# Patient Record
Sex: FEMALE | Race: WHITE | NOT HISPANIC OR LATINO | URBAN - METROPOLITAN AREA
[De-identification: names, ages, dates, MRNs, and addresses within clinical notes are randomized per-mention and may not be internally consistent; named-entity substitution may affect disease eponyms.]

---

## 2017-02-07 ENCOUNTER — OFFICE (AMBULATORY)
Dept: URBAN - METROPOLITAN AREA CLINIC 75 | Facility: CLINIC | Age: 77
End: 2017-02-07

## 2017-02-07 VITALS
HEART RATE: 84 BPM | WEIGHT: 204 LBS | DIASTOLIC BLOOD PRESSURE: 76 MMHG | HEIGHT: 66 IN | SYSTOLIC BLOOD PRESSURE: 148 MMHG | RESPIRATION RATE: 20 BRPM

## 2017-02-07 DIAGNOSIS — R10.13 EPIGASTRIC PAIN: ICD-10-CM

## 2017-02-07 DIAGNOSIS — R07.89 OTHER CHEST PAIN: ICD-10-CM

## 2017-02-07 DIAGNOSIS — K21.9 GASTRO-ESOPHAGEAL REFLUX DISEASE WITHOUT ESOPHAGITIS: ICD-10-CM

## 2017-02-07 DIAGNOSIS — K58.9 IRRITABLE BOWEL SYNDROME WITHOUT DIARRHEA: ICD-10-CM

## 2017-02-07 DIAGNOSIS — R19.4 CHANGE IN BOWEL HABIT: ICD-10-CM

## 2017-02-07 DIAGNOSIS — K31.84 GASTROPARESIS: ICD-10-CM

## 2017-02-07 DIAGNOSIS — K29.70 GASTRITIS, UNSPECIFIED, WITHOUT BLEEDING: ICD-10-CM

## 2017-02-07 DIAGNOSIS — K20.9 ESOPHAGITIS, UNSPECIFIED: ICD-10-CM

## 2017-02-07 PROCEDURE — 99213 OFFICE O/P EST LOW 20 MIN: CPT | Performed by: INTERNAL MEDICINE

## 2017-11-21 ENCOUNTER — OFFICE (AMBULATORY)
Dept: URBAN - METROPOLITAN AREA CLINIC 75 | Facility: CLINIC | Age: 77
End: 2017-11-21

## 2017-11-21 VITALS
WEIGHT: 197 LBS | SYSTOLIC BLOOD PRESSURE: 150 MMHG | HEIGHT: 66 IN | HEART RATE: 76 BPM | DIASTOLIC BLOOD PRESSURE: 82 MMHG

## 2017-11-21 DIAGNOSIS — K75.81 NONALCOHOLIC STEATOHEPATITIS (NASH): ICD-10-CM

## 2017-11-21 DIAGNOSIS — R94.5 ABNORMAL RESULTS OF LIVER FUNCTION STUDIES: ICD-10-CM

## 2017-11-21 DIAGNOSIS — K21.9 GASTRO-ESOPHAGEAL REFLUX DISEASE WITHOUT ESOPHAGITIS: ICD-10-CM

## 2017-11-21 DIAGNOSIS — K31.84 GASTROPARESIS: ICD-10-CM

## 2017-11-21 DIAGNOSIS — K58.9 IRRITABLE BOWEL SYNDROME WITHOUT DIARRHEA: ICD-10-CM

## 2017-11-21 DIAGNOSIS — K29.70 GASTRITIS, UNSPECIFIED, WITHOUT BLEEDING: ICD-10-CM

## 2017-11-21 PROCEDURE — 99214 OFFICE O/P EST MOD 30 MIN: CPT

## 2018-05-17 ENCOUNTER — OFFICE (AMBULATORY)
Dept: URBAN - METROPOLITAN AREA CLINIC 75 | Facility: CLINIC | Age: 78
End: 2018-05-17
Payer: COMMERCIAL

## 2018-05-17 VITALS
WEIGHT: 195 LBS | HEART RATE: 68 BPM | DIASTOLIC BLOOD PRESSURE: 78 MMHG | SYSTOLIC BLOOD PRESSURE: 160 MMHG | TEMPERATURE: 98.2 F | HEIGHT: 66 IN

## 2018-05-17 DIAGNOSIS — K58.9 IRRITABLE BOWEL SYNDROME WITHOUT DIARRHEA: ICD-10-CM

## 2018-05-17 DIAGNOSIS — K74.69 OTHER CIRRHOSIS OF LIVER: ICD-10-CM

## 2018-05-17 DIAGNOSIS — K75.81 NONALCOHOLIC STEATOHEPATITIS (NASH): ICD-10-CM

## 2018-05-17 DIAGNOSIS — K21.9 GASTRO-ESOPHAGEAL REFLUX DISEASE WITHOUT ESOPHAGITIS: ICD-10-CM

## 2018-05-17 DIAGNOSIS — K31.84 GASTROPARESIS: ICD-10-CM

## 2018-05-17 PROCEDURE — 99213 OFFICE O/P EST LOW 20 MIN: CPT

## 2018-05-17 RX ORDER — URSODIOL 500 MG/1
1000 TABLET ORAL
Qty: 180 | Refills: 3 | Status: ACTIVE
Start: 2018-05-17

## 2019-02-26 ENCOUNTER — OFFICE (AMBULATORY)
Dept: URBAN - METROPOLITAN AREA CLINIC 75 | Facility: CLINIC | Age: 79
End: 2019-02-26

## 2019-02-26 VITALS
DIASTOLIC BLOOD PRESSURE: 70 MMHG | HEART RATE: 79 BPM | SYSTOLIC BLOOD PRESSURE: 120 MMHG | WEIGHT: 191 LBS | HEIGHT: 66 IN

## 2019-02-26 DIAGNOSIS — K75.81 NONALCOHOLIC STEATOHEPATITIS (NASH): ICD-10-CM

## 2019-02-26 DIAGNOSIS — K21.9 GASTRO-ESOPHAGEAL REFLUX DISEASE WITHOUT ESOPHAGITIS: ICD-10-CM

## 2019-02-26 DIAGNOSIS — K58.9 IRRITABLE BOWEL SYNDROME WITHOUT DIARRHEA: ICD-10-CM

## 2019-02-26 DIAGNOSIS — K74.60 UNSPECIFIED CIRRHOSIS OF LIVER: ICD-10-CM

## 2019-02-26 PROCEDURE — 99213 OFFICE O/P EST LOW 20 MIN: CPT | Performed by: INTERNAL MEDICINE

## 2019-09-27 ENCOUNTER — OFFICE (AMBULATORY)
Dept: URBAN - METROPOLITAN AREA CLINIC 75 | Facility: CLINIC | Age: 79
End: 2019-09-27

## 2019-09-27 VITALS
OXYGEN SATURATION: 97 % | SYSTOLIC BLOOD PRESSURE: 170 MMHG | HEART RATE: 73 BPM | WEIGHT: 190 LBS | DIASTOLIC BLOOD PRESSURE: 90 MMHG | HEIGHT: 66 IN

## 2019-09-27 DIAGNOSIS — R19.7 DIARRHEA, UNSPECIFIED: ICD-10-CM

## 2019-09-27 DIAGNOSIS — R10.30 LOWER ABDOMINAL PAIN, UNSPECIFIED: ICD-10-CM

## 2019-09-27 DIAGNOSIS — K58.9 IRRITABLE BOWEL SYNDROME WITHOUT DIARRHEA: ICD-10-CM

## 2019-09-27 DIAGNOSIS — K21.9 GASTRO-ESOPHAGEAL REFLUX DISEASE WITHOUT ESOPHAGITIS: ICD-10-CM

## 2019-09-27 PROCEDURE — 99214 OFFICE O/P EST MOD 30 MIN: CPT

## 2019-09-27 RX ORDER — DICYCLOMINE HYDROCHLORIDE 10 MG/1
40 CAPSULE ORAL
Qty: 60 | Refills: 1 | Status: COMPLETED
Start: 2019-09-27 | End: 2019-11-07

## 2019-11-07 ENCOUNTER — OFFICE (AMBULATORY)
Dept: URBAN - METROPOLITAN AREA CLINIC 75 | Facility: CLINIC | Age: 79
End: 2019-11-07

## 2019-11-07 VITALS
HEIGHT: 66 IN | SYSTOLIC BLOOD PRESSURE: 128 MMHG | WEIGHT: 185 LBS | HEART RATE: 74 BPM | OXYGEN SATURATION: 97 % | DIASTOLIC BLOOD PRESSURE: 86 MMHG

## 2019-11-07 DIAGNOSIS — K58.9 IRRITABLE BOWEL SYNDROME WITHOUT DIARRHEA: ICD-10-CM

## 2019-11-07 DIAGNOSIS — A04.72 ENTEROCOLITIS DUE TO CLOSTRIDIUM DIFFICILE, NOT SPECIFIED AS: ICD-10-CM

## 2019-11-07 DIAGNOSIS — R19.7 DIARRHEA, UNSPECIFIED: ICD-10-CM

## 2019-11-07 PROCEDURE — 99214 OFFICE O/P EST MOD 30 MIN: CPT

## 2019-11-07 RX ORDER — VANCOMYCIN HYDROCHLORIDE 50 MG/ML
600 KIT ORAL
Qty: 200 | Refills: 0 | Status: COMPLETED
Start: 2019-11-07 | End: 2020-01-03

## 2019-11-22 ENCOUNTER — OFFICE (AMBULATORY)
Dept: URBAN - METROPOLITAN AREA CLINIC 75 | Facility: CLINIC | Age: 79
End: 2019-11-22

## 2019-11-22 ENCOUNTER — OFFICE (AMBULATORY)
Dept: URBAN - METROPOLITAN AREA LAB 2 | Facility: LAB | Age: 79
End: 2019-11-22

## 2019-11-22 VITALS
WEIGHT: 186 LBS | HEIGHT: 66 IN | DIASTOLIC BLOOD PRESSURE: 92 MMHG | HEART RATE: 77 BPM | SYSTOLIC BLOOD PRESSURE: 138 MMHG | TEMPERATURE: 97.7 F | OXYGEN SATURATION: 95 %

## 2019-11-22 DIAGNOSIS — R53.83 OTHER FATIGUE: ICD-10-CM

## 2019-11-22 DIAGNOSIS — E11.9 TYPE 2 DIABETES MELLITUS WITHOUT COMPLICATIONS: ICD-10-CM

## 2019-11-22 DIAGNOSIS — I10 ESSENTIAL (PRIMARY) HYPERTENSION: ICD-10-CM

## 2019-11-22 DIAGNOSIS — R15.2 FECAL URGENCY: ICD-10-CM

## 2019-11-22 DIAGNOSIS — R19.7 DIARRHEA, UNSPECIFIED: ICD-10-CM

## 2019-11-22 DIAGNOSIS — A04.71 ENTEROCOLITIS DUE TO CLOSTRIDIUM DIFFICILE, RECURRENT: ICD-10-CM

## 2019-11-22 PROCEDURE — 99214 OFFICE O/P EST MOD 30 MIN: CPT

## 2019-11-22 PROCEDURE — 87324 CLOSTRIDIUM AG IA: CPT | Mod: 59 | Performed by: INTERNAL MEDICINE

## 2019-11-22 PROCEDURE — 87449 NOS EACH ORGANISM AG IA: CPT | Performed by: INTERNAL MEDICINE

## 2019-11-22 RX ORDER — FIDAXOMICIN 200 MG/1
400 TABLET, FILM COATED ORAL
Qty: 20 | Refills: 0 | Status: COMPLETED
Start: 2019-11-22 | End: 2020-01-03

## 2020-01-03 ENCOUNTER — OFFICE (AMBULATORY)
Dept: URBAN - METROPOLITAN AREA CLINIC 75 | Facility: CLINIC | Age: 80
End: 2020-01-03

## 2020-01-03 ENCOUNTER — OFFICE (AMBULATORY)
Dept: URBAN - METROPOLITAN AREA LAB 2 | Facility: LAB | Age: 80
End: 2020-01-03

## 2020-01-03 VITALS
SYSTOLIC BLOOD PRESSURE: 170 MMHG | HEART RATE: 85 BPM | DIASTOLIC BLOOD PRESSURE: 118 MMHG | HEIGHT: 66 IN | WEIGHT: 187 LBS | OXYGEN SATURATION: 94 %

## 2020-01-03 DIAGNOSIS — R19.7 DIARRHEA, UNSPECIFIED: ICD-10-CM

## 2020-01-03 DIAGNOSIS — A04.72 ENTEROCOLITIS DUE TO CLOSTRIDIUM DIFFICILE, NOT SPECIFIED AS: ICD-10-CM

## 2020-01-03 PROCEDURE — 87324 CLOSTRIDIUM AG IA: CPT | Mod: 59

## 2020-01-03 PROCEDURE — 87449 NOS EACH ORGANISM AG IA: CPT

## 2020-01-03 PROCEDURE — 99214 OFFICE O/P EST MOD 30 MIN: CPT

## 2020-01-16 VITALS
DIASTOLIC BLOOD PRESSURE: 90 MMHG | SYSTOLIC BLOOD PRESSURE: 154 MMHG | DIASTOLIC BLOOD PRESSURE: 72 MMHG | SYSTOLIC BLOOD PRESSURE: 199 MMHG | SYSTOLIC BLOOD PRESSURE: 155 MMHG | DIASTOLIC BLOOD PRESSURE: 73 MMHG | HEART RATE: 95 BPM | RESPIRATION RATE: 16 BRPM | HEART RATE: 74 BPM | SYSTOLIC BLOOD PRESSURE: 178 MMHG | DIASTOLIC BLOOD PRESSURE: 69 MMHG | RESPIRATION RATE: 18 BRPM | RESPIRATION RATE: 13 BRPM | HEART RATE: 83 BPM | DIASTOLIC BLOOD PRESSURE: 107 MMHG | WEIGHT: 187 LBS | SYSTOLIC BLOOD PRESSURE: 174 MMHG | HEIGHT: 66 IN | HEART RATE: 89 BPM | SYSTOLIC BLOOD PRESSURE: 146 MMHG | HEART RATE: 84 BPM | DIASTOLIC BLOOD PRESSURE: 58 MMHG | RESPIRATION RATE: 21 BRPM | DIASTOLIC BLOOD PRESSURE: 84 MMHG | OXYGEN SATURATION: 97 % | HEART RATE: 88 BPM | SYSTOLIC BLOOD PRESSURE: 138 MMHG | SYSTOLIC BLOOD PRESSURE: 183 MMHG | HEART RATE: 99 BPM | OXYGEN SATURATION: 95 % | RESPIRATION RATE: 22 BRPM | SYSTOLIC BLOOD PRESSURE: 143 MMHG | OXYGEN SATURATION: 98 % | TEMPERATURE: 98.8 F | RESPIRATION RATE: 23 BRPM | DIASTOLIC BLOOD PRESSURE: 76 MMHG | DIASTOLIC BLOOD PRESSURE: 74 MMHG | TEMPERATURE: 97.5 F

## 2020-01-17 ENCOUNTER — AMBULATORY SURGICAL CENTER (AMBULATORY)
Dept: URBAN - METROPOLITAN AREA SURGERY 17 | Facility: SURGERY | Age: 80
End: 2020-01-17
Payer: COMMERCIAL

## 2020-01-17 ENCOUNTER — OFFICE (AMBULATORY)
Dept: URBAN - METROPOLITAN AREA PATHOLOGY 4 | Facility: PATHOLOGY | Age: 80
End: 2020-01-17

## 2020-01-17 DIAGNOSIS — R19.4 CHANGE IN BOWEL HABIT: ICD-10-CM

## 2020-01-17 DIAGNOSIS — R19.7 DIARRHEA, UNSPECIFIED: ICD-10-CM

## 2020-01-17 DIAGNOSIS — Z86.19 PERSONAL HISTORY OF OTHER INFECTIOUS AND PARASITIC DISEASES: ICD-10-CM

## 2020-01-17 LAB
GI HISTOLOGY: A. UNSPECIFIED: (no result)
GI HISTOLOGY: B. SELECT: (no result)
GI HISTOLOGY: PDF REPORT: (no result)

## 2020-01-17 PROCEDURE — 88305 TISSUE EXAM BY PATHOLOGIST: CPT | Performed by: INTERNAL MEDICINE

## 2020-01-17 PROCEDURE — 45380 COLONOSCOPY AND BIOPSY: CPT | Performed by: INTERNAL MEDICINE

## 2020-02-28 ENCOUNTER — OFFICE (AMBULATORY)
Dept: URBAN - METROPOLITAN AREA CLINIC 75 | Facility: CLINIC | Age: 80
End: 2020-02-28

## 2020-02-28 VITALS
WEIGHT: 189 LBS | HEIGHT: 66 IN | HEART RATE: 69 BPM | OXYGEN SATURATION: 96 % | DIASTOLIC BLOOD PRESSURE: 86 MMHG | SYSTOLIC BLOOD PRESSURE: 128 MMHG

## 2020-02-28 DIAGNOSIS — K58.9 IRRITABLE BOWEL SYNDROME WITHOUT DIARRHEA: ICD-10-CM

## 2020-02-28 DIAGNOSIS — K21.9 GASTRO-ESOPHAGEAL REFLUX DISEASE WITHOUT ESOPHAGITIS: ICD-10-CM

## 2020-02-28 DIAGNOSIS — K31.84 GASTROPARESIS: ICD-10-CM

## 2020-02-28 DIAGNOSIS — K75.81 NONALCOHOLIC STEATOHEPATITIS (NASH): ICD-10-CM

## 2020-02-28 PROCEDURE — 99214 OFFICE O/P EST MOD 30 MIN: CPT

## 2020-02-28 RX ORDER — DIPHENOXYLATE HYDROCHLORIDE AND ATROPINE SULFATE 2.5; .025 MG/1; MG/1
10 TABLET ORAL
Qty: 120 | Refills: 5 | Status: ACTIVE

## 2021-02-19 ENCOUNTER — OFFICE (AMBULATORY)
Dept: URBAN - METROPOLITAN AREA CLINIC 75 | Facility: CLINIC | Age: 81
End: 2021-02-19

## 2021-02-19 VITALS — OXYGEN SATURATION: 97 % | HEIGHT: 66 IN | WEIGHT: 188 LBS | TEMPERATURE: 97.3 F | HEART RATE: 76 BPM

## 2021-02-19 DIAGNOSIS — K31.84 GASTROPARESIS: ICD-10-CM

## 2021-02-19 DIAGNOSIS — Z91.89 OTHER SPECIFIED PERSONAL RISK FACTORS, NOT ELSEWHERE CLASSIF: ICD-10-CM

## 2021-02-19 DIAGNOSIS — K75.81 NONALCOHOLIC STEATOHEPATITIS (NASH): ICD-10-CM

## 2021-02-19 DIAGNOSIS — K21.9 GASTRO-ESOPHAGEAL REFLUX DISEASE WITHOUT ESOPHAGITIS: ICD-10-CM

## 2021-02-19 DIAGNOSIS — K58.9 IRRITABLE BOWEL SYNDROME WITHOUT DIARRHEA: ICD-10-CM

## 2021-02-19 PROCEDURE — 99214 OFFICE O/P EST MOD 30 MIN: CPT

## 2021-02-19 RX ORDER — AZITHROMYCIN 250 MG/1
TABLET, FILM COATED ORAL
Qty: 1 | Refills: 1 | Status: COMPLETED
Start: 2021-02-19 | End: 2021-04-09

## 2021-04-09 ENCOUNTER — OFFICE (AMBULATORY)
Dept: URBAN - METROPOLITAN AREA CLINIC 75 | Facility: CLINIC | Age: 81
End: 2021-04-09

## 2021-04-09 VITALS
TEMPERATURE: 96.8 F | WEIGHT: 184 LBS | DIASTOLIC BLOOD PRESSURE: 54 MMHG | SYSTOLIC BLOOD PRESSURE: 116 MMHG | HEIGHT: 66 IN

## 2021-04-09 DIAGNOSIS — Z91.89 OTHER SPECIFIED PERSONAL RISK FACTORS, NOT ELSEWHERE CLASSIF: ICD-10-CM

## 2021-04-09 DIAGNOSIS — R10.13 EPIGASTRIC PAIN: ICD-10-CM

## 2021-04-09 DIAGNOSIS — K21.9 GASTRO-ESOPHAGEAL REFLUX DISEASE WITHOUT ESOPHAGITIS: ICD-10-CM

## 2021-04-09 DIAGNOSIS — K31.84 GASTROPARESIS: ICD-10-CM

## 2021-04-09 DIAGNOSIS — R11.0 NAUSEA: ICD-10-CM

## 2021-04-09 PROCEDURE — 99214 OFFICE O/P EST MOD 30 MIN: CPT

## 2021-06-16 VITALS
SYSTOLIC BLOOD PRESSURE: 140 MMHG | TEMPERATURE: 97.9 F | HEART RATE: 75 BPM | RESPIRATION RATE: 18 BRPM | WEIGHT: 184 LBS | RESPIRATION RATE: 22 BRPM | SYSTOLIC BLOOD PRESSURE: 175 MMHG | SYSTOLIC BLOOD PRESSURE: 156 MMHG | HEART RATE: 76 BPM | DIASTOLIC BLOOD PRESSURE: 78 MMHG | HEIGHT: 66 IN | OXYGEN SATURATION: 95 % | DIASTOLIC BLOOD PRESSURE: 71 MMHG | OXYGEN SATURATION: 99 % | OXYGEN SATURATION: 92 % | HEART RATE: 84 BPM | HEART RATE: 80 BPM | DIASTOLIC BLOOD PRESSURE: 81 MMHG | RESPIRATION RATE: 17 BRPM | OXYGEN SATURATION: 84 % | SYSTOLIC BLOOD PRESSURE: 146 MMHG | TEMPERATURE: 97.8 F | DIASTOLIC BLOOD PRESSURE: 74 MMHG | OXYGEN SATURATION: 96 % | RESPIRATION RATE: 20 BRPM | SYSTOLIC BLOOD PRESSURE: 134 MMHG

## 2021-06-21 ENCOUNTER — AMBULATORY SURGICAL CENTER (AMBULATORY)
Dept: URBAN - METROPOLITAN AREA SURGERY 17 | Facility: SURGERY | Age: 81
End: 2021-06-21
Payer: COMMERCIAL

## 2021-06-21 ENCOUNTER — OFFICE (AMBULATORY)
Dept: URBAN - METROPOLITAN AREA PATHOLOGY 4 | Facility: PATHOLOGY | Age: 81
End: 2021-06-21
Payer: COMMERCIAL

## 2021-06-21 DIAGNOSIS — K29.50 UNSPECIFIED CHRONIC GASTRITIS WITHOUT BLEEDING: ICD-10-CM

## 2021-06-21 DIAGNOSIS — R10.13 EPIGASTRIC PAIN: ICD-10-CM

## 2021-06-21 DIAGNOSIS — B96.81 HELICOBACTER PYLORI [H. PYLORI] AS THE CAUSE OF DISEASES CLA: ICD-10-CM

## 2021-06-21 DIAGNOSIS — K31.89 OTHER DISEASES OF STOMACH AND DUODENUM: ICD-10-CM

## 2021-06-21 DIAGNOSIS — R11.0 NAUSEA: ICD-10-CM

## 2021-06-21 DIAGNOSIS — K29.60 OTHER GASTRITIS WITHOUT BLEEDING: ICD-10-CM

## 2021-06-21 DIAGNOSIS — K21.9 GASTRO-ESOPHAGEAL REFLUX DISEASE WITHOUT ESOPHAGITIS: ICD-10-CM

## 2021-06-21 LAB
GI HISTOLOGY: A. SELECT: (no result)
GI HISTOLOGY: B. UNSPECIFIED: (no result)
GI HISTOLOGY: C. SELECT: (no result)
GI HISTOLOGY: PDF REPORT: (no result)

## 2021-06-21 PROCEDURE — 88305 TISSUE EXAM BY PATHOLOGIST: CPT | Mod: Q6 | Performed by: INTERNAL MEDICINE

## 2021-06-21 PROCEDURE — 43239 EGD BIOPSY SINGLE/MULTIPLE: CPT | Performed by: INTERNAL MEDICINE

## 2021-06-21 PROCEDURE — 88342 IMHCHEM/IMCYTCHM 1ST ANTB: CPT | Mod: Q6 | Performed by: INTERNAL MEDICINE

## 2021-06-21 NOTE — SERVICEHPINOTES
04/09/21...Mrs. Holm follows up today to reassess c/p sour stomach and burning in stomach. She was given a trial of azithromycin which did not help so she restarted the domperidone. She was instructed to increased her Omeprazole 40mg to BID dosing. She reports that this seem to help. She still reports poor appetite in the evening and weight loss (4lbs in 6 weeks). She reports sour stomach if she lies down after eating . Not as bad as it was but still present. BRHad EKG done with PCP at [facility]--normal per pateint--will obtain   NANCY HOLM  is a  80  female   who presents today for a  EGD   for   the indications listed below. The updated Patient Profile was reviewed prior to the procedure, in conjunction with the Physical Exam, including medical conditions, surgical procedures, medications, allergies, family history and social history. See Physical Exam time stamp below for date and time of HPI completion.Pre-operatively, I reviewed the indication(s) for the procedure, the risks of the procedure [including but not limited to: unexpected bleeding possibly requiring hospitalization and/or unplanned repeat procedures, perforation possibly requiring surgical treatment, missed lesions and complications of sedation/MAC (also explained by anesthesia staff)]. I have evaluated the patient for risks associated with the planned anesthesia and the procedure to be performed and find the patient an acceptable candidate for IV sedation.Multiple opportunities were provided for any questions or concerns, and all questions were answered satisfactorily before any anesthesia was administered. We will proceed with the planned procedure.QUINCY

## 2021-06-25 ENCOUNTER — APPOINTMENT (OUTPATIENT)
Dept: CT IMAGING | Facility: HOSPITAL | Age: 81
End: 2021-06-25

## 2021-06-25 ENCOUNTER — APPOINTMENT (OUTPATIENT)
Dept: GENERAL RADIOLOGY | Facility: HOSPITAL | Age: 81
End: 2021-06-25

## 2021-06-25 ENCOUNTER — HOSPITAL ENCOUNTER (EMERGENCY)
Facility: HOSPITAL | Age: 81
Discharge: HOME | End: 2021-06-25
Admitting: EMERGENCY MEDICINE

## 2021-06-25 VITALS
WEIGHT: 186.95 LBS | SYSTOLIC BLOOD PRESSURE: 152 MMHG | HEIGHT: 66 IN | OXYGEN SATURATION: 96 % | BODY MASS INDEX: 30.05 KG/M2 | HEART RATE: 61 BPM | RESPIRATION RATE: 16 BRPM | DIASTOLIC BLOOD PRESSURE: 86 MMHG | TEMPERATURE: 98.6 F

## 2021-06-25 DIAGNOSIS — S20.219A CONTUSION OF CHEST WALL WITH INTACT SKIN: ICD-10-CM

## 2021-06-25 DIAGNOSIS — M54.2 CERVICALGIA: ICD-10-CM
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
## 2021-06-25 DIAGNOSIS — M54.9 ACUTE MIDLINE BACK PAIN, UNSPECIFIED BACK LOCATION: ICD-10-CM

## 2021-06-25 DIAGNOSIS — M89.8X8 BONY PELVIC PAIN: ICD-10-CM

## 2021-06-25 DIAGNOSIS — M89.8X5 PAIN IN FEMUR: ICD-10-CM

## 2021-06-25 LAB — CREAT BLDA-MCNC: 0.9 MG/DL (ref 0.6–1.3)

## 2021-06-25 PROCEDURE — 74177 CT ABD & PELVIS W/CONTRAST: CPT

## 2021-06-25 PROCEDURE — 72125 CT NECK SPINE W/O DYE: CPT

## 2021-06-25 PROCEDURE — 73552 X-RAY EXAM OF FEMUR 2/>: CPT

## 2021-06-25 PROCEDURE — 0 IOPAMIDOL PER 1 ML: Performed by: NURSE PRACTITIONER

## 2021-06-25 PROCEDURE — 71260 CT THORAX DX C+: CPT

## 2021-06-25 PROCEDURE — 82565 ASSAY OF CREATININE: CPT

## 2021-06-25 PROCEDURE — 99283 EMERGENCY DEPT VISIT LOW MDM: CPT

## 2021-06-25 RX ADMIN — IOPAMIDOL 100 ML: 755 INJECTION, SOLUTION INTRAVENOUS at 18:20

## 2021-12-18 ENCOUNTER — APPOINTMENT (OUTPATIENT)
Dept: GENERAL RADIOLOGY | Facility: HOSPITAL | Age: 81
End: 2021-12-18

## 2021-12-18 ENCOUNTER — HOSPITAL ENCOUNTER (EMERGENCY)
Facility: HOSPITAL | Age: 81
Discharge: HOME | End: 2021-12-18
Admitting: EMERGENCY MEDICINE

## 2021-12-18 ENCOUNTER — APPOINTMENT (OUTPATIENT)
Dept: CT IMAGING | Facility: HOSPITAL | Age: 81
End: 2021-12-18

## 2021-12-18 VITALS
WEIGHT: 186 LBS | RESPIRATION RATE: 16 BRPM | SYSTOLIC BLOOD PRESSURE: 160 MMHG | BODY MASS INDEX: 25.19 KG/M2 | TEMPERATURE: 98.3 F | HEART RATE: 73 BPM | DIASTOLIC BLOOD PRESSURE: 71 MMHG | HEIGHT: 72 IN | OXYGEN SATURATION: 95 %
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
## 2021-12-18 DIAGNOSIS — S20.212A CONTUSION OF LEFT CHEST WALL, INITIAL ENCOUNTER: ICD-10-CM

## 2021-12-18 DIAGNOSIS — R07.81 RIB PAIN ON LEFT SIDE: Primary | ICD-10-CM

## 2021-12-18 DIAGNOSIS — S09.90XA INJURY OF HEAD, INITIAL ENCOUNTER: ICD-10-CM

## 2021-12-18 PROCEDURE — 71046 X-RAY EXAM CHEST 2 VIEWS: CPT

## 2021-12-18 PROCEDURE — 99283 EMERGENCY DEPT VISIT LOW MDM: CPT

## 2021-12-18 PROCEDURE — 72125 CT NECK SPINE W/O DYE: CPT

## 2021-12-18 PROCEDURE — 73502 X-RAY EXAM HIP UNI 2-3 VIEWS: CPT

## 2021-12-18 PROCEDURE — 70450 CT HEAD/BRAIN W/O DYE: CPT

## 2021-12-18 RX ORDER — ACETAMINOPHEN 325 MG/1
650 TABLET ORAL EVERY 6 HOURS PRN
Status: DISCONTINUED | OUTPATIENT
Start: 2021-12-18 | End: 2021-12-18 | Stop reason: HOSPADM

## 2021-12-18 RX ADMIN — ACETAMINOPHEN 650 MG: 325 TABLET, FILM COATED ORAL at 13:03

## 2022-05-04 ENCOUNTER — APPOINTMENT (OUTPATIENT)
Dept: CT IMAGING | Facility: HOSPITAL | Age: 82
End: 2022-05-04

## 2022-05-04 ENCOUNTER — HOSPITAL ENCOUNTER (INPATIENT)
Facility: HOSPITAL | Age: 82
LOS: 7 days | Discharge: NURSING, REHAB OR LONG-TERM CARE | End: 2022-05-11
Attending: EMERGENCY MEDICINE | Admitting: INTERNAL MEDICINE

## 2022-05-04 ENCOUNTER — APPOINTMENT (OUTPATIENT)
Dept: GENERAL RADIOLOGY | Facility: HOSPITAL | Age: 82
End: 2022-05-04

## 2022-05-04 DIAGNOSIS — E87.6 HYPOKALEMIA: Primary | ICD-10-CM

## 2022-05-04 DIAGNOSIS — N39.0 ACUTE UTI: ICD-10-CM

## 2022-05-04 DIAGNOSIS — R53.1 WEAKNESS: ICD-10-CM

## 2022-05-04 PROBLEM — G93.41 ACUTE METABOLIC ENCEPHALOPATHY: Status: ACTIVE | Noted: 2022-05-04

## 2022-05-04 PROBLEM — E86.0 DEHYDRATION: Status: ACTIVE | Noted: 2022-05-04

## 2022-05-04 LAB
ALBUMIN SERPL-MCNC: 3.3 G/DL (ref 3.5–5.2)
ALBUMIN/GLOB SERPL: 1.3 G/DL
ALP SERPL-CCNC: 141 U/L (ref 39–117)
ALT SERPL W P-5'-P-CCNC: 10 U/L (ref 1–33)
ANION GAP SERPL CALCULATED.3IONS-SCNC: 11 MMOL/L (ref 5–15)
ANION GAP SERPL CALCULATED.3IONS-SCNC: 12 MMOL/L (ref 5–15)
AST SERPL-CCNC: 35 U/L (ref 1–32)
B PARAPERT DNA SPEC QL NAA+PROBE: NOT DETECTED
B PERT DNA SPEC QL NAA+PROBE: NOT DETECTED
BACTERIA UR QL AUTO: ABNORMAL /HPF
BASOPHILS # BLD AUTO: 0 10*3/MM3 (ref 0–0.2)
BASOPHILS # BLD AUTO: 0 10*3/MM3 (ref 0–0.2)
BASOPHILS NFR BLD AUTO: 0.3 % (ref 0–1.5)
BASOPHILS NFR BLD AUTO: 0.3 % (ref 0–1.5)
BILIRUB SERPL-MCNC: 0.4 MG/DL (ref 0–1.2)
BILIRUB UR QL STRIP: NEGATIVE
BUN SERPL-MCNC: 11 MG/DL (ref 8–23)
BUN SERPL-MCNC: 13 MG/DL (ref 8–23)
BUN/CREAT SERPL: 16.7 (ref 7–25)
BUN/CREAT SERPL: 17.6 (ref 7–25)
C PNEUM DNA NPH QL NAA+NON-PROBE: NOT DETECTED
CALCIUM SPEC-SCNC: 7.6 MG/DL (ref 8.6–10.5)
CALCIUM SPEC-SCNC: 8 MG/DL (ref 8.6–10.5)
CHLORIDE SERPL-SCNC: 91 MMOL/L (ref 98–107)
CHLORIDE SERPL-SCNC: 97 MMOL/L (ref 98–107)
CLARITY UR: ABNORMAL
CO2 SERPL-SCNC: 28 MMOL/L (ref 22–29)
CO2 SERPL-SCNC: 29 MMOL/L (ref 22–29)
COLOR UR: YELLOW
CREAT SERPL-MCNC: 0.66 MG/DL (ref 0.57–1)
CREAT SERPL-MCNC: 0.74 MG/DL (ref 0.57–1)
DEPRECATED RDW RBC AUTO: 42.9 FL (ref 37–54)
DEPRECATED RDW RBC AUTO: 44.2 FL (ref 37–54)
EGFRCR SERPLBLD CKD-EPI 2021: 80.9 ML/MIN/1.73
EGFRCR SERPLBLD CKD-EPI 2021: 87.7 ML/MIN/1.73
EOSINOPHIL # BLD AUTO: 0.1 10*3/MM3 (ref 0–0.4)
EOSINOPHIL # BLD AUTO: 0.2 10*3/MM3 (ref 0–0.4)
EOSINOPHIL NFR BLD AUTO: 1.2 % (ref 0.3–6.2)
EOSINOPHIL NFR BLD AUTO: 2.1 % (ref 0.3–6.2)
ERYTHROCYTE [DISTWIDTH] IN BLOOD BY AUTOMATED COUNT: 14.5 % (ref 12.3–15.4)
ERYTHROCYTE [DISTWIDTH] IN BLOOD BY AUTOMATED COUNT: 14.9 % (ref 12.3–15.4)
FLUAV SUBTYP SPEC NAA+PROBE: NOT DETECTED
FLUBV RNA ISLT QL NAA+PROBE: NOT DETECTED
GLOBULIN UR ELPH-MCNC: 2.6 GM/DL
GLUCOSE SERPL-MCNC: 136 MG/DL (ref 65–99)
GLUCOSE SERPL-MCNC: 181 MG/DL (ref 65–99)
GLUCOSE UR STRIP-MCNC: NEGATIVE MG/DL
HADV DNA SPEC NAA+PROBE: NOT DETECTED
HCOV 229E RNA SPEC QL NAA+PROBE: NOT DETECTED
HCOV HKU1 RNA SPEC QL NAA+PROBE: NOT DETECTED
HCOV NL63 RNA SPEC QL NAA+PROBE: NOT DETECTED
HCOV OC43 RNA SPEC QL NAA+PROBE: NOT DETECTED
HCT VFR BLD AUTO: 33.3 % (ref 34–46.6)
HCT VFR BLD AUTO: 35.2 % (ref 34–46.6)
HGB BLD-MCNC: 11 G/DL (ref 12–15.9)
HGB BLD-MCNC: 11.6 G/DL (ref 12–15.9)
HGB UR QL STRIP.AUTO: NEGATIVE
HMPV RNA NPH QL NAA+NON-PROBE: NOT DETECTED
HPIV1 RNA ISLT QL NAA+PROBE: NOT DETECTED
HPIV2 RNA SPEC QL NAA+PROBE: NOT DETECTED
HPIV3 RNA NPH QL NAA+PROBE: NOT DETECTED
HPIV4 P GENE NPH QL NAA+PROBE: NOT DETECTED
HYALINE CASTS UR QL AUTO: ABNORMAL /LPF
KETONES UR QL STRIP: NEGATIVE
LEUKOCYTE ESTERASE UR QL STRIP.AUTO: ABNORMAL
LYMPHOCYTES # BLD AUTO: 1.6 10*3/MM3 (ref 0.7–3.1)
LYMPHOCYTES # BLD AUTO: 2.3 10*3/MM3 (ref 0.7–3.1)
LYMPHOCYTES NFR BLD AUTO: 15.6 % (ref 19.6–45.3)
LYMPHOCYTES NFR BLD AUTO: 22.2 % (ref 19.6–45.3)
M PNEUMO IGG SER IA-ACNC: NOT DETECTED
MAGNESIUM SERPL-MCNC: 1.4 MG/DL (ref 1.6–2.4)
MCH RBC QN AUTO: 27.9 PG (ref 26.6–33)
MCH RBC QN AUTO: 28 PG (ref 26.6–33)
MCHC RBC AUTO-ENTMCNC: 32.9 G/DL (ref 31.5–35.7)
MCHC RBC AUTO-ENTMCNC: 33.2 G/DL (ref 31.5–35.7)
MCV RBC AUTO: 84.5 FL (ref 79–97)
MCV RBC AUTO: 84.6 FL (ref 79–97)
MONOCYTES # BLD AUTO: 0.8 10*3/MM3 (ref 0.1–0.9)
MONOCYTES # BLD AUTO: 0.9 10*3/MM3 (ref 0.1–0.9)
MONOCYTES NFR BLD AUTO: 7.9 % (ref 5–12)
MONOCYTES NFR BLD AUTO: 8.5 % (ref 5–12)
NEUTROPHILS NFR BLD AUTO: 6.9 10*3/MM3 (ref 1.7–7)
NEUTROPHILS NFR BLD AUTO: 67.8 % (ref 42.7–76)
NEUTROPHILS NFR BLD AUTO: 7.4 10*3/MM3 (ref 1.7–7)
NEUTROPHILS NFR BLD AUTO: 74.1 % (ref 42.7–76)
NITRITE UR QL STRIP: POSITIVE
NRBC BLD AUTO-RTO: 0 /100 WBC (ref 0–0.2)
NRBC BLD AUTO-RTO: 0.1 /100 WBC (ref 0–0.2)
PH UR STRIP.AUTO: 6.5 [PH] (ref 5–8)
PLATELET # BLD AUTO: 213 10*3/MM3 (ref 140–450)
PLATELET # BLD AUTO: 217 10*3/MM3 (ref 140–450)
PMV BLD AUTO: 7.5 FL (ref 6–12)
PMV BLD AUTO: 8 FL (ref 6–12)
POTASSIUM SERPL-SCNC: 2.6 MMOL/L (ref 3.5–5.2)
POTASSIUM SERPL-SCNC: 2.8 MMOL/L (ref 3.5–5.2)
PROT SERPL-MCNC: 5.9 G/DL (ref 6–8.5)
PROT UR QL STRIP: NEGATIVE
RBC # BLD AUTO: 3.93 10*6/MM3 (ref 3.77–5.28)
RBC # BLD AUTO: 4.16 10*6/MM3 (ref 3.77–5.28)
RBC # UR STRIP: ABNORMAL /HPF
REF LAB TEST METHOD: ABNORMAL
RHINOVIRUS RNA SPEC NAA+PROBE: NOT DETECTED
RSV RNA NPH QL NAA+NON-PROBE: NOT DETECTED
SARS-COV-2 RNA NPH QL NAA+NON-PROBE: NOT DETECTED
SODIUM SERPL-SCNC: 132 MMOL/L (ref 136–145)
SODIUM SERPL-SCNC: 136 MMOL/L (ref 136–145)
SP GR UR STRIP: 1.01 (ref 1–1.03)
SQUAMOUS #/AREA URNS HPF: ABNORMAL /HPF
UROBILINOGEN UR QL STRIP: ABNORMAL
WBC # UR STRIP: ABNORMAL /HPF
WBC NRBC COR # BLD: 10 10*3/MM3 (ref 3.4–10.8)
WBC NRBC COR # BLD: 10.2 10*3/MM3 (ref 3.4–10.8)

## 2022-05-04 PROCEDURE — 87102 FUNGUS ISOLATION CULTURE: CPT | Performed by: HOSPITALIST

## 2022-05-04 PROCEDURE — 0 IOPAMIDOL PER 1 ML: Performed by: EMERGENCY MEDICINE

## 2022-05-04 PROCEDURE — 99223 1ST HOSP IP/OBS HIGH 75: CPT | Performed by: HOSPITALIST

## 2022-05-04 PROCEDURE — 83735 ASSAY OF MAGNESIUM: CPT

## 2022-05-04 PROCEDURE — 85025 COMPLETE CBC W/AUTO DIFF WBC: CPT

## 2022-05-04 PROCEDURE — 99285 EMERGENCY DEPT VISIT HI MDM: CPT

## 2022-05-04 PROCEDURE — 25010000002 CEFTRIAXONE PER 250 MG

## 2022-05-04 PROCEDURE — 93005 ELECTROCARDIOGRAM TRACING: CPT

## 2022-05-04 PROCEDURE — 85025 COMPLETE CBC W/AUTO DIFF WBC: CPT | Performed by: HOSPITALIST

## 2022-05-04 PROCEDURE — 81001 URINALYSIS AUTO W/SCOPE: CPT

## 2022-05-04 PROCEDURE — 71045 X-RAY EXAM CHEST 1 VIEW: CPT

## 2022-05-04 PROCEDURE — 25010000002 ONDANSETRON PER 1 MG

## 2022-05-04 PROCEDURE — 80053 COMPREHEN METABOLIC PANEL: CPT

## 2022-05-04 PROCEDURE — 25010000002 SODIUM CHLORIDE 0.9 % WITH KCL 20 MEQ 20-0.9 MEQ/L-% SOLUTION: Performed by: HOSPITALIST

## 2022-05-04 PROCEDURE — 0202U NFCT DS 22 TRGT SARS-COV-2: CPT

## 2022-05-04 PROCEDURE — 74177 CT ABD & PELVIS W/CONTRAST: CPT

## 2022-05-04 PROCEDURE — P9612 CATHETERIZE FOR URINE SPEC: HCPCS

## 2022-05-04 PROCEDURE — 25010000002 ENOXAPARIN PER 10 MG: Performed by: HOSPITALIST

## 2022-05-04 RX ORDER — METAXALONE 800 MG/1
400 TABLET ORAL 2 TIMES DAILY PRN
COMMUNITY
End: 2022-11-13

## 2022-05-04 RX ORDER — BUPROPION HYDROCHLORIDE 150 MG/1
150 TABLET ORAL DAILY
COMMUNITY

## 2022-05-04 RX ORDER — OXYCODONE AND ACETAMINOPHEN 7.5; 325 MG/1; MG/1
1 TABLET ORAL EVERY 6 HOURS PRN
COMMUNITY
End: 2022-11-13

## 2022-05-04 RX ORDER — SODIUM CHLORIDE AND POTASSIUM CHLORIDE 150; 900 MG/100ML; MG/100ML
100 INJECTION, SOLUTION INTRAVENOUS CONTINUOUS
Status: DISCONTINUED | OUTPATIENT
Start: 2022-05-04 | End: 2022-05-05

## 2022-05-04 RX ORDER — VENLAFAXINE HYDROCHLORIDE 150 MG/1
150 CAPSULE, EXTENDED RELEASE ORAL DAILY
COMMUNITY

## 2022-05-04 RX ORDER — HYDROCHLOROTHIAZIDE 25 MG/1
25 TABLET ORAL DAILY
COMMUNITY

## 2022-05-04 RX ORDER — LEVOTHYROXINE SODIUM 0.03 MG/1
25 TABLET ORAL
COMMUNITY

## 2022-05-04 RX ORDER — ALBUTEROL SULFATE 90 UG/1
2 AEROSOL, METERED RESPIRATORY (INHALATION) EVERY 4 HOURS PRN
COMMUNITY

## 2022-05-04 RX ORDER — ENOXAPARIN SODIUM 100 MG/ML
40 INJECTION SUBCUTANEOUS
Status: DISCONTINUED | OUTPATIENT
Start: 2022-05-04 | End: 2022-05-11 | Stop reason: HOSPADM

## 2022-05-04 RX ORDER — POTASSIUM CHLORIDE 1.5 G/1.77G
40 POWDER, FOR SOLUTION ORAL AS NEEDED
Status: DISCONTINUED | OUTPATIENT
Start: 2022-05-04 | End: 2022-05-11 | Stop reason: HOSPADM

## 2022-05-04 RX ORDER — SODIUM CHLORIDE 9 MG/ML
100 INJECTION, SOLUTION INTRAVENOUS CONTINUOUS
Status: DISCONTINUED | OUTPATIENT
Start: 2022-05-04 | End: 2022-05-05

## 2022-05-04 RX ORDER — OMEPRAZOLE 40 MG/1
40 CAPSULE, DELAYED RELEASE ORAL 2 TIMES DAILY
COMMUNITY

## 2022-05-04 RX ORDER — BUDESONIDE AND FORMOTEROL FUMARATE DIHYDRATE 80; 4.5 UG/1; UG/1
2 AEROSOL RESPIRATORY (INHALATION)
COMMUNITY
End: 2022-11-13

## 2022-05-04 RX ORDER — BENAZEPRIL HYDROCHLORIDE 40 MG/1
40 TABLET, FILM COATED ORAL DAILY
COMMUNITY

## 2022-05-04 RX ORDER — URSODIOL 500 MG/1
500 TABLET, FILM COATED ORAL 2 TIMES DAILY
COMMUNITY

## 2022-05-04 RX ORDER — CLONAZEPAM 0.5 MG/1
0.5 TABLET ORAL NIGHTLY
COMMUNITY

## 2022-05-04 RX ORDER — POTASSIUM CHLORIDE 20 MEQ/1
40 TABLET, EXTENDED RELEASE ORAL ONCE
Status: COMPLETED | OUTPATIENT
Start: 2022-05-04 | End: 2022-05-04

## 2022-05-04 RX ORDER — ESTRADIOL 0.5 MG/1
0.5 TABLET ORAL DAILY
COMMUNITY

## 2022-05-04 RX ORDER — ICOSAPENT ETHYL 1000 MG/1
1 CAPSULE ORAL 2 TIMES DAILY WITH MEALS
COMMUNITY

## 2022-05-04 RX ORDER — POTASSIUM CHLORIDE 20 MEQ/1
40 TABLET, EXTENDED RELEASE ORAL AS NEEDED
Status: DISCONTINUED | OUTPATIENT
Start: 2022-05-04 | End: 2022-05-11 | Stop reason: HOSPADM

## 2022-05-04 RX ORDER — ENOXAPARIN SODIUM 100 MG/ML
40 INJECTION SUBCUTANEOUS EVERY 24 HOURS
Status: DISCONTINUED | OUTPATIENT
Start: 2022-05-04 | End: 2022-05-04 | Stop reason: SDUPTHER

## 2022-05-04 RX ORDER — SUCRALFATE 1 G/1
1 TABLET ORAL
COMMUNITY

## 2022-05-04 RX ORDER — MULTIPLE VITAMINS W/ MINERALS TAB 9MG-400MCG
1 TAB ORAL DAILY
COMMUNITY

## 2022-05-04 RX ORDER — MELOXICAM 7.5 MG/1
7.5 TABLET ORAL DAILY
COMMUNITY

## 2022-05-04 RX ORDER — ONDANSETRON 2 MG/ML
4 INJECTION INTRAMUSCULAR; INTRAVENOUS ONCE
Status: COMPLETED | OUTPATIENT
Start: 2022-05-04 | End: 2022-05-04

## 2022-05-04 RX ORDER — NYSTATIN 100000 [USP'U]/G
1 POWDER TOPICAL 2 TIMES DAILY
COMMUNITY

## 2022-05-04 RX ORDER — SODIUM CHLORIDE 0.9 % (FLUSH) 0.9 %
10 SYRINGE (ML) INJECTION EVERY 12 HOURS SCHEDULED
Status: DISCONTINUED | OUTPATIENT
Start: 2022-05-04 | End: 2022-05-11 | Stop reason: HOSPADM

## 2022-05-04 RX ORDER — GABAPENTIN 100 MG/1
100 CAPSULE ORAL 3 TIMES DAILY
COMMUNITY

## 2022-05-04 RX ORDER — ATORVASTATIN CALCIUM 20 MG/1
20 TABLET, FILM COATED ORAL NIGHTLY
COMMUNITY

## 2022-05-04 RX ORDER — SODIUM CHLORIDE 0.9 % (FLUSH) 0.9 %
10 SYRINGE (ML) INJECTION AS NEEDED
Status: DISCONTINUED | OUTPATIENT
Start: 2022-05-04 | End: 2022-05-11 | Stop reason: HOSPADM

## 2022-05-04 RX ORDER — CARVEDILOL 12.5 MG/1
12.5 TABLET ORAL 2 TIMES DAILY WITH MEALS
COMMUNITY

## 2022-05-04 RX ORDER — DIPHENOXYLATE HYDROCHLORIDE AND ATROPINE SULFATE 2.5; .025 MG/1; MG/1
1 TABLET ORAL EVERY 6 HOURS PRN
COMMUNITY

## 2022-05-04 RX ORDER — AMOXICILLIN 250 MG
2 CAPSULE ORAL 2 TIMES DAILY
COMMUNITY
End: 2022-11-13

## 2022-05-04 RX ORDER — POTASSIUM CHLORIDE 7.45 MG/ML
10 INJECTION INTRAVENOUS
Status: DISCONTINUED | OUTPATIENT
Start: 2022-05-04 | End: 2022-05-11 | Stop reason: HOSPADM

## 2022-05-04 RX ORDER — NITROGLYCERIN 0.4 MG/1
0.4 TABLET SUBLINGUAL
COMMUNITY

## 2022-05-04 RX ORDER — OXYCODONE HYDROCHLORIDE 5 MG/1
7.5 TABLET ORAL EVERY 4 HOURS PRN
Status: DISCONTINUED | OUTPATIENT
Start: 2022-05-04 | End: 2022-05-11 | Stop reason: HOSPADM

## 2022-05-04 RX ORDER — DICYCLOMINE HYDROCHLORIDE 10 MG/1
10 CAPSULE ORAL EVERY 6 HOURS
COMMUNITY

## 2022-05-04 RX ORDER — BENZONATATE 100 MG/1
100 CAPSULE ORAL 3 TIMES DAILY PRN
COMMUNITY
End: 2022-11-13

## 2022-05-04 RX ORDER — OCTISALATE, AVOBENZONE, HOMOSALATE, AND OCTOCRYLENE 29.4; 29.4; 49; 25.48 MG/ML; MG/ML; MG/ML; MG/ML
1 LOTION TOPICAL DAILY
COMMUNITY
End: 2022-11-13

## 2022-05-04 RX ORDER — AMLODIPINE BESYLATE 10 MG/1
10 TABLET ORAL DAILY
COMMUNITY

## 2022-05-04 RX ADMIN — SODIUM CHLORIDE AND POTASSIUM CHLORIDE 100 ML/HR: .9; .15 SOLUTION INTRAVENOUS at 23:13

## 2022-05-04 RX ADMIN — Medication 10 ML: at 23:13

## 2022-05-04 RX ADMIN — OXYCODONE 7.5 MG: 5 TABLET ORAL at 23:14

## 2022-05-04 RX ADMIN — Medication 10 ML: at 23:21

## 2022-05-04 RX ADMIN — SODIUM CHLORIDE 500 ML: 9 INJECTION, SOLUTION INTRAVENOUS at 13:46

## 2022-05-04 RX ADMIN — POTASSIUM CHLORIDE 40 MEQ: 1500 TABLET, EXTENDED RELEASE ORAL at 15:22

## 2022-05-04 RX ADMIN — CEFTRIAXONE 1 G: 10 INJECTION, POWDER, FOR SOLUTION INTRAVENOUS at 15:18

## 2022-05-04 RX ADMIN — IOPAMIDOL 100 ML: 755 INJECTION, SOLUTION INTRAVENOUS at 14:37

## 2022-05-04 RX ADMIN — ENOXAPARIN SODIUM 40 MG: 100 INJECTION SUBCUTANEOUS at 23:13

## 2022-05-04 RX ADMIN — ONDANSETRON 4 MG: 2 INJECTION INTRAMUSCULAR; INTRAVENOUS at 13:46

## 2022-05-05 LAB
MAGNESIUM SERPL-MCNC: 1.3 MG/DL (ref 1.6–2.4)
POTASSIUM SERPL-SCNC: 3.9 MMOL/L (ref 3.5–5.2)

## 2022-05-05 PROCEDURE — 94640 AIRWAY INHALATION TREATMENT: CPT

## 2022-05-05 PROCEDURE — 25010000002 ENOXAPARIN PER 10 MG: Performed by: HOSPITALIST

## 2022-05-05 PROCEDURE — 25010000002 MAGNESIUM SULFATE 2 GM/50ML SOLUTION: Performed by: FAMILY MEDICINE

## 2022-05-05 PROCEDURE — 99232 SBSQ HOSP IP/OBS MODERATE 35: CPT | Performed by: HOSPITALIST

## 2022-05-05 PROCEDURE — 25010000002 SODIUM CHLORIDE 0.9 % WITH KCL 20 MEQ 20-0.9 MEQ/L-% SOLUTION: Performed by: HOSPITALIST

## 2022-05-05 PROCEDURE — 83735 ASSAY OF MAGNESIUM: CPT | Performed by: HOSPITALIST

## 2022-05-05 PROCEDURE — 25010000002 CEFTRIAXONE PER 250 MG: Performed by: HOSPITALIST

## 2022-05-05 PROCEDURE — 97162 PT EVAL MOD COMPLEX 30 MIN: CPT

## 2022-05-05 PROCEDURE — 84132 ASSAY OF SERUM POTASSIUM: CPT | Performed by: HOSPITALIST

## 2022-05-05 PROCEDURE — 97165 OT EVAL LOW COMPLEX 30 MIN: CPT

## 2022-05-05 RX ORDER — CARVEDILOL 6.25 MG/1
12.5 TABLET ORAL 2 TIMES DAILY WITH MEALS
Status: DISCONTINUED | OUTPATIENT
Start: 2022-05-05 | End: 2022-05-11 | Stop reason: HOSPADM

## 2022-05-05 RX ORDER — URSODIOL 500 MG/1
500 TABLET, FILM COATED ORAL 2 TIMES DAILY
Status: DISCONTINUED | OUTPATIENT
Start: 2022-05-05 | End: 2022-05-11 | Stop reason: HOSPADM

## 2022-05-05 RX ORDER — L.ACID,PARA/B.BIFIDUM/S.THERM 8B CELL
1 CAPSULE ORAL DAILY
Status: DISCONTINUED | OUTPATIENT
Start: 2022-05-05 | End: 2022-05-11 | Stop reason: HOSPADM

## 2022-05-05 RX ORDER — ESTRADIOL 1 MG/1
0.5 TABLET ORAL DAILY
Status: DISCONTINUED | OUTPATIENT
Start: 2022-05-05 | End: 2022-05-11 | Stop reason: HOSPADM

## 2022-05-05 RX ORDER — BUPROPION HYDROCHLORIDE 150 MG/1
300 TABLET ORAL DAILY
Status: DISCONTINUED | OUTPATIENT
Start: 2022-05-05 | End: 2022-05-11 | Stop reason: HOSPADM

## 2022-05-05 RX ORDER — OCTISALATE, AVOBENZONE, HOMOSALATE, AND OCTOCRYLENE 29.4; 29.4; 49; 25.48 MG/ML; MG/ML; MG/ML; MG/ML
1 LOTION TOPICAL DAILY
Status: DISCONTINUED | OUTPATIENT
Start: 2022-05-05 | End: 2022-05-05

## 2022-05-05 RX ORDER — LISINOPRIL 20 MG/1
40 TABLET ORAL DAILY
Status: DISCONTINUED | OUTPATIENT
Start: 2022-05-05 | End: 2022-05-11 | Stop reason: HOSPADM

## 2022-05-05 RX ORDER — BUDESONIDE AND FORMOTEROL FUMARATE DIHYDRATE 80; 4.5 UG/1; UG/1
2 AEROSOL RESPIRATORY (INHALATION)
Status: DISCONTINUED | OUTPATIENT
Start: 2022-05-05 | End: 2022-05-11 | Stop reason: HOSPADM

## 2022-05-05 RX ORDER — VENLAFAXINE HYDROCHLORIDE 75 MG/1
75 CAPSULE, EXTENDED RELEASE ORAL DAILY
Status: DISCONTINUED | OUTPATIENT
Start: 2022-05-05 | End: 2022-05-11 | Stop reason: HOSPADM

## 2022-05-05 RX ORDER — LEVOTHYROXINE SODIUM 0.03 MG/1
25 TABLET ORAL
Status: DISCONTINUED | OUTPATIENT
Start: 2022-05-05 | End: 2022-05-11 | Stop reason: HOSPADM

## 2022-05-05 RX ORDER — CALCIUM CARBONATE 200(500)MG
2 TABLET,CHEWABLE ORAL 3 TIMES DAILY PRN
Status: DISCONTINUED | OUTPATIENT
Start: 2022-05-05 | End: 2022-05-11 | Stop reason: HOSPADM

## 2022-05-05 RX ORDER — PANTOPRAZOLE SODIUM 40 MG/1
40 TABLET, DELAYED RELEASE ORAL EVERY MORNING
Status: DISCONTINUED | OUTPATIENT
Start: 2022-05-05 | End: 2022-05-11 | Stop reason: HOSPADM

## 2022-05-05 RX ORDER — AMLODIPINE BESYLATE 5 MG/1
10 TABLET ORAL DAILY
Status: DISCONTINUED | OUTPATIENT
Start: 2022-05-05 | End: 2022-05-11 | Stop reason: HOSPADM

## 2022-05-05 RX ORDER — ATORVASTATIN CALCIUM 20 MG/1
20 TABLET, FILM COATED ORAL NIGHTLY
Status: DISCONTINUED | OUTPATIENT
Start: 2022-05-05 | End: 2022-05-11 | Stop reason: HOSPADM

## 2022-05-05 RX ORDER — HYDROCHLOROTHIAZIDE 25 MG/1
25 TABLET ORAL DAILY
Status: DISCONTINUED | OUTPATIENT
Start: 2022-05-05 | End: 2022-05-11 | Stop reason: HOSPADM

## 2022-05-05 RX ORDER — MAGNESIUM SULFATE HEPTAHYDRATE 40 MG/ML
4 INJECTION, SOLUTION INTRAVENOUS AS NEEDED
Status: DISCONTINUED | OUTPATIENT
Start: 2022-05-05 | End: 2022-05-11 | Stop reason: HOSPADM

## 2022-05-05 RX ORDER — MAGNESIUM SULFATE HEPTAHYDRATE 40 MG/ML
2 INJECTION, SOLUTION INTRAVENOUS AS NEEDED
Status: DISCONTINUED | OUTPATIENT
Start: 2022-05-05 | End: 2022-05-11 | Stop reason: HOSPADM

## 2022-05-05 RX ORDER — SUCRALFATE 1 G/1
1 TABLET ORAL 3 TIMES DAILY
Status: DISCONTINUED | OUTPATIENT
Start: 2022-05-05 | End: 2022-05-11 | Stop reason: HOSPADM

## 2022-05-05 RX ORDER — FERROUS SULFATE TAB EC 324 MG (65 MG FE EQUIVALENT) 324 (65 FE) MG
324 TABLET DELAYED RESPONSE ORAL
Status: DISCONTINUED | OUTPATIENT
Start: 2022-05-05 | End: 2022-05-11 | Stop reason: HOSPADM

## 2022-05-05 RX ORDER — MULTIPLE VITAMINS W/ MINERALS TAB 9MG-400MCG
1 TAB ORAL DAILY
Status: DISCONTINUED | OUTPATIENT
Start: 2022-05-05 | End: 2022-05-11 | Stop reason: HOSPADM

## 2022-05-05 RX ORDER — AMOXICILLIN 250 MG
2 CAPSULE ORAL 2 TIMES DAILY
Status: DISCONTINUED | OUTPATIENT
Start: 2022-05-05 | End: 2022-05-11 | Stop reason: HOSPADM

## 2022-05-05 RX ADMIN — URSODIOL 500 MG: 500 TABLET, FILM COATED ORAL at 08:32

## 2022-05-05 RX ADMIN — CEFTRIAXONE 1 G: 1 INJECTION, POWDER, FOR SOLUTION INTRAMUSCULAR; INTRAVENOUS at 15:17

## 2022-05-05 RX ADMIN — OXYCODONE 7.5 MG: 5 TABLET ORAL at 11:27

## 2022-05-05 RX ADMIN — Medication 10 ML: at 20:01

## 2022-05-05 RX ADMIN — URSODIOL 500 MG: 500 TABLET, FILM COATED ORAL at 20:00

## 2022-05-05 RX ADMIN — AMLODIPINE BESYLATE 10 MG: 5 TABLET ORAL at 08:33

## 2022-05-05 RX ADMIN — POTASSIUM CHLORIDE 40 MEQ: 1500 TABLET, EXTENDED RELEASE ORAL at 08:33

## 2022-05-05 RX ADMIN — SENNOSIDES AND DOCUSATE SODIUM 2 TABLET: 50; 8.6 TABLET ORAL at 08:33

## 2022-05-05 RX ADMIN — LISINOPRIL 40 MG: 20 TABLET ORAL at 08:33

## 2022-05-05 RX ADMIN — ENOXAPARIN SODIUM 40 MG: 100 INJECTION SUBCUTANEOUS at 15:17

## 2022-05-05 RX ADMIN — CARVEDILOL 12.5 MG: 6.25 TABLET, FILM COATED ORAL at 08:33

## 2022-05-05 RX ADMIN — LEVOTHYROXINE SODIUM 25 MCG: 0.03 TABLET ORAL at 08:33

## 2022-05-05 RX ADMIN — Medication 10 ML: at 08:34

## 2022-05-05 RX ADMIN — MULTIPLE VITAMINS W/ MINERALS TAB 1 TABLET: TAB at 08:33

## 2022-05-05 RX ADMIN — SUCRALFATE 1 G: 1 TABLET ORAL at 20:01

## 2022-05-05 RX ADMIN — MAGNESIUM SULFATE HEPTAHYDRATE 2 G: 2 INJECTION, SOLUTION INTRAVENOUS at 10:32

## 2022-05-05 RX ADMIN — MAGNESIUM SULFATE HEPTAHYDRATE 2 G: 2 INJECTION, SOLUTION INTRAVENOUS at 08:39

## 2022-05-05 RX ADMIN — SUCRALFATE 1 G: 1 TABLET ORAL at 08:33

## 2022-05-05 RX ADMIN — VENLAFAXINE HYDROCHLORIDE 75 MG: 75 CAPSULE, EXTENDED RELEASE ORAL at 08:33

## 2022-05-05 RX ADMIN — SODIUM CHLORIDE AND POTASSIUM CHLORIDE 100 ML/HR: .9; .15 SOLUTION INTRAVENOUS at 08:48

## 2022-05-05 RX ADMIN — ESTRADIOL 0.5 MG: 1 TABLET ORAL at 08:33

## 2022-05-05 RX ADMIN — ATORVASTATIN CALCIUM 20 MG: 20 TABLET, FILM COATED ORAL at 20:01

## 2022-05-05 RX ADMIN — BENZOCAINE AND MENTHOL 1 LOZENGE: 15; 3.6 LOZENGE ORAL at 15:35

## 2022-05-05 RX ADMIN — POTASSIUM CHLORIDE 40 MEQ: 1500 TABLET, EXTENDED RELEASE ORAL at 04:53

## 2022-05-05 RX ADMIN — HYDROCHLOROTHIAZIDE 25 MG: 25 TABLET ORAL at 08:33

## 2022-05-05 RX ADMIN — FERROUS SULFATE TAB EC 324 MG (65 MG FE EQUIVALENT) 324 MG: 324 (65 FE) TABLET DELAYED RESPONSE at 08:33

## 2022-05-05 RX ADMIN — BUDESONIDE AND FORMOTEROL FUMARATE DIHYDRATE 2 PUFF: 80; 4.5 AEROSOL RESPIRATORY (INHALATION) at 20:07

## 2022-05-05 RX ADMIN — PANTOPRAZOLE SODIUM 40 MG: 40 TABLET, DELAYED RELEASE ORAL at 08:33

## 2022-05-05 RX ADMIN — SENNOSIDES AND DOCUSATE SODIUM 2 TABLET: 50; 8.6 TABLET ORAL at 20:01

## 2022-05-05 RX ADMIN — CARVEDILOL 12.5 MG: 6.25 TABLET, FILM COATED ORAL at 17:30

## 2022-05-05 RX ADMIN — CALCIUM CARBONATE (ANTACID) CHEW TAB 500 MG 2 TABLET: 500 CHEW TAB at 08:48

## 2022-05-05 RX ADMIN — BUPROPION HYDROCHLORIDE 300 MG: 150 TABLET, EXTENDED RELEASE ORAL at 08:33

## 2022-05-05 RX ADMIN — OXYCODONE 7.5 MG: 5 TABLET ORAL at 17:33

## 2022-05-05 RX ADMIN — POTASSIUM CHLORIDE 40 MEQ: 1500 TABLET, EXTENDED RELEASE ORAL at 00:39

## 2022-05-05 RX ADMIN — Medication 1 CAPSULE: at 08:33

## 2022-05-05 RX ADMIN — BENZOCAINE AND MENTHOL 1 LOZENGE: 15; 3.6 LOZENGE ORAL at 08:49

## 2022-05-06 LAB
ANION GAP SERPL CALCULATED.3IONS-SCNC: 11 MMOL/L (ref 5–15)
BASOPHILS # BLD AUTO: 0 10*3/MM3 (ref 0–0.2)
BASOPHILS NFR BLD AUTO: 0.3 % (ref 0–1.5)
BUN SERPL-MCNC: 8 MG/DL (ref 8–23)
BUN/CREAT SERPL: 13.8 (ref 7–25)
CALCIUM SPEC-SCNC: 7.9 MG/DL (ref 8.6–10.5)
CHLORIDE SERPL-SCNC: 100 MMOL/L (ref 98–107)
CO2 SERPL-SCNC: 23 MMOL/L (ref 22–29)
CREAT SERPL-MCNC: 0.58 MG/DL (ref 0.57–1)
DEPRECATED RDW RBC AUTO: 45.9 FL (ref 37–54)
EGFRCR SERPLBLD CKD-EPI 2021: 90.5 ML/MIN/1.73
EOSINOPHIL # BLD AUTO: 0.2 10*3/MM3 (ref 0–0.4)
EOSINOPHIL NFR BLD AUTO: 3.4 % (ref 0.3–6.2)
ERYTHROCYTE [DISTWIDTH] IN BLOOD BY AUTOMATED COUNT: 15 % (ref 12.3–15.4)
GLUCOSE SERPL-MCNC: 118 MG/DL (ref 65–99)
HCT VFR BLD AUTO: 31.1 % (ref 34–46.6)
HGB BLD-MCNC: 10.1 G/DL (ref 12–15.9)
LYMPHOCYTES # BLD AUTO: 1.6 10*3/MM3 (ref 0.7–3.1)
LYMPHOCYTES NFR BLD AUTO: 22.8 % (ref 19.6–45.3)
MAGNESIUM SERPL-MCNC: 2 MG/DL (ref 1.6–2.4)
MCH RBC QN AUTO: 28.2 PG (ref 26.6–33)
MCHC RBC AUTO-ENTMCNC: 32.5 G/DL (ref 31.5–35.7)
MCV RBC AUTO: 86.8 FL (ref 79–97)
MONOCYTES # BLD AUTO: 0.6 10*3/MM3 (ref 0.1–0.9)
MONOCYTES NFR BLD AUTO: 8.9 % (ref 5–12)
NEUTROPHILS NFR BLD AUTO: 4.4 10*3/MM3 (ref 1.7–7)
NEUTROPHILS NFR BLD AUTO: 64.6 % (ref 42.7–76)
NRBC BLD AUTO-RTO: 0.1 /100 WBC (ref 0–0.2)
PLATELET # BLD AUTO: 193 10*3/MM3 (ref 140–450)
PMV BLD AUTO: 7.9 FL (ref 6–12)
POTASSIUM SERPL-SCNC: 3.7 MMOL/L (ref 3.5–5.2)
RBC # BLD AUTO: 3.58 10*6/MM3 (ref 3.77–5.28)
SODIUM SERPL-SCNC: 134 MMOL/L (ref 136–145)
WBC NRBC COR # BLD: 6.9 10*3/MM3 (ref 3.4–10.8)

## 2022-05-06 PROCEDURE — 99232 SBSQ HOSP IP/OBS MODERATE 35: CPT | Performed by: HOSPITALIST

## 2022-05-06 PROCEDURE — 94760 N-INVAS EAR/PLS OXIMETRY 1: CPT

## 2022-05-06 PROCEDURE — 85025 COMPLETE CBC W/AUTO DIFF WBC: CPT | Performed by: HOSPITALIST

## 2022-05-06 PROCEDURE — 94799 UNLISTED PULMONARY SVC/PX: CPT

## 2022-05-06 PROCEDURE — 25010000002 CEFTRIAXONE PER 250 MG: Performed by: HOSPITALIST

## 2022-05-06 PROCEDURE — 25010000002 ENOXAPARIN PER 10 MG: Performed by: HOSPITALIST

## 2022-05-06 PROCEDURE — 83735 ASSAY OF MAGNESIUM: CPT | Performed by: HOSPITALIST

## 2022-05-06 PROCEDURE — 97530 THERAPEUTIC ACTIVITIES: CPT

## 2022-05-06 PROCEDURE — 80048 BASIC METABOLIC PNL TOTAL CA: CPT | Performed by: HOSPITALIST

## 2022-05-06 PROCEDURE — 94664 DEMO&/EVAL PT USE INHALER: CPT

## 2022-05-06 RX ORDER — ALPRAZOLAM 0.25 MG/1
0.25 TABLET ORAL 2 TIMES DAILY PRN
Status: DISCONTINUED | OUTPATIENT
Start: 2022-05-06 | End: 2022-05-11 | Stop reason: HOSPADM

## 2022-05-06 RX ADMIN — CALCIUM CARBONATE (ANTACID) CHEW TAB 500 MG 2 TABLET: 500 CHEW TAB at 11:31

## 2022-05-06 RX ADMIN — MULTIPLE VITAMINS W/ MINERALS TAB 1 TABLET: TAB at 08:38

## 2022-05-06 RX ADMIN — FERROUS SULFATE TAB EC 324 MG (65 MG FE EQUIVALENT) 324 MG: 324 (65 FE) TABLET DELAYED RESPONSE at 08:38

## 2022-05-06 RX ADMIN — VENLAFAXINE HYDROCHLORIDE 75 MG: 75 CAPSULE, EXTENDED RELEASE ORAL at 08:38

## 2022-05-06 RX ADMIN — LEVOTHYROXINE SODIUM 25 MCG: 0.03 TABLET ORAL at 06:04

## 2022-05-06 RX ADMIN — BUPROPION HYDROCHLORIDE 300 MG: 150 TABLET, EXTENDED RELEASE ORAL at 08:38

## 2022-05-06 RX ADMIN — URSODIOL 500 MG: 500 TABLET, FILM COATED ORAL at 08:38

## 2022-05-06 RX ADMIN — Medication 10 ML: at 20:02

## 2022-05-06 RX ADMIN — LISINOPRIL 40 MG: 20 TABLET ORAL at 08:38

## 2022-05-06 RX ADMIN — SENNOSIDES AND DOCUSATE SODIUM 2 TABLET: 50; 8.6 TABLET ORAL at 08:38

## 2022-05-06 RX ADMIN — Medication 10 ML: at 08:39

## 2022-05-06 RX ADMIN — ATORVASTATIN CALCIUM 20 MG: 20 TABLET, FILM COATED ORAL at 20:02

## 2022-05-06 RX ADMIN — CEFTRIAXONE 1 G: 1 INJECTION, POWDER, FOR SOLUTION INTRAMUSCULAR; INTRAVENOUS at 15:45

## 2022-05-06 RX ADMIN — CARVEDILOL 12.5 MG: 6.25 TABLET, FILM COATED ORAL at 08:38

## 2022-05-06 RX ADMIN — SUCRALFATE 1 G: 1 TABLET ORAL at 20:02

## 2022-05-06 RX ADMIN — BUDESONIDE AND FORMOTEROL FUMARATE DIHYDRATE 2 PUFF: 80; 4.5 AEROSOL RESPIRATORY (INHALATION) at 19:41

## 2022-05-06 RX ADMIN — CARVEDILOL 12.5 MG: 6.25 TABLET, FILM COATED ORAL at 17:12

## 2022-05-06 RX ADMIN — PANTOPRAZOLE SODIUM 40 MG: 40 TABLET, DELAYED RELEASE ORAL at 06:04

## 2022-05-06 RX ADMIN — ENOXAPARIN SODIUM 40 MG: 100 INJECTION SUBCUTANEOUS at 15:45

## 2022-05-06 RX ADMIN — HYDROCHLOROTHIAZIDE 25 MG: 25 TABLET ORAL at 08:38

## 2022-05-06 RX ADMIN — ESTRADIOL 0.5 MG: 1 TABLET ORAL at 08:38

## 2022-05-06 RX ADMIN — SUCRALFATE 1 G: 1 TABLET ORAL at 08:38

## 2022-05-06 RX ADMIN — Medication 1 CAPSULE: at 08:38

## 2022-05-06 RX ADMIN — AMLODIPINE BESYLATE 10 MG: 5 TABLET ORAL at 08:38

## 2022-05-06 RX ADMIN — OXYCODONE 7.5 MG: 5 TABLET ORAL at 20:02

## 2022-05-06 RX ADMIN — URSODIOL 500 MG: 500 TABLET, FILM COATED ORAL at 20:02

## 2022-05-06 RX ADMIN — BUDESONIDE AND FORMOTEROL FUMARATE DIHYDRATE 2 PUFF: 80; 4.5 AEROSOL RESPIRATORY (INHALATION) at 07:07

## 2022-05-06 RX ADMIN — BENZOCAINE AND MENTHOL 1 LOZENGE: 15; 3.6 LOZENGE ORAL at 04:00

## 2022-05-07 PROCEDURE — 94799 UNLISTED PULMONARY SVC/PX: CPT

## 2022-05-07 PROCEDURE — 25010000002 ENOXAPARIN PER 10 MG: Performed by: HOSPITALIST

## 2022-05-07 PROCEDURE — 99232 SBSQ HOSP IP/OBS MODERATE 35: CPT | Performed by: HOSPITALIST

## 2022-05-07 RX ORDER — CEPHALEXIN 500 MG/1
500 CAPSULE ORAL EVERY 6 HOURS
Status: DISCONTINUED | OUTPATIENT
Start: 2022-05-07 | End: 2022-05-11 | Stop reason: HOSPADM

## 2022-05-07 RX ADMIN — ATORVASTATIN CALCIUM 20 MG: 20 TABLET, FILM COATED ORAL at 21:10

## 2022-05-07 RX ADMIN — ENOXAPARIN SODIUM 40 MG: 100 INJECTION SUBCUTANEOUS at 17:48

## 2022-05-07 RX ADMIN — URSODIOL 500 MG: 500 TABLET, FILM COATED ORAL at 21:10

## 2022-05-07 RX ADMIN — SUCRALFATE 1 G: 1 TABLET ORAL at 17:48

## 2022-05-07 RX ADMIN — OXYCODONE 7.5 MG: 5 TABLET ORAL at 08:43

## 2022-05-07 RX ADMIN — LISINOPRIL 40 MG: 20 TABLET ORAL at 08:43

## 2022-05-07 RX ADMIN — LEVOTHYROXINE SODIUM 25 MCG: 0.03 TABLET ORAL at 05:23

## 2022-05-07 RX ADMIN — CEPHALEXIN 500 MG: 500 CAPSULE ORAL at 17:48

## 2022-05-07 RX ADMIN — Medication 1 CAPSULE: at 08:43

## 2022-05-07 RX ADMIN — CARVEDILOL 12.5 MG: 6.25 TABLET, FILM COATED ORAL at 08:44

## 2022-05-07 RX ADMIN — VENLAFAXINE HYDROCHLORIDE 75 MG: 75 CAPSULE, EXTENDED RELEASE ORAL at 08:43

## 2022-05-07 RX ADMIN — MULTIPLE VITAMINS W/ MINERALS TAB 1 TABLET: TAB at 08:43

## 2022-05-07 RX ADMIN — BENZOCAINE AND MENTHOL 1 LOZENGE: 15; 3.6 LOZENGE ORAL at 08:45

## 2022-05-07 RX ADMIN — AMLODIPINE BESYLATE 10 MG: 5 TABLET ORAL at 08:43

## 2022-05-07 RX ADMIN — PANTOPRAZOLE SODIUM 40 MG: 40 TABLET, DELAYED RELEASE ORAL at 05:23

## 2022-05-07 RX ADMIN — BUDESONIDE AND FORMOTEROL FUMARATE DIHYDRATE 2 PUFF: 80; 4.5 AEROSOL RESPIRATORY (INHALATION) at 20:07

## 2022-05-07 RX ADMIN — Medication 10 ML: at 21:10

## 2022-05-07 RX ADMIN — URSODIOL 500 MG: 500 TABLET, FILM COATED ORAL at 08:43

## 2022-05-07 RX ADMIN — SUCRALFATE 1 G: 1 TABLET ORAL at 08:43

## 2022-05-07 RX ADMIN — OXYCODONE 7.5 MG: 5 TABLET ORAL at 21:11

## 2022-05-07 RX ADMIN — CARVEDILOL 12.5 MG: 6.25 TABLET, FILM COATED ORAL at 17:48

## 2022-05-07 RX ADMIN — CEPHALEXIN 500 MG: 500 CAPSULE ORAL at 21:10

## 2022-05-07 RX ADMIN — HYDROCHLOROTHIAZIDE 25 MG: 25 TABLET ORAL at 08:43

## 2022-05-07 RX ADMIN — ESTRADIOL 0.5 MG: 1 TABLET ORAL at 08:43

## 2022-05-07 RX ADMIN — BUPROPION HYDROCHLORIDE 300 MG: 150 TABLET, EXTENDED RELEASE ORAL at 08:43

## 2022-05-07 RX ADMIN — Medication 10 ML: at 08:42

## 2022-05-07 RX ADMIN — FERROUS SULFATE TAB EC 324 MG (65 MG FE EQUIVALENT) 324 MG: 324 (65 FE) TABLET DELAYED RESPONSE at 08:43

## 2022-05-08 LAB — QT INTERVAL: 449 MS

## 2022-05-08 PROCEDURE — 99232 SBSQ HOSP IP/OBS MODERATE 35: CPT | Performed by: HOSPITALIST

## 2022-05-08 PROCEDURE — 94799 UNLISTED PULMONARY SVC/PX: CPT

## 2022-05-08 PROCEDURE — 25010000002 ENOXAPARIN PER 10 MG: Performed by: HOSPITALIST

## 2022-05-08 RX ADMIN — URSODIOL 500 MG: 500 TABLET, FILM COATED ORAL at 09:00

## 2022-05-08 RX ADMIN — Medication 1 CAPSULE: at 09:00

## 2022-05-08 RX ADMIN — CEPHALEXIN 500 MG: 500 CAPSULE ORAL at 17:10

## 2022-05-08 RX ADMIN — LISINOPRIL 40 MG: 20 TABLET ORAL at 09:00

## 2022-05-08 RX ADMIN — AMLODIPINE BESYLATE 10 MG: 5 TABLET ORAL at 09:00

## 2022-05-08 RX ADMIN — MULTIPLE VITAMINS W/ MINERALS TAB 1 TABLET: TAB at 09:00

## 2022-05-08 RX ADMIN — HYDROCHLOROTHIAZIDE 25 MG: 25 TABLET ORAL at 09:00

## 2022-05-08 RX ADMIN — SENNOSIDES AND DOCUSATE SODIUM 2 TABLET: 50; 8.6 TABLET ORAL at 09:00

## 2022-05-08 RX ADMIN — BUPROPION HYDROCHLORIDE 300 MG: 150 TABLET, EXTENDED RELEASE ORAL at 09:00

## 2022-05-08 RX ADMIN — SUCRALFATE 1 G: 1 TABLET ORAL at 20:25

## 2022-05-08 RX ADMIN — SUCRALFATE 1 G: 1 TABLET ORAL at 17:10

## 2022-05-08 RX ADMIN — LEVOTHYROXINE SODIUM 25 MCG: 0.03 TABLET ORAL at 05:06

## 2022-05-08 RX ADMIN — CARVEDILOL 12.5 MG: 6.25 TABLET, FILM COATED ORAL at 09:00

## 2022-05-08 RX ADMIN — CEPHALEXIN 500 MG: 500 CAPSULE ORAL at 05:06

## 2022-05-08 RX ADMIN — ESTRADIOL 0.5 MG: 1 TABLET ORAL at 09:00

## 2022-05-08 RX ADMIN — CEPHALEXIN 500 MG: 500 CAPSULE ORAL at 09:00

## 2022-05-08 RX ADMIN — CARVEDILOL 12.5 MG: 6.25 TABLET, FILM COATED ORAL at 17:10

## 2022-05-08 RX ADMIN — VENLAFAXINE HYDROCHLORIDE 75 MG: 75 CAPSULE, EXTENDED RELEASE ORAL at 09:00

## 2022-05-08 RX ADMIN — ATORVASTATIN CALCIUM 20 MG: 20 TABLET, FILM COATED ORAL at 20:25

## 2022-05-08 RX ADMIN — ENOXAPARIN SODIUM 40 MG: 100 INJECTION SUBCUTANEOUS at 17:10

## 2022-05-08 RX ADMIN — Medication 10 ML: at 09:00

## 2022-05-08 RX ADMIN — OXYCODONE 7.5 MG: 5 TABLET ORAL at 17:10

## 2022-05-08 RX ADMIN — FERROUS SULFATE TAB EC 324 MG (65 MG FE EQUIVALENT) 324 MG: 324 (65 FE) TABLET DELAYED RESPONSE at 09:00

## 2022-05-08 RX ADMIN — CEPHALEXIN 500 MG: 500 CAPSULE ORAL at 21:38

## 2022-05-08 RX ADMIN — BUDESONIDE AND FORMOTEROL FUMARATE DIHYDRATE 2 PUFF: 80; 4.5 AEROSOL RESPIRATORY (INHALATION) at 07:44

## 2022-05-08 RX ADMIN — SUCRALFATE 1 G: 1 TABLET ORAL at 09:00

## 2022-05-08 RX ADMIN — Medication 10 ML: at 20:26

## 2022-05-08 RX ADMIN — PANTOPRAZOLE SODIUM 40 MG: 40 TABLET, DELAYED RELEASE ORAL at 05:06

## 2022-05-08 RX ADMIN — URSODIOL 500 MG: 500 TABLET, FILM COATED ORAL at 20:25

## 2022-05-08 RX ADMIN — OXYCODONE 7.5 MG: 5 TABLET ORAL at 11:48

## 2022-05-08 RX ADMIN — BUDESONIDE AND FORMOTEROL FUMARATE DIHYDRATE 2 PUFF: 80; 4.5 AEROSOL RESPIRATORY (INHALATION) at 20:09

## 2022-05-09 PROCEDURE — 97530 THERAPEUTIC ACTIVITIES: CPT

## 2022-05-09 PROCEDURE — 94799 UNLISTED PULMONARY SVC/PX: CPT

## 2022-05-09 PROCEDURE — 99232 SBSQ HOSP IP/OBS MODERATE 35: CPT | Performed by: INTERNAL MEDICINE

## 2022-05-09 PROCEDURE — 97535 SELF CARE MNGMENT TRAINING: CPT

## 2022-05-09 PROCEDURE — 97116 GAIT TRAINING THERAPY: CPT

## 2022-05-09 PROCEDURE — 25010000002 ONDANSETRON PER 1 MG: Performed by: INTERNAL MEDICINE

## 2022-05-09 PROCEDURE — 25010000002 ENOXAPARIN PER 10 MG: Performed by: HOSPITALIST

## 2022-05-09 RX ORDER — ONDANSETRON 2 MG/ML
4 INJECTION INTRAMUSCULAR; INTRAVENOUS EVERY 6 HOURS PRN
Status: DISCONTINUED | OUTPATIENT
Start: 2022-05-09 | End: 2022-05-11 | Stop reason: HOSPADM

## 2022-05-09 RX ADMIN — HYDROCHLOROTHIAZIDE 25 MG: 25 TABLET ORAL at 10:00

## 2022-05-09 RX ADMIN — CARVEDILOL 12.5 MG: 6.25 TABLET, FILM COATED ORAL at 10:00

## 2022-05-09 RX ADMIN — OXYCODONE 7.5 MG: 5 TABLET ORAL at 07:13

## 2022-05-09 RX ADMIN — Medication 10 ML: at 21:13

## 2022-05-09 RX ADMIN — FERROUS SULFATE TAB EC 324 MG (65 MG FE EQUIVALENT) 324 MG: 324 (65 FE) TABLET DELAYED RESPONSE at 10:00

## 2022-05-09 RX ADMIN — ONDANSETRON 4 MG: 2 INJECTION INTRAMUSCULAR; INTRAVENOUS at 17:05

## 2022-05-09 RX ADMIN — OXYCODONE 7.5 MG: 5 TABLET ORAL at 21:10

## 2022-05-09 RX ADMIN — Medication 10 ML: at 10:00

## 2022-05-09 RX ADMIN — ENOXAPARIN SODIUM 40 MG: 100 INJECTION SUBCUTANEOUS at 17:00

## 2022-05-09 RX ADMIN — PHENOL 1 SPRAY: 1.4 SPRAY ORAL at 11:40

## 2022-05-09 RX ADMIN — LEVOTHYROXINE SODIUM 25 MCG: 0.03 TABLET ORAL at 04:56

## 2022-05-09 RX ADMIN — AMLODIPINE BESYLATE 10 MG: 5 TABLET ORAL at 10:00

## 2022-05-09 RX ADMIN — CEPHALEXIN 500 MG: 500 CAPSULE ORAL at 17:00

## 2022-05-09 RX ADMIN — CEPHALEXIN 500 MG: 500 CAPSULE ORAL at 21:10

## 2022-05-09 RX ADMIN — SUCRALFATE 1 G: 1 TABLET ORAL at 21:10

## 2022-05-09 RX ADMIN — SUCRALFATE 1 G: 1 TABLET ORAL at 17:00

## 2022-05-09 RX ADMIN — BENZOCAINE AND MENTHOL 1 LOZENGE: 15; 3.6 LOZENGE ORAL at 11:40

## 2022-05-09 RX ADMIN — CARVEDILOL 12.5 MG: 6.25 TABLET, FILM COATED ORAL at 17:04

## 2022-05-09 RX ADMIN — Medication 10 ML: at 21:14

## 2022-05-09 RX ADMIN — BUDESONIDE AND FORMOTEROL FUMARATE DIHYDRATE 2 PUFF: 80; 4.5 AEROSOL RESPIRATORY (INHALATION) at 19:10

## 2022-05-09 RX ADMIN — SUCRALFATE 1 G: 1 TABLET ORAL at 10:00

## 2022-05-09 RX ADMIN — Medication 1 CAPSULE: at 10:00

## 2022-05-09 RX ADMIN — SENNOSIDES AND DOCUSATE SODIUM 2 TABLET: 50; 8.6 TABLET ORAL at 10:00

## 2022-05-09 RX ADMIN — PANTOPRAZOLE SODIUM 40 MG: 40 TABLET, DELAYED RELEASE ORAL at 04:56

## 2022-05-09 RX ADMIN — MULTIPLE VITAMINS W/ MINERALS TAB 1 TABLET: TAB at 10:00

## 2022-05-09 RX ADMIN — VENLAFAXINE HYDROCHLORIDE 75 MG: 75 CAPSULE, EXTENDED RELEASE ORAL at 10:00

## 2022-05-09 RX ADMIN — CEPHALEXIN 500 MG: 500 CAPSULE ORAL at 10:17

## 2022-05-09 RX ADMIN — ATORVASTATIN CALCIUM 20 MG: 20 TABLET, FILM COATED ORAL at 21:10

## 2022-05-09 RX ADMIN — LISINOPRIL 40 MG: 20 TABLET ORAL at 10:00

## 2022-05-09 RX ADMIN — BUDESONIDE AND FORMOTEROL FUMARATE DIHYDRATE 2 PUFF: 80; 4.5 AEROSOL RESPIRATORY (INHALATION) at 07:18

## 2022-05-09 RX ADMIN — BUPROPION HYDROCHLORIDE 300 MG: 150 TABLET, EXTENDED RELEASE ORAL at 10:00

## 2022-05-09 RX ADMIN — URSODIOL 500 MG: 500 TABLET, FILM COATED ORAL at 10:00

## 2022-05-09 RX ADMIN — ESTRADIOL 0.5 MG: 1 TABLET ORAL at 10:00

## 2022-05-09 RX ADMIN — URSODIOL 500 MG: 500 TABLET, FILM COATED ORAL at 21:10

## 2022-05-09 RX ADMIN — CEPHALEXIN 500 MG: 500 CAPSULE ORAL at 04:53

## 2022-05-10 LAB — BACTERIA ISLT: NORMAL

## 2022-05-10 PROCEDURE — 25010000002 ENOXAPARIN PER 10 MG: Performed by: HOSPITALIST

## 2022-05-10 PROCEDURE — 94799 UNLISTED PULMONARY SVC/PX: CPT

## 2022-05-10 PROCEDURE — 99232 SBSQ HOSP IP/OBS MODERATE 35: CPT | Performed by: INTERNAL MEDICINE

## 2022-05-10 PROCEDURE — 94664 DEMO&/EVAL PT USE INHALER: CPT

## 2022-05-10 RX ADMIN — MULTIPLE VITAMINS W/ MINERALS TAB 1 TABLET: TAB at 07:57

## 2022-05-10 RX ADMIN — URSODIOL 500 MG: 500 TABLET, FILM COATED ORAL at 21:07

## 2022-05-10 RX ADMIN — HYDROCHLOROTHIAZIDE 25 MG: 25 TABLET ORAL at 07:57

## 2022-05-10 RX ADMIN — OXYCODONE 7.5 MG: 5 TABLET ORAL at 07:59

## 2022-05-10 RX ADMIN — CEPHALEXIN 500 MG: 500 CAPSULE ORAL at 05:15

## 2022-05-10 RX ADMIN — OXYCODONE 7.5 MG: 5 TABLET ORAL at 14:02

## 2022-05-10 RX ADMIN — AMLODIPINE BESYLATE 10 MG: 5 TABLET ORAL at 07:57

## 2022-05-10 RX ADMIN — VENLAFAXINE HYDROCHLORIDE 75 MG: 75 CAPSULE, EXTENDED RELEASE ORAL at 07:56

## 2022-05-10 RX ADMIN — PANTOPRAZOLE SODIUM 40 MG: 40 TABLET, DELAYED RELEASE ORAL at 05:16

## 2022-05-10 RX ADMIN — Medication 1 CAPSULE: at 07:57

## 2022-05-10 RX ADMIN — SUCRALFATE 1 G: 1 TABLET ORAL at 07:59

## 2022-05-10 RX ADMIN — CEPHALEXIN 500 MG: 500 CAPSULE ORAL at 09:24

## 2022-05-10 RX ADMIN — LEVOTHYROXINE SODIUM 25 MCG: 0.03 TABLET ORAL at 05:15

## 2022-05-10 RX ADMIN — Medication 10 ML: at 07:59

## 2022-05-10 RX ADMIN — CARVEDILOL 12.5 MG: 6.25 TABLET, FILM COATED ORAL at 07:58

## 2022-05-10 RX ADMIN — FERROUS SULFATE TAB EC 324 MG (65 MG FE EQUIVALENT) 324 MG: 324 (65 FE) TABLET DELAYED RESPONSE at 07:57

## 2022-05-10 RX ADMIN — LISINOPRIL 40 MG: 20 TABLET ORAL at 07:58

## 2022-05-10 RX ADMIN — PHENOL 1 SPRAY: 1.4 SPRAY ORAL at 17:38

## 2022-05-10 RX ADMIN — ATORVASTATIN CALCIUM 20 MG: 20 TABLET, FILM COATED ORAL at 21:07

## 2022-05-10 RX ADMIN — SUCRALFATE 1 G: 1 TABLET ORAL at 16:06

## 2022-05-10 RX ADMIN — Medication 10 ML: at 21:07

## 2022-05-10 RX ADMIN — SENNOSIDES AND DOCUSATE SODIUM 2 TABLET: 50; 8.6 TABLET ORAL at 21:07

## 2022-05-10 RX ADMIN — BUPROPION HYDROCHLORIDE 300 MG: 150 TABLET, EXTENDED RELEASE ORAL at 07:57

## 2022-05-10 RX ADMIN — URSODIOL 500 MG: 500 TABLET, FILM COATED ORAL at 07:59

## 2022-05-10 RX ADMIN — BUDESONIDE AND FORMOTEROL FUMARATE DIHYDRATE 2 PUFF: 80; 4.5 AEROSOL RESPIRATORY (INHALATION) at 06:54

## 2022-05-10 RX ADMIN — BENZOCAINE AND MENTHOL 1 LOZENGE: 15; 3.6 LOZENGE ORAL at 17:38

## 2022-05-10 RX ADMIN — CEPHALEXIN 500 MG: 500 CAPSULE ORAL at 16:06

## 2022-05-10 RX ADMIN — ENOXAPARIN SODIUM 40 MG: 100 INJECTION SUBCUTANEOUS at 16:06

## 2022-05-10 RX ADMIN — ESTRADIOL 0.5 MG: 1 TABLET ORAL at 07:57

## 2022-05-10 RX ADMIN — OXYCODONE 7.5 MG: 5 TABLET ORAL at 21:11

## 2022-05-10 RX ADMIN — CARVEDILOL 12.5 MG: 6.25 TABLET, FILM COATED ORAL at 17:38

## 2022-05-10 RX ADMIN — CEPHALEXIN 500 MG: 500 CAPSULE ORAL at 21:07

## 2022-05-10 RX ADMIN — SUCRALFATE 1 G: 1 TABLET ORAL at 21:07

## 2022-05-11 VITALS
SYSTOLIC BLOOD PRESSURE: 165 MMHG | RESPIRATION RATE: 18 BRPM | WEIGHT: 163.8 LBS | BODY MASS INDEX: 26.33 KG/M2 | HEIGHT: 66 IN | DIASTOLIC BLOOD PRESSURE: 72 MMHG | TEMPERATURE: 98.1 F | HEART RATE: 75 BPM | OXYGEN SATURATION: 95 %

## 2022-05-11 PROCEDURE — 94799 UNLISTED PULMONARY SVC/PX: CPT

## 2022-05-11 PROCEDURE — 94664 DEMO&/EVAL PT USE INHALER: CPT

## 2022-05-11 PROCEDURE — 94760 N-INVAS EAR/PLS OXIMETRY 1: CPT

## 2022-05-11 PROCEDURE — 99239 HOSP IP/OBS DSCHRG MGMT >30: CPT | Performed by: INTERNAL MEDICINE

## 2022-05-11 RX ORDER — CEPHALEXIN 500 MG/1
500 CAPSULE ORAL EVERY 6 HOURS
Qty: 4 CAPSULE | Refills: 0 | Status: SHIPPED | OUTPATIENT
Start: 2022-05-11 | End: 2022-05-12

## 2022-05-11 RX ADMIN — SUCRALFATE 1 G: 1 TABLET ORAL at 08:22

## 2022-05-11 RX ADMIN — VENLAFAXINE HYDROCHLORIDE 75 MG: 75 CAPSULE, EXTENDED RELEASE ORAL at 08:22

## 2022-05-11 RX ADMIN — LEVOTHYROXINE SODIUM 25 MCG: 0.03 TABLET ORAL at 05:06

## 2022-05-11 RX ADMIN — Medication 10 ML: at 08:23

## 2022-05-11 RX ADMIN — SENNOSIDES AND DOCUSATE SODIUM 2 TABLET: 50; 8.6 TABLET ORAL at 08:22

## 2022-05-11 RX ADMIN — AMLODIPINE BESYLATE 10 MG: 5 TABLET ORAL at 08:22

## 2022-05-11 RX ADMIN — CARVEDILOL 12.5 MG: 6.25 TABLET, FILM COATED ORAL at 08:22

## 2022-05-11 RX ADMIN — BUPROPION HYDROCHLORIDE 300 MG: 150 TABLET, EXTENDED RELEASE ORAL at 08:22

## 2022-05-11 RX ADMIN — URSODIOL 500 MG: 500 TABLET, FILM COATED ORAL at 08:22

## 2022-05-11 RX ADMIN — HYDROCHLOROTHIAZIDE 25 MG: 25 TABLET ORAL at 08:22

## 2022-05-11 RX ADMIN — BUDESONIDE AND FORMOTEROL FUMARATE DIHYDRATE 2 PUFF: 80; 4.5 AEROSOL RESPIRATORY (INHALATION) at 07:05

## 2022-05-11 RX ADMIN — ESTRADIOL 0.5 MG: 1 TABLET ORAL at 08:22

## 2022-05-11 RX ADMIN — CEPHALEXIN 500 MG: 500 CAPSULE ORAL at 05:06

## 2022-05-11 RX ADMIN — PANTOPRAZOLE SODIUM 40 MG: 40 TABLET, DELAYED RELEASE ORAL at 05:06

## 2022-05-11 RX ADMIN — FERROUS SULFATE TAB EC 324 MG (65 MG FE EQUIVALENT) 324 MG: 324 (65 FE) TABLET DELAYED RESPONSE at 08:22

## 2022-05-11 RX ADMIN — LISINOPRIL 40 MG: 20 TABLET ORAL at 08:22

## 2022-05-11 RX ADMIN — MULTIPLE VITAMINS W/ MINERALS TAB 1 TABLET: TAB at 08:22

## 2022-05-11 RX ADMIN — CEPHALEXIN 500 MG: 500 CAPSULE ORAL at 08:24

## 2022-05-11 RX ADMIN — Medication 1 CAPSULE: at 08:22

## 2022-11-13 ENCOUNTER — APPOINTMENT (OUTPATIENT)
Dept: CT IMAGING | Facility: HOSPITAL | Age: 82
End: 2022-11-13

## 2022-11-13 ENCOUNTER — APPOINTMENT (OUTPATIENT)
Dept: GENERAL RADIOLOGY | Facility: HOSPITAL | Age: 82
End: 2022-11-13

## 2022-11-13 ENCOUNTER — HOSPITAL ENCOUNTER (INPATIENT)
Facility: HOSPITAL | Age: 82
LOS: 3 days | Discharge: NURSING, REHAB OR LONG-TERM CARE | End: 2022-11-16
Attending: EMERGENCY MEDICINE | Admitting: INTERNAL MEDICINE

## 2022-11-13 DIAGNOSIS — T40.601A OPIATE OVERDOSE, ACCIDENTAL OR UNINTENTIONAL, INITIAL ENCOUNTER: ICD-10-CM

## 2022-11-13 DIAGNOSIS — G93.41 METABOLIC ENCEPHALOPATHY: ICD-10-CM

## 2022-11-13 DIAGNOSIS — N39.0 ACUTE UTI: Primary | ICD-10-CM

## 2022-11-13 PROBLEM — R53.1 ACUTE RIGHT-SIDED WEAKNESS: Status: ACTIVE | Noted: 2022-11-13

## 2022-11-13 LAB
ABO GROUP BLD: NORMAL
ALBUMIN SERPL-MCNC: 3.7 G/DL (ref 3.5–5.2)
ALBUMIN/GLOB SERPL: 1.4 G/DL
ALP SERPL-CCNC: 106 U/L (ref 39–117)
ALT SERPL W P-5'-P-CCNC: 30 U/L (ref 1–33)
AMPHET+METHAMPHET UR QL: NEGATIVE
ANION GAP SERPL CALCULATED.3IONS-SCNC: 14 MMOL/L (ref 5–15)
ANION GAP SERPL CALCULATED.3IONS-SCNC: 9 MMOL/L (ref 5–15)
APTT PPP: 29.9 SECONDS (ref 24–31)
AST SERPL-CCNC: 47 U/L (ref 1–32)
BACTERIA UR QL AUTO: ABNORMAL /HPF
BARBITURATES UR QL SCN: NEGATIVE
BASOPHILS # BLD AUTO: 0 10*3/MM3 (ref 0–0.2)
BASOPHILS # BLD AUTO: 0.1 10*3/MM3 (ref 0–0.2)
BASOPHILS NFR BLD AUTO: 0.1 % (ref 0–1.5)
BASOPHILS NFR BLD AUTO: 0.4 % (ref 0–1.5)
BENZODIAZ UR QL SCN: NEGATIVE
BILIRUB SERPL-MCNC: 0.6 MG/DL (ref 0–1.2)
BILIRUB UR QL STRIP: NEGATIVE
BLD GP AB SCN SERPL QL: NEGATIVE
BUN SERPL-MCNC: 23 MG/DL (ref 8–23)
BUN SERPL-MCNC: 31 MG/DL (ref 8–23)
BUN/CREAT SERPL: 19.7 (ref 7–25)
BUN/CREAT SERPL: 25.6 (ref 7–25)
CALCIUM SPEC-SCNC: 9.2 MG/DL (ref 8.6–10.5)
CALCIUM SPEC-SCNC: 9.3 MG/DL (ref 8.6–10.5)
CANNABINOIDS SERPL QL: NEGATIVE
CHLORIDE SERPL-SCNC: 95 MMOL/L (ref 98–107)
CHLORIDE SERPL-SCNC: 97 MMOL/L (ref 98–107)
CHOLEST SERPL-MCNC: 112 MG/DL (ref 0–200)
CLARITY UR: CLEAR
CO2 SERPL-SCNC: 26 MMOL/L (ref 22–29)
CO2 SERPL-SCNC: 30 MMOL/L (ref 22–29)
COCAINE UR QL: NEGATIVE
COLOR UR: YELLOW
CREAT SERPL-MCNC: 1.17 MG/DL (ref 0.57–1)
CREAT SERPL-MCNC: 1.21 MG/DL (ref 0.57–1)
D-LACTATE SERPL-SCNC: 2.1 MMOL/L (ref 0.5–2)
DEPRECATED RDW RBC AUTO: 50.3 FL (ref 37–54)
DEPRECATED RDW RBC AUTO: 51.6 FL (ref 37–54)
EGFRCR SERPLBLD CKD-EPI 2021: 44.8 ML/MIN/1.73
EGFRCR SERPLBLD CKD-EPI 2021: 46.7 ML/MIN/1.73
EOSINOPHIL # BLD AUTO: 0 10*3/MM3 (ref 0–0.4)
EOSINOPHIL # BLD AUTO: 0.2 10*3/MM3 (ref 0–0.4)
EOSINOPHIL NFR BLD AUTO: 0.1 % (ref 0.3–6.2)
EOSINOPHIL NFR BLD AUTO: 1.3 % (ref 0.3–6.2)
ERYTHROCYTE [DISTWIDTH] IN BLOOD BY AUTOMATED COUNT: 16.3 % (ref 12.3–15.4)
ERYTHROCYTE [DISTWIDTH] IN BLOOD BY AUTOMATED COUNT: 16.3 % (ref 12.3–15.4)
GLOBULIN UR ELPH-MCNC: 2.6 GM/DL
GLUCOSE BLDC GLUCOMTR-MCNC: 109 MG/DL (ref 70–105)
GLUCOSE SERPL-MCNC: 132 MG/DL (ref 65–99)
GLUCOSE SERPL-MCNC: 144 MG/DL (ref 65–99)
GLUCOSE UR STRIP-MCNC: NEGATIVE MG/DL
HCT VFR BLD AUTO: 36 % (ref 34–46.6)
HCT VFR BLD AUTO: 36.8 % (ref 34–46.6)
HDLC SERPL-MCNC: 49 MG/DL (ref 40–60)
HGB BLD-MCNC: 11.6 G/DL (ref 12–15.9)
HGB BLD-MCNC: 12.1 G/DL (ref 12–15.9)
HGB UR QL STRIP.AUTO: ABNORMAL
HOLD SPECIMEN: NORMAL
HYALINE CASTS UR QL AUTO: ABNORMAL /LPF
INR PPP: 1.1 (ref 0.93–1.1)
KETONES UR QL STRIP: NEGATIVE
LDLC SERPL CALC-MCNC: 41 MG/DL (ref 0–100)
LDLC/HDLC SERPL: 0.78 {RATIO}
LEUKOCYTE ESTERASE UR QL STRIP.AUTO: ABNORMAL
LYMPHOCYTES # BLD AUTO: 1.4 10*3/MM3 (ref 0.7–3.1)
LYMPHOCYTES # BLD AUTO: 1.5 10*3/MM3 (ref 0.7–3.1)
LYMPHOCYTES NFR BLD AUTO: 5.3 % (ref 19.6–45.3)
LYMPHOCYTES NFR BLD AUTO: 9.1 % (ref 19.6–45.3)
MAGNESIUM SERPL-MCNC: 1.9 MG/DL (ref 1.6–2.4)
MCH RBC QN AUTO: 28.1 PG (ref 26.6–33)
MCH RBC QN AUTO: 28.3 PG (ref 26.6–33)
MCHC RBC AUTO-ENTMCNC: 32.2 G/DL (ref 31.5–35.7)
MCHC RBC AUTO-ENTMCNC: 32.8 G/DL (ref 31.5–35.7)
MCV RBC AUTO: 85.9 FL (ref 79–97)
MCV RBC AUTO: 88.1 FL (ref 79–97)
METHADONE UR QL SCN: NEGATIVE
MONOCYTES # BLD AUTO: 0.3 10*3/MM3 (ref 0.1–0.9)
MONOCYTES # BLD AUTO: 1.6 10*3/MM3 (ref 0.1–0.9)
MONOCYTES NFR BLD AUTO: 1.8 % (ref 5–12)
MONOCYTES NFR BLD AUTO: 6.3 % (ref 5–12)
NEUTROPHILS NFR BLD AUTO: 14.5 10*3/MM3 (ref 1.7–7)
NEUTROPHILS NFR BLD AUTO: 22.5 10*3/MM3 (ref 1.7–7)
NEUTROPHILS NFR BLD AUTO: 87.4 % (ref 42.7–76)
NEUTROPHILS NFR BLD AUTO: 88.2 % (ref 42.7–76)
NITRITE UR QL STRIP: NEGATIVE
NRBC BLD AUTO-RTO: 0 /100 WBC (ref 0–0.2)
NRBC BLD AUTO-RTO: 0 /100 WBC (ref 0–0.2)
OPIATES UR QL: POSITIVE
OXYCODONE UR QL SCN: NEGATIVE
PH UR STRIP.AUTO: 5.5 [PH] (ref 5–8)
PLATELET # BLD AUTO: 336 10*3/MM3 (ref 140–450)
PLATELET # BLD AUTO: 361 10*3/MM3 (ref 140–450)
PMV BLD AUTO: 8.1 FL (ref 6–12)
PMV BLD AUTO: 8.2 FL (ref 6–12)
POTASSIUM SERPL-SCNC: 4.9 MMOL/L (ref 3.5–5.2)
POTASSIUM SERPL-SCNC: 5.2 MMOL/L (ref 3.5–5.2)
PROT SERPL-MCNC: 6.3 G/DL (ref 6–8.5)
PROT UR QL STRIP: NEGATIVE
PROTHROMBIN TIME: 11.3 SECONDS (ref 9.6–11.7)
RBC # BLD AUTO: 4.09 10*6/MM3 (ref 3.77–5.28)
RBC # BLD AUTO: 4.29 10*6/MM3 (ref 3.77–5.28)
RBC # UR STRIP: ABNORMAL /HPF
REF LAB TEST METHOD: ABNORMAL
RH BLD: POSITIVE
SODIUM SERPL-SCNC: 134 MMOL/L (ref 136–145)
SODIUM SERPL-SCNC: 137 MMOL/L (ref 136–145)
SP GR UR STRIP: 1.02 (ref 1–1.03)
SQUAMOUS #/AREA URNS HPF: ABNORMAL /HPF
T&S EXPIRATION DATE: NORMAL
TRIGL SERPL-MCNC: 125 MG/DL (ref 0–150)
TROPONIN T SERPL-MCNC: <0.01 NG/ML (ref 0–0.03)
TSH SERPL DL<=0.05 MIU/L-ACNC: 5.38 UIU/ML (ref 0.27–4.2)
UROBILINOGEN UR QL STRIP: ABNORMAL
VIT B12 BLD-MCNC: 549 PG/ML (ref 211–946)
VLDLC SERPL-MCNC: 22 MG/DL (ref 5–40)
WBC # UR STRIP: ABNORMAL /HPF
WBC NRBC COR # BLD: 16.6 10*3/MM3 (ref 3.4–10.8)
WBC NRBC COR # BLD: 25.5 10*3/MM3 (ref 3.4–10.8)
WHOLE BLOOD HOLD COAG: NORMAL
WHOLE BLOOD HOLD SPECIMEN: NORMAL

## 2022-11-13 PROCEDURE — 80307 DRUG TEST PRSMV CHEM ANLYZR: CPT | Performed by: EMERGENCY MEDICINE

## 2022-11-13 PROCEDURE — 83735 ASSAY OF MAGNESIUM: CPT | Performed by: INTERNAL MEDICINE

## 2022-11-13 PROCEDURE — 70496 CT ANGIOGRAPHY HEAD: CPT

## 2022-11-13 PROCEDURE — 25010000002 ONDANSETRON PER 1 MG

## 2022-11-13 PROCEDURE — 86850 RBC ANTIBODY SCREEN: CPT | Performed by: EMERGENCY MEDICINE

## 2022-11-13 PROCEDURE — 4A03X5D MEASUREMENT OF ARTERIAL FLOW, INTRACRANIAL, EXTERNAL APPROACH: ICD-10-PCS | Performed by: RADIOLOGY

## 2022-11-13 PROCEDURE — 81001 URINALYSIS AUTO W/SCOPE: CPT | Performed by: EMERGENCY MEDICINE

## 2022-11-13 PROCEDURE — 0042T HC CT CEREBRAL PERFUSION W/WO CONTRAST: CPT

## 2022-11-13 PROCEDURE — P9612 CATHETERIZE FOR URINE SPEC: HCPCS

## 2022-11-13 PROCEDURE — 84443 ASSAY THYROID STIM HORMONE: CPT | Performed by: NURSE PRACTITIONER

## 2022-11-13 PROCEDURE — 80053 COMPREHEN METABOLIC PANEL: CPT | Performed by: EMERGENCY MEDICINE

## 2022-11-13 PROCEDURE — 84484 ASSAY OF TROPONIN QUANT: CPT | Performed by: EMERGENCY MEDICINE

## 2022-11-13 PROCEDURE — 25010000002 NALOXONE HCL 2 MG/2ML SOLUTION PREFILLED SYRINGE

## 2022-11-13 PROCEDURE — 94799 UNLISTED PULMONARY SVC/PX: CPT

## 2022-11-13 PROCEDURE — 85730 THROMBOPLASTIN TIME PARTIAL: CPT | Performed by: EMERGENCY MEDICINE

## 2022-11-13 PROCEDURE — 25010000002 LORAZEPAM PER 2 MG

## 2022-11-13 PROCEDURE — 93005 ELECTROCARDIOGRAM TRACING: CPT | Performed by: EMERGENCY MEDICINE

## 2022-11-13 PROCEDURE — 70498 CT ANGIOGRAPHY NECK: CPT

## 2022-11-13 PROCEDURE — 85025 COMPLETE CBC W/AUTO DIFF WBC: CPT | Performed by: EMERGENCY MEDICINE

## 2022-11-13 PROCEDURE — 71045 X-RAY EXAM CHEST 1 VIEW: CPT

## 2022-11-13 PROCEDURE — 86901 BLOOD TYPING SEROLOGIC RH(D): CPT

## 2022-11-13 PROCEDURE — 80061 LIPID PANEL: CPT | Performed by: NURSE PRACTITIONER

## 2022-11-13 PROCEDURE — 25010000002 ENOXAPARIN PER 10 MG: Performed by: INTERNAL MEDICINE

## 2022-11-13 PROCEDURE — 85025 COMPLETE CBC W/AUTO DIFF WBC: CPT | Performed by: INTERNAL MEDICINE

## 2022-11-13 PROCEDURE — 87040 BLOOD CULTURE FOR BACTERIA: CPT | Performed by: INTERNAL MEDICINE

## 2022-11-13 PROCEDURE — 83605 ASSAY OF LACTIC ACID: CPT | Performed by: INTERNAL MEDICINE

## 2022-11-13 PROCEDURE — 99223 1ST HOSP IP/OBS HIGH 75: CPT | Performed by: NURSE PRACTITIONER

## 2022-11-13 PROCEDURE — 87086 URINE CULTURE/COLONY COUNT: CPT | Performed by: EMERGENCY MEDICINE

## 2022-11-13 PROCEDURE — 86901 BLOOD TYPING SEROLOGIC RH(D): CPT | Performed by: EMERGENCY MEDICINE

## 2022-11-13 PROCEDURE — 83036 HEMOGLOBIN GLYCOSYLATED A1C: CPT | Performed by: NURSE PRACTITIONER

## 2022-11-13 PROCEDURE — 0 IOPAMIDOL PER 1 ML: Performed by: EMERGENCY MEDICINE

## 2022-11-13 PROCEDURE — 82607 VITAMIN B-12: CPT | Performed by: NURSE PRACTITIONER

## 2022-11-13 PROCEDURE — 86900 BLOOD TYPING SEROLOGIC ABO: CPT

## 2022-11-13 PROCEDURE — 85610 PROTHROMBIN TIME: CPT | Performed by: EMERGENCY MEDICINE

## 2022-11-13 PROCEDURE — 99285 EMERGENCY DEPT VISIT HI MDM: CPT

## 2022-11-13 PROCEDURE — 25010000002 CEFTRIAXONE PER 250 MG: Performed by: EMERGENCY MEDICINE

## 2022-11-13 PROCEDURE — 82962 GLUCOSE BLOOD TEST: CPT

## 2022-11-13 PROCEDURE — 86900 BLOOD TYPING SEROLOGIC ABO: CPT | Performed by: EMERGENCY MEDICINE

## 2022-11-13 PROCEDURE — 25010000002 NALOXONE PER 1 MG: Performed by: EMERGENCY MEDICINE

## 2022-11-13 PROCEDURE — 36415 COLL VENOUS BLD VENIPUNCTURE: CPT | Performed by: EMERGENCY MEDICINE

## 2022-11-13 PROCEDURE — 70450 CT HEAD/BRAIN W/O DYE: CPT

## 2022-11-13 RX ORDER — ACETAMINOPHEN 650 MG/1
650 SUPPOSITORY RECTAL EVERY 4 HOURS PRN
Status: DISCONTINUED | OUTPATIENT
Start: 2022-11-13 | End: 2022-11-16 | Stop reason: HOSPADM

## 2022-11-13 RX ORDER — SODIUM CHLORIDE 0.9 % (FLUSH) 0.9 %
10 SYRINGE (ML) INJECTION AS NEEDED
Status: DISCONTINUED | OUTPATIENT
Start: 2022-11-13 | End: 2022-11-16 | Stop reason: HOSPADM

## 2022-11-13 RX ORDER — ONDANSETRON 4 MG/1
4 TABLET, FILM COATED ORAL EVERY 6 HOURS PRN
COMMUNITY

## 2022-11-13 RX ORDER — SODIUM PHOSPHATE, DIBASIC AND SODIUM PHOSPHATE, MONOBASIC 7; 19 G/133ML; G/133ML
1 ENEMA RECTAL AS NEEDED
COMMUNITY

## 2022-11-13 RX ORDER — AMOXICILLIN 250 MG
2 CAPSULE ORAL 2 TIMES DAILY
Status: DISCONTINUED | OUTPATIENT
Start: 2022-11-13 | End: 2022-11-16 | Stop reason: HOSPADM

## 2022-11-13 RX ORDER — BISACODYL 5 MG/1
5 TABLET, DELAYED RELEASE ORAL DAILY PRN
Status: DISCONTINUED | OUTPATIENT
Start: 2022-11-13 | End: 2022-11-16 | Stop reason: HOSPADM

## 2022-11-13 RX ORDER — PROMETHAZINE HYDROCHLORIDE 12.5 MG/1
12.5 TABLET ORAL EVERY 6 HOURS PRN
Status: DISCONTINUED | OUTPATIENT
Start: 2022-11-13 | End: 2022-11-14

## 2022-11-13 RX ORDER — LORAZEPAM 2 MG/ML
INJECTION INTRAMUSCULAR
Status: COMPLETED
Start: 2022-11-13 | End: 2022-11-13

## 2022-11-13 RX ORDER — BISACODYL 10 MG
10 SUPPOSITORY, RECTAL RECTAL DAILY PRN
Status: DISCONTINUED | OUTPATIENT
Start: 2022-11-13 | End: 2022-11-16 | Stop reason: HOSPADM

## 2022-11-13 RX ORDER — BISACODYL 10 MG
10 SUPPOSITORY, RECTAL RECTAL AS NEEDED
COMMUNITY

## 2022-11-13 RX ORDER — POLYETHYLENE GLYCOL 3350 17 G/17G
17 POWDER, FOR SOLUTION ORAL DAILY PRN
Status: DISCONTINUED | OUTPATIENT
Start: 2022-11-13 | End: 2022-11-16 | Stop reason: HOSPADM

## 2022-11-13 RX ORDER — ACETAMINOPHEN 325 MG/1
650 TABLET ORAL EVERY 4 HOURS PRN
Status: DISCONTINUED | OUTPATIENT
Start: 2022-11-13 | End: 2022-11-16 | Stop reason: HOSPADM

## 2022-11-13 RX ORDER — ASPIRIN 81 MG/1
81 TABLET, CHEWABLE ORAL DAILY
Status: DISCONTINUED | OUTPATIENT
Start: 2022-11-13 | End: 2022-11-16 | Stop reason: HOSPADM

## 2022-11-13 RX ORDER — MAGNESIUM OXIDE 400 MG/1
400 TABLET ORAL 3 TIMES DAILY
COMMUNITY

## 2022-11-13 RX ORDER — METOCLOPRAMIDE 5 MG/1
5 TABLET ORAL NIGHTLY
COMMUNITY

## 2022-11-13 RX ORDER — ASPIRIN 300 MG/1
300 SUPPOSITORY RECTAL DAILY
Status: DISCONTINUED | OUTPATIENT
Start: 2022-11-13 | End: 2022-11-16 | Stop reason: HOSPADM

## 2022-11-13 RX ORDER — ONDANSETRON 2 MG/ML
INJECTION INTRAMUSCULAR; INTRAVENOUS
Status: COMPLETED
Start: 2022-11-13 | End: 2022-11-13

## 2022-11-13 RX ORDER — SODIUM CHLORIDE 0.9 % (FLUSH) 0.9 %
10 SYRINGE (ML) INJECTION EVERY 12 HOURS SCHEDULED
Status: DISCONTINUED | OUTPATIENT
Start: 2022-11-13 | End: 2022-11-16 | Stop reason: HOSPADM

## 2022-11-13 RX ORDER — POTASSIUM CHLORIDE 20 MEQ/1
20 TABLET, EXTENDED RELEASE ORAL EVERY MORNING
COMMUNITY

## 2022-11-13 RX ORDER — ACETAMINOPHEN 325 MG/1
650 TABLET ORAL EVERY 6 HOURS PRN
COMMUNITY

## 2022-11-13 RX ORDER — PROMETHAZINE HYDROCHLORIDE 12.5 MG/1
12.5 SUPPOSITORY RECTAL EVERY 6 HOURS PRN
Status: DISCONTINUED | OUTPATIENT
Start: 2022-11-13 | End: 2022-11-16 | Stop reason: HOSPADM

## 2022-11-13 RX ORDER — IBUPROFEN 600 MG/1
1 TABLET ORAL AS NEEDED
COMMUNITY

## 2022-11-13 RX ORDER — NALOXONE HCL 0.4 MG/ML
0.4 VIAL (ML) INJECTION ONCE
Status: COMPLETED | OUTPATIENT
Start: 2022-11-13 | End: 2022-11-13

## 2022-11-13 RX ORDER — ACETAMINOPHEN 160 MG/5ML
650 SOLUTION ORAL EVERY 4 HOURS PRN
Status: DISCONTINUED | OUTPATIENT
Start: 2022-11-13 | End: 2022-11-16 | Stop reason: HOSPADM

## 2022-11-13 RX ORDER — NITROGLYCERIN 0.4 MG/1
0.4 TABLET SUBLINGUAL
Status: DISCONTINUED | OUTPATIENT
Start: 2022-11-13 | End: 2022-11-14

## 2022-11-13 RX ORDER — ONDANSETRON 2 MG/ML
4 INJECTION INTRAMUSCULAR; INTRAVENOUS ONCE
Status: COMPLETED | OUTPATIENT
Start: 2022-11-13 | End: 2022-11-13

## 2022-11-13 RX ORDER — NALOXONE HYDROCHLORIDE 1 MG/ML
INJECTION INTRAMUSCULAR; INTRAVENOUS; SUBCUTANEOUS
Status: COMPLETED
Start: 2022-11-13 | End: 2022-11-13

## 2022-11-13 RX ORDER — NICOTINE POLACRILEX 4 MG
15 LOZENGE BUCCAL
COMMUNITY

## 2022-11-13 RX ORDER — ATORVASTATIN CALCIUM 40 MG/1
40 TABLET, FILM COATED ORAL NIGHTLY
Status: DISCONTINUED | OUTPATIENT
Start: 2022-11-13 | End: 2022-11-14

## 2022-11-13 RX ORDER — ENOXAPARIN SODIUM 100 MG/ML
40 INJECTION SUBCUTANEOUS DAILY
Status: DISCONTINUED | OUTPATIENT
Start: 2022-11-13 | End: 2022-11-16 | Stop reason: HOSPADM

## 2022-11-13 RX ORDER — SACCHAROMYCES BOULARDII 250 MG
250 CAPSULE ORAL DAILY
COMMUNITY

## 2022-11-13 RX ORDER — LORAZEPAM 2 MG/ML
2 INJECTION INTRAMUSCULAR ONCE
Status: COMPLETED | OUTPATIENT
Start: 2022-11-13 | End: 2022-11-13

## 2022-11-13 RX ORDER — NALOXONE HCL 0.4 MG/ML
1 VIAL (ML) INJECTION ONCE
Status: COMPLETED | OUTPATIENT
Start: 2022-11-13 | End: 2022-11-13

## 2022-11-13 RX ADMIN — Medication 10 ML: at 22:06

## 2022-11-13 RX ADMIN — ASPIRIN 300 MG: 300 SUPPOSITORY RECTAL at 13:45

## 2022-11-13 RX ADMIN — NALOXONE HYDROCHLORIDE 0.4 MG: 1 INJECTION PARENTERAL at 12:50

## 2022-11-13 RX ADMIN — CEFTRIAXONE 2 G: 2 INJECTION, POWDER, FOR SOLUTION INTRAMUSCULAR; INTRAVENOUS at 13:20

## 2022-11-13 RX ADMIN — IOPAMIDOL 50 ML: 755 INJECTION, SOLUTION INTRAVENOUS at 12:09

## 2022-11-13 RX ADMIN — IOPAMIDOL 100 ML: 755 INJECTION, SOLUTION INTRAVENOUS at 12:06

## 2022-11-13 RX ADMIN — ENOXAPARIN SODIUM 40 MG: 100 INJECTION SUBCUTANEOUS at 22:06

## 2022-11-13 RX ADMIN — LORAZEPAM 1 MG: 2 INJECTION INTRAMUSCULAR; INTRAVENOUS at 12:46

## 2022-11-13 RX ADMIN — ONDANSETRON 4 MG: 2 INJECTION INTRAMUSCULAR; INTRAVENOUS at 13:34

## 2022-11-13 RX ADMIN — NALOXONE HYDROCHLORIDE 1 MG: 0.4 INJECTION, SOLUTION INTRAMUSCULAR; INTRAVENOUS; SUBCUTANEOUS at 13:20

## 2022-11-13 RX ADMIN — LORAZEPAM 1 MG: 2 INJECTION INTRAMUSCULAR at 12:46

## 2022-11-14 ENCOUNTER — APPOINTMENT (OUTPATIENT)
Dept: CARDIOLOGY | Facility: HOSPITAL | Age: 82
End: 2022-11-14

## 2022-11-14 ENCOUNTER — APPOINTMENT (OUTPATIENT)
Dept: MRI IMAGING | Facility: HOSPITAL | Age: 82
End: 2022-11-14

## 2022-11-14 LAB
ANION GAP SERPL CALCULATED.3IONS-SCNC: 14 MMOL/L (ref 5–15)
BACTERIA SPEC AEROBE CULT: NO GROWTH
BASOPHILS # BLD AUTO: 0 10*3/MM3 (ref 0–0.2)
BASOPHILS NFR BLD AUTO: 0.1 % (ref 0–1.5)
BH CV ECHO MEAS - ACS: 1.29 CM
BH CV ECHO MEAS - AO MAX PG: 22.4 MMHG
BH CV ECHO MEAS - AO MEAN PG: 12.8 MMHG
BH CV ECHO MEAS - AO ROOT DIAM: 3.3 CM
BH CV ECHO MEAS - AO V2 MAX: 233.5 CM/SEC
BH CV ECHO MEAS - AO V2 VTI: 41.9 CM
BH CV ECHO MEAS - AVA(I,D): 1.97 CM2
BH CV ECHO MEAS - EDV(CUBED): 71.4 ML
BH CV ECHO MEAS - EDV(MOD-SP4): 47.8 ML
BH CV ECHO MEAS - EF(MOD-SP4): 63.3 %
BH CV ECHO MEAS - ESV(CUBED): 21.2 ML
BH CV ECHO MEAS - ESV(MOD-SP4): 17.5 ML
BH CV ECHO MEAS - FS: 33.3 %
BH CV ECHO MEAS - IVS/LVPW: 1.08 CM
BH CV ECHO MEAS - IVSD: 1.25 CM
BH CV ECHO MEAS - LA DIMENSION: 4.9 CM
BH CV ECHO MEAS - LV DIASTOLIC VOL/BSA (35-75): 25.6 CM2
BH CV ECHO MEAS - LV MASS(C)D: 175.2 GRAMS
BH CV ECHO MEAS - LV MAX PG: 8.5 MMHG
BH CV ECHO MEAS - LV MEAN PG: 5.3 MMHG
BH CV ECHO MEAS - LV SYSTOLIC VOL/BSA (12-30): 9.4 CM2
BH CV ECHO MEAS - LV V1 MAX: 145.5 CM/SEC
BH CV ECHO MEAS - LV V1 VTI: 28.8 CM
BH CV ECHO MEAS - LVIDD: 4.1 CM
BH CV ECHO MEAS - LVIDS: 2.8 CM
BH CV ECHO MEAS - LVOT AREA: 2.9 CM2
BH CV ECHO MEAS - LVOT DIAM: 1.91 CM
BH CV ECHO MEAS - LVPWD: 1.16 CM
BH CV ECHO MEAS - MV A MAX VEL: 176.9 CM/SEC
BH CV ECHO MEAS - MV DEC SLOPE: 499 CM/SEC2
BH CV ECHO MEAS - MV DEC TIME: 0.24 MSEC
BH CV ECHO MEAS - MV E MAX VEL: 118.7 CM/SEC
BH CV ECHO MEAS - MV E/A: 0.67
BH CV ECHO MEAS - MV MAX PG: 10.5 MMHG
BH CV ECHO MEAS - MV MEAN PG: 4.8 MMHG
BH CV ECHO MEAS - MV V2 VTI: 31.9 CM
BH CV ECHO MEAS - MVA(VTI): 2.6 CM2
BH CV ECHO MEAS - PA ACC TIME: 0.11 SEC
BH CV ECHO MEAS - PA PR(ACCEL): 30.7 MMHG
BH CV ECHO MEAS - PA V2 MAX: 133.3 CM/SEC
BH CV ECHO MEAS - RAP SYSTOLE: 3 MMHG
BH CV ECHO MEAS - RV MAX PG: 6.2 MMHG
BH CV ECHO MEAS - RV V1 MAX: 125 CM/SEC
BH CV ECHO MEAS - RV V1 VTI: 23.8 CM
BH CV ECHO MEAS - RVDD: 2.5 CM
BH CV ECHO MEAS - RVSP: 35.6 MMHG
BH CV ECHO MEAS - SI(MOD-SP4): 16.2 ML/M2
BH CV ECHO MEAS - SV(LVOT): 82.8 ML
BH CV ECHO MEAS - SV(MOD-SP4): 30.3 ML
BH CV ECHO MEAS - TR MAX PG: 32.6 MMHG
BH CV ECHO MEAS - TR MAX VEL: 284.3 CM/SEC
BUN SERPL-MCNC: 41 MG/DL (ref 8–23)
BUN/CREAT SERPL: 28.9 (ref 7–25)
CALCIUM SPEC-SCNC: 9.3 MG/DL (ref 8.6–10.5)
CHLORIDE SERPL-SCNC: 98 MMOL/L (ref 98–107)
CO2 SERPL-SCNC: 28 MMOL/L (ref 22–29)
CREAT SERPL-MCNC: 1.42 MG/DL (ref 0.57–1)
D-LACTATE SERPL-SCNC: 1.7 MMOL/L (ref 0.5–2)
DEPRECATED RDW RBC AUTO: 52.5 FL (ref 37–54)
EGFRCR SERPLBLD CKD-EPI 2021: 37 ML/MIN/1.73
EOSINOPHIL # BLD AUTO: 0 10*3/MM3 (ref 0–0.4)
EOSINOPHIL NFR BLD AUTO: 0.1 % (ref 0.3–6.2)
ERYTHROCYTE [DISTWIDTH] IN BLOOD BY AUTOMATED COUNT: 16.5 % (ref 12.3–15.4)
GLUCOSE BLDC GLUCOMTR-MCNC: 114 MG/DL (ref 70–105)
GLUCOSE BLDC GLUCOMTR-MCNC: 132 MG/DL (ref 70–105)
GLUCOSE BLDC GLUCOMTR-MCNC: 95 MG/DL (ref 70–105)
GLUCOSE SERPL-MCNC: 139 MG/DL (ref 65–99)
HBA1C MFR BLD: 4.9 % (ref 3.5–5.6)
HCT VFR BLD AUTO: 35.5 % (ref 34–46.6)
HGB BLD-MCNC: 11.7 G/DL (ref 12–15.9)
LYMPHOCYTES # BLD AUTO: 2.1 10*3/MM3 (ref 0.7–3.1)
LYMPHOCYTES NFR BLD AUTO: 9.3 % (ref 19.6–45.3)
MAGNESIUM SERPL-MCNC: 2 MG/DL (ref 1.6–2.4)
MAXIMAL PREDICTED HEART RATE: 138 BPM
MCH RBC QN AUTO: 28.1 PG (ref 26.6–33)
MCHC RBC AUTO-ENTMCNC: 32.9 G/DL (ref 31.5–35.7)
MCV RBC AUTO: 85.5 FL (ref 79–97)
MONOCYTES # BLD AUTO: 1.9 10*3/MM3 (ref 0.1–0.9)
MONOCYTES NFR BLD AUTO: 8.2 % (ref 5–12)
NEUTROPHILS NFR BLD AUTO: 18.8 10*3/MM3 (ref 1.7–7)
NEUTROPHILS NFR BLD AUTO: 82.3 % (ref 42.7–76)
NRBC BLD AUTO-RTO: 0.1 /100 WBC (ref 0–0.2)
PLATELET # BLD AUTO: 348 10*3/MM3 (ref 140–450)
PMV BLD AUTO: 8.4 FL (ref 6–12)
POTASSIUM SERPL-SCNC: 5.2 MMOL/L (ref 3.5–5.2)
QT INTERVAL: 380 MS
RBC # BLD AUTO: 4.15 10*6/MM3 (ref 3.77–5.28)
SODIUM SERPL-SCNC: 140 MMOL/L (ref 136–145)
STRESS TARGET HR: 117 BPM
WBC NRBC COR # BLD: 22.8 10*3/MM3 (ref 3.4–10.8)

## 2022-11-14 PROCEDURE — 92610 EVALUATE SWALLOWING FUNCTION: CPT

## 2022-11-14 PROCEDURE — 82962 GLUCOSE BLOOD TEST: CPT

## 2022-11-14 PROCEDURE — 80048 BASIC METABOLIC PNL TOTAL CA: CPT | Performed by: INTERNAL MEDICINE

## 2022-11-14 PROCEDURE — 99233 SBSQ HOSP IP/OBS HIGH 50: CPT | Performed by: NURSE PRACTITIONER

## 2022-11-14 PROCEDURE — 83735 ASSAY OF MAGNESIUM: CPT | Performed by: INTERNAL MEDICINE

## 2022-11-14 PROCEDURE — 83605 ASSAY OF LACTIC ACID: CPT | Performed by: INTERNAL MEDICINE

## 2022-11-14 PROCEDURE — 97535 SELF CARE MNGMENT TRAINING: CPT

## 2022-11-14 PROCEDURE — 97166 OT EVAL MOD COMPLEX 45 MIN: CPT

## 2022-11-14 PROCEDURE — 70551 MRI BRAIN STEM W/O DYE: CPT

## 2022-11-14 PROCEDURE — 97530 THERAPEUTIC ACTIVITIES: CPT

## 2022-11-14 PROCEDURE — 97162 PT EVAL MOD COMPLEX 30 MIN: CPT

## 2022-11-14 PROCEDURE — 25010000002 ENOXAPARIN PER 10 MG: Performed by: INTERNAL MEDICINE

## 2022-11-14 PROCEDURE — 93306 TTE W/DOPPLER COMPLETE: CPT | Performed by: INTERNAL MEDICINE

## 2022-11-14 PROCEDURE — 85025 COMPLETE CBC W/AUTO DIFF WBC: CPT | Performed by: INTERNAL MEDICINE

## 2022-11-14 PROCEDURE — 25010000002 CEFTRIAXONE PER 250 MG: Performed by: INTERNAL MEDICINE

## 2022-11-14 PROCEDURE — 93306 TTE W/DOPPLER COMPLETE: CPT

## 2022-11-14 RX ORDER — SACCHAROMYCES BOULARDII 250 MG
250 CAPSULE ORAL DAILY
Status: DISCONTINUED | OUTPATIENT
Start: 2022-11-14 | End: 2022-11-16 | Stop reason: HOSPADM

## 2022-11-14 RX ORDER — PANTOPRAZOLE SODIUM 40 MG/1
40 TABLET, DELAYED RELEASE ORAL EVERY MORNING
Status: DISCONTINUED | OUTPATIENT
Start: 2022-11-15 | End: 2022-11-16 | Stop reason: HOSPADM

## 2022-11-14 RX ORDER — FERROUS SULFATE TAB EC 324 MG (65 MG FE EQUIVALENT) 324 (65 FE) MG
324 TABLET DELAYED RESPONSE ORAL
Status: DISCONTINUED | OUTPATIENT
Start: 2022-11-14 | End: 2022-11-16 | Stop reason: HOSPADM

## 2022-11-14 RX ORDER — ESTRADIOL 1 MG/1
0.5 TABLET ORAL DAILY
Status: DISCONTINUED | OUTPATIENT
Start: 2022-11-14 | End: 2022-11-16 | Stop reason: HOSPADM

## 2022-11-14 RX ORDER — DICYCLOMINE HYDROCHLORIDE 10 MG/1
10 CAPSULE ORAL EVERY 6 HOURS SCHEDULED
Status: DISCONTINUED | OUTPATIENT
Start: 2022-11-14 | End: 2022-11-16 | Stop reason: HOSPADM

## 2022-11-14 RX ORDER — DIPHENOXYLATE HYDROCHLORIDE AND ATROPINE SULFATE 2.5; .025 MG/1; MG/1
1 TABLET ORAL EVERY 6 HOURS PRN
Status: DISCONTINUED | OUTPATIENT
Start: 2022-11-14 | End: 2022-11-16 | Stop reason: HOSPADM

## 2022-11-14 RX ORDER — CARVEDILOL 6.25 MG/1
12.5 TABLET ORAL 2 TIMES DAILY WITH MEALS
Status: DISCONTINUED | OUTPATIENT
Start: 2022-11-14 | End: 2022-11-16 | Stop reason: HOSPADM

## 2022-11-14 RX ORDER — MULTIPLE VITAMINS W/ MINERALS TAB 9MG-400MCG
1 TAB ORAL DAILY
Status: DISCONTINUED | OUTPATIENT
Start: 2022-11-14 | End: 2022-11-16 | Stop reason: HOSPADM

## 2022-11-14 RX ORDER — POTASSIUM CHLORIDE 20 MEQ/1
20 TABLET, EXTENDED RELEASE ORAL EVERY MORNING
Status: DISCONTINUED | OUTPATIENT
Start: 2022-11-15 | End: 2022-11-16 | Stop reason: HOSPADM

## 2022-11-14 RX ORDER — LISINOPRIL 20 MG/1
20 TABLET ORAL
Status: DISCONTINUED | OUTPATIENT
Start: 2022-11-14 | End: 2022-11-16 | Stop reason: HOSPADM

## 2022-11-14 RX ORDER — NITROGLYCERIN 0.4 MG/1
0.4 TABLET SUBLINGUAL
Status: DISCONTINUED | OUTPATIENT
Start: 2022-11-14 | End: 2022-11-16 | Stop reason: HOSPADM

## 2022-11-14 RX ORDER — SODIUM PHOSPHATE, DIBASIC AND SODIUM PHOSPHATE, MONOBASIC 7; 19 G/133ML; G/133ML
1 ENEMA RECTAL ONCE AS NEEDED
Status: DISCONTINUED | OUTPATIENT
Start: 2022-11-14 | End: 2022-11-16 | Stop reason: HOSPADM

## 2022-11-14 RX ORDER — AMLODIPINE BESYLATE 5 MG/1
10 TABLET ORAL DAILY
Status: DISCONTINUED | OUTPATIENT
Start: 2022-11-14 | End: 2022-11-16 | Stop reason: HOSPADM

## 2022-11-14 RX ORDER — BUPROPION HYDROCHLORIDE 150 MG/1
150 TABLET ORAL DAILY
Status: DISCONTINUED | OUTPATIENT
Start: 2022-11-14 | End: 2022-11-14

## 2022-11-14 RX ORDER — SUCRALFATE 1 G/1
1 TABLET ORAL
Status: DISCONTINUED | OUTPATIENT
Start: 2022-11-14 | End: 2022-11-16 | Stop reason: HOSPADM

## 2022-11-14 RX ORDER — VENLAFAXINE HYDROCHLORIDE 75 MG/1
150 CAPSULE, EXTENDED RELEASE ORAL DAILY
Status: DISCONTINUED | OUTPATIENT
Start: 2022-11-14 | End: 2022-11-16 | Stop reason: HOSPADM

## 2022-11-14 RX ORDER — HYDROCHLOROTHIAZIDE 25 MG/1
25 TABLET ORAL DAILY
Status: DISCONTINUED | OUTPATIENT
Start: 2022-11-14 | End: 2022-11-16 | Stop reason: HOSPADM

## 2022-11-14 RX ORDER — ATORVASTATIN CALCIUM 20 MG/1
20 TABLET, FILM COATED ORAL NIGHTLY
Status: DISCONTINUED | OUTPATIENT
Start: 2022-11-14 | End: 2022-11-16 | Stop reason: HOSPADM

## 2022-11-14 RX ORDER — CLONAZEPAM 0.5 MG/1
0.5 TABLET ORAL NIGHTLY
Status: DISCONTINUED | OUTPATIENT
Start: 2022-11-14 | End: 2022-11-16 | Stop reason: HOSPADM

## 2022-11-14 RX ORDER — LEVOTHYROXINE SODIUM 0.03 MG/1
25 TABLET ORAL
Status: DISCONTINUED | OUTPATIENT
Start: 2022-11-15 | End: 2022-11-16 | Stop reason: HOSPADM

## 2022-11-14 RX ORDER — GABAPENTIN 100 MG/1
100 CAPSULE ORAL 3 TIMES DAILY
Status: DISCONTINUED | OUTPATIENT
Start: 2022-11-14 | End: 2022-11-16 | Stop reason: HOSPADM

## 2022-11-14 RX ORDER — METOCLOPRAMIDE 5 MG/1
5 TABLET ORAL NIGHTLY
Status: DISCONTINUED | OUTPATIENT
Start: 2022-11-14 | End: 2022-11-16 | Stop reason: HOSPADM

## 2022-11-14 RX ORDER — ACETAMINOPHEN 325 MG/1
650 TABLET ORAL EVERY 6 HOURS PRN
Status: DISCONTINUED | OUTPATIENT
Start: 2022-11-14 | End: 2022-11-14 | Stop reason: SDUPTHER

## 2022-11-14 RX ORDER — NYSTATIN 100000 [USP'U]/G
1 POWDER TOPICAL 2 TIMES DAILY
Status: DISCONTINUED | OUTPATIENT
Start: 2022-11-14 | End: 2022-11-16 | Stop reason: HOSPADM

## 2022-11-14 RX ORDER — ALBUTEROL SULFATE 2.5 MG/3ML
2.5 SOLUTION RESPIRATORY (INHALATION) EVERY 6 HOURS PRN
Status: DISCONTINUED | OUTPATIENT
Start: 2022-11-14 | End: 2022-11-16 | Stop reason: HOSPADM

## 2022-11-14 RX ORDER — URSODIOL 500 MG/1
500 TABLET, FILM COATED ORAL 2 TIMES DAILY
Status: DISCONTINUED | OUTPATIENT
Start: 2022-11-14 | End: 2022-11-16 | Stop reason: HOSPADM

## 2022-11-14 RX ORDER — BISACODYL 10 MG
10 SUPPOSITORY, RECTAL RECTAL DAILY PRN
Status: DISCONTINUED | OUTPATIENT
Start: 2022-11-14 | End: 2022-11-14 | Stop reason: SDUPTHER

## 2022-11-14 RX ADMIN — DICYCLOMINE HYDROCHLORIDE 10 MG: 10 CAPSULE ORAL at 20:21

## 2022-11-14 RX ADMIN — CEFTRIAXONE 2 G: 2 INJECTION, POWDER, FOR SOLUTION INTRAMUSCULAR; INTRAVENOUS at 15:28

## 2022-11-14 RX ADMIN — NYSTATIN 1 APPLICATION: 100000 POWDER TOPICAL at 15:28

## 2022-11-14 RX ADMIN — DICYCLOMINE HYDROCHLORIDE 10 MG: 10 CAPSULE ORAL at 23:57

## 2022-11-14 RX ADMIN — ACETAMINOPHEN 650 MG: 325 TABLET, FILM COATED ORAL at 20:34

## 2022-11-14 RX ADMIN — NYSTATIN 1 APPLICATION: 100000 POWDER TOPICAL at 20:27

## 2022-11-14 RX ADMIN — AMLODIPINE BESYLATE 10 MG: 5 TABLET ORAL at 15:27

## 2022-11-14 RX ADMIN — ATORVASTATIN CALCIUM 20 MG: 20 TABLET, FILM COATED ORAL at 20:10

## 2022-11-14 RX ADMIN — CLONAZEPAM 0.5 MG: 0.5 TABLET ORAL at 20:10

## 2022-11-14 RX ADMIN — MULTIPLE VITAMINS W/ MINERALS TAB 1 TABLET: TAB at 15:27

## 2022-11-14 RX ADMIN — Medication 10 ML: at 10:15

## 2022-11-14 RX ADMIN — CARVEDILOL 12.5 MG: 6.25 TABLET, FILM COATED ORAL at 20:21

## 2022-11-14 RX ADMIN — HYDROCHLOROTHIAZIDE 25 MG: 25 TABLET ORAL at 15:27

## 2022-11-14 RX ADMIN — SENNOSIDES AND DOCUSATE SODIUM 2 TABLET: 50; 8.6 TABLET ORAL at 20:10

## 2022-11-14 RX ADMIN — Medication 250 MG: at 15:27

## 2022-11-14 RX ADMIN — SENNOSIDES AND DOCUSATE SODIUM 2 TABLET: 50; 8.6 TABLET ORAL at 10:14

## 2022-11-14 RX ADMIN — ENOXAPARIN SODIUM 40 MG: 100 INJECTION SUBCUTANEOUS at 15:26

## 2022-11-14 RX ADMIN — Medication 10 ML: at 20:11

## 2022-11-14 RX ADMIN — BUPROPION HYDROCHLORIDE 150 MG: 150 TABLET, EXTENDED RELEASE ORAL at 15:27

## 2022-11-14 RX ADMIN — URSODIOL 500 MG: 500 TABLET, FILM COATED ORAL at 15:27

## 2022-11-14 RX ADMIN — VENLAFAXINE HYDROCHLORIDE 150 MG: 75 CAPSULE, EXTENDED RELEASE ORAL at 15:27

## 2022-11-14 RX ADMIN — ESTRADIOL 0.5 MG: 1 TABLET ORAL at 15:27

## 2022-11-14 RX ADMIN — ASPIRIN 81 MG CHEWABLE TABLET 81 MG: 81 TABLET CHEWABLE at 10:14

## 2022-11-14 RX ADMIN — METOCLOPRAMIDE 5 MG: 5 TABLET ORAL at 20:10

## 2022-11-14 RX ADMIN — GABAPENTIN 100 MG: 100 CAPSULE ORAL at 15:27

## 2022-11-14 RX ADMIN — LISINOPRIL 20 MG: 20 TABLET ORAL at 15:27

## 2022-11-14 RX ADMIN — SUCRALFATE 1 G: 1 TABLET ORAL at 20:22

## 2022-11-14 RX ADMIN — WHITE PETROLATUM 1 APPLICATION: 1.75 OINTMENT TOPICAL at 20:27

## 2022-11-14 RX ADMIN — WHITE PETROLATUM 1 APPLICATION: 1.75 OINTMENT TOPICAL at 15:28

## 2022-11-14 RX ADMIN — GABAPENTIN 100 MG: 100 CAPSULE ORAL at 20:10

## 2022-11-14 RX ADMIN — FERROUS SULFATE TAB EC 324 MG (65 MG FE EQUIVALENT) 324 MG: 324 (65 FE) TABLET DELAYED RESPONSE at 15:27

## 2022-11-15 LAB
ANION GAP SERPL CALCULATED.3IONS-SCNC: 10 MMOL/L (ref 5–15)
BASOPHILS # BLD AUTO: 0 10*3/MM3 (ref 0–0.2)
BASOPHILS NFR BLD AUTO: 0.2 % (ref 0–1.5)
BUN SERPL-MCNC: 37 MG/DL (ref 8–23)
BUN/CREAT SERPL: 50 (ref 7–25)
CALCIUM SPEC-SCNC: 8.9 MG/DL (ref 8.6–10.5)
CHLORIDE SERPL-SCNC: 99 MMOL/L (ref 98–107)
CO2 SERPL-SCNC: 27 MMOL/L (ref 22–29)
CREAT SERPL-MCNC: 0.74 MG/DL (ref 0.57–1)
DEPRECATED RDW RBC AUTO: 50.3 FL (ref 37–54)
EGFRCR SERPLBLD CKD-EPI 2021: 80.9 ML/MIN/1.73
EOSINOPHIL # BLD AUTO: 0.1 10*3/MM3 (ref 0–0.4)
EOSINOPHIL NFR BLD AUTO: 0.5 % (ref 0.3–6.2)
ERYTHROCYTE [DISTWIDTH] IN BLOOD BY AUTOMATED COUNT: 16 % (ref 12.3–15.4)
GLUCOSE BLDC GLUCOMTR-MCNC: 110 MG/DL (ref 70–105)
GLUCOSE BLDC GLUCOMTR-MCNC: 127 MG/DL (ref 70–105)
GLUCOSE BLDC GLUCOMTR-MCNC: 93 MG/DL (ref 70–105)
GLUCOSE SERPL-MCNC: 106 MG/DL (ref 65–99)
HCT VFR BLD AUTO: 30 % (ref 34–46.6)
HGB BLD-MCNC: 10.1 G/DL (ref 12–15.9)
LYMPHOCYTES # BLD AUTO: 2.7 10*3/MM3 (ref 0.7–3.1)
LYMPHOCYTES NFR BLD AUTO: 24.7 % (ref 19.6–45.3)
MAGNESIUM SERPL-MCNC: 2.1 MG/DL (ref 1.6–2.4)
MCH RBC QN AUTO: 28.8 PG (ref 26.6–33)
MCHC RBC AUTO-ENTMCNC: 33.5 G/DL (ref 31.5–35.7)
MCV RBC AUTO: 85.9 FL (ref 79–97)
MONOCYTES # BLD AUTO: 1 10*3/MM3 (ref 0.1–0.9)
MONOCYTES NFR BLD AUTO: 9.3 % (ref 5–12)
NEUTROPHILS NFR BLD AUTO: 65.3 % (ref 42.7–76)
NEUTROPHILS NFR BLD AUTO: 7.2 10*3/MM3 (ref 1.7–7)
NRBC BLD AUTO-RTO: 0 /100 WBC (ref 0–0.2)
PLATELET # BLD AUTO: 218 10*3/MM3 (ref 140–450)
PMV BLD AUTO: 8.2 FL (ref 6–12)
POTASSIUM SERPL-SCNC: 3.9 MMOL/L (ref 3.5–5.2)
RBC # BLD AUTO: 3.5 10*6/MM3 (ref 3.77–5.28)
SODIUM SERPL-SCNC: 136 MMOL/L (ref 136–145)
WBC NRBC COR # BLD: 11 10*3/MM3 (ref 3.4–10.8)

## 2022-11-15 PROCEDURE — 99233 SBSQ HOSP IP/OBS HIGH 50: CPT | Performed by: NURSE PRACTITIONER

## 2022-11-15 PROCEDURE — 25010000002 CEFTRIAXONE PER 250 MG: Performed by: INTERNAL MEDICINE

## 2022-11-15 PROCEDURE — 83735 ASSAY OF MAGNESIUM: CPT | Performed by: INTERNAL MEDICINE

## 2022-11-15 PROCEDURE — 36415 COLL VENOUS BLD VENIPUNCTURE: CPT | Performed by: INTERNAL MEDICINE

## 2022-11-15 PROCEDURE — 80048 BASIC METABOLIC PNL TOTAL CA: CPT | Performed by: INTERNAL MEDICINE

## 2022-11-15 PROCEDURE — 85025 COMPLETE CBC W/AUTO DIFF WBC: CPT | Performed by: INTERNAL MEDICINE

## 2022-11-15 PROCEDURE — 25010000002 ENOXAPARIN PER 10 MG: Performed by: INTERNAL MEDICINE

## 2022-11-15 PROCEDURE — 82962 GLUCOSE BLOOD TEST: CPT

## 2022-11-15 PROCEDURE — 93010 ELECTROCARDIOGRAM REPORT: CPT | Performed by: INTERNAL MEDICINE

## 2022-11-15 PROCEDURE — 93005 ELECTROCARDIOGRAM TRACING: CPT | Performed by: INTERNAL MEDICINE

## 2022-11-15 RX ADMIN — CEFTRIAXONE 2 G: 2 INJECTION, POWDER, FOR SOLUTION INTRAMUSCULAR; INTRAVENOUS at 12:24

## 2022-11-15 RX ADMIN — GABAPENTIN 100 MG: 100 CAPSULE ORAL at 08:58

## 2022-11-15 RX ADMIN — ENOXAPARIN SODIUM 40 MG: 100 INJECTION SUBCUTANEOUS at 17:37

## 2022-11-15 RX ADMIN — HYDROCHLOROTHIAZIDE 25 MG: 25 TABLET ORAL at 08:59

## 2022-11-15 RX ADMIN — NYSTATIN 1 APPLICATION: 100000 POWDER TOPICAL at 09:00

## 2022-11-15 RX ADMIN — SENNOSIDES AND DOCUSATE SODIUM 2 TABLET: 50; 8.6 TABLET ORAL at 08:58

## 2022-11-15 RX ADMIN — POTASSIUM CHLORIDE 20 MEQ: 1500 TABLET, EXTENDED RELEASE ORAL at 06:12

## 2022-11-15 RX ADMIN — METOCLOPRAMIDE 5 MG: 5 TABLET ORAL at 20:31

## 2022-11-15 RX ADMIN — DICYCLOMINE HYDROCHLORIDE 10 MG: 10 CAPSULE ORAL at 12:25

## 2022-11-15 RX ADMIN — NYSTATIN 1 APPLICATION: 100000 POWDER TOPICAL at 20:32

## 2022-11-15 RX ADMIN — Medication 10 ML: at 08:59

## 2022-11-15 RX ADMIN — DICYCLOMINE HYDROCHLORIDE 10 MG: 10 CAPSULE ORAL at 17:37

## 2022-11-15 RX ADMIN — WHITE PETROLATUM 1 APPLICATION: 1.75 OINTMENT TOPICAL at 09:00

## 2022-11-15 RX ADMIN — CARVEDILOL 12.5 MG: 6.25 TABLET, FILM COATED ORAL at 08:58

## 2022-11-15 RX ADMIN — LEVOTHYROXINE SODIUM 25 MCG: 0.03 TABLET ORAL at 06:12

## 2022-11-15 RX ADMIN — SENNOSIDES AND DOCUSATE SODIUM 2 TABLET: 50; 8.6 TABLET ORAL at 20:30

## 2022-11-15 RX ADMIN — SUCRALFATE 1 G: 1 TABLET ORAL at 08:58

## 2022-11-15 RX ADMIN — MULTIPLE VITAMINS W/ MINERALS TAB 1 TABLET: TAB at 08:58

## 2022-11-15 RX ADMIN — METFORMIN HYDROCHLORIDE 500 MG: 500 TABLET ORAL at 17:38

## 2022-11-15 RX ADMIN — LISINOPRIL 20 MG: 20 TABLET ORAL at 08:57

## 2022-11-15 RX ADMIN — SUCRALFATE 1 G: 1 TABLET ORAL at 17:44

## 2022-11-15 RX ADMIN — URSODIOL 500 MG: 500 TABLET, FILM COATED ORAL at 08:58

## 2022-11-15 RX ADMIN — FERROUS SULFATE TAB EC 324 MG (65 MG FE EQUIVALENT) 324 MG: 324 (65 FE) TABLET DELAYED RESPONSE at 08:57

## 2022-11-15 RX ADMIN — CLONAZEPAM 0.5 MG: 0.5 TABLET ORAL at 20:31

## 2022-11-15 RX ADMIN — SUCRALFATE 1 G: 1 TABLET ORAL at 12:25

## 2022-11-15 RX ADMIN — URSODIOL 500 MG: 500 TABLET, FILM COATED ORAL at 20:31

## 2022-11-15 RX ADMIN — ATORVASTATIN CALCIUM 20 MG: 20 TABLET, FILM COATED ORAL at 20:30

## 2022-11-15 RX ADMIN — CARVEDILOL 12.5 MG: 6.25 TABLET, FILM COATED ORAL at 17:37

## 2022-11-15 RX ADMIN — VENLAFAXINE HYDROCHLORIDE 150 MG: 75 CAPSULE, EXTENDED RELEASE ORAL at 08:57

## 2022-11-15 RX ADMIN — DICYCLOMINE HYDROCHLORIDE 10 MG: 10 CAPSULE ORAL at 06:12

## 2022-11-15 RX ADMIN — GABAPENTIN 100 MG: 100 CAPSULE ORAL at 20:31

## 2022-11-15 RX ADMIN — AMLODIPINE BESYLATE 10 MG: 5 TABLET ORAL at 08:58

## 2022-11-15 RX ADMIN — WHITE PETROLATUM 1 APPLICATION: 1.75 OINTMENT TOPICAL at 20:32

## 2022-11-15 RX ADMIN — Medication 250 MG: at 08:58

## 2022-11-15 RX ADMIN — Medication 10 ML: at 20:30

## 2022-11-15 RX ADMIN — ESTRADIOL 0.5 MG: 1 TABLET ORAL at 08:57

## 2022-11-15 RX ADMIN — ASPIRIN 81 MG CHEWABLE TABLET 81 MG: 81 TABLET CHEWABLE at 08:58

## 2022-11-15 RX ADMIN — GABAPENTIN 100 MG: 100 CAPSULE ORAL at 17:37

## 2022-11-15 RX ADMIN — SUCRALFATE 1 G: 1 TABLET ORAL at 20:31

## 2022-11-15 RX ADMIN — SODIUM CHLORIDE 250 ML: 9 INJECTION, SOLUTION INTRAVENOUS at 01:19

## 2022-11-15 RX ADMIN — PANTOPRAZOLE SODIUM 40 MG: 40 TABLET, DELAYED RELEASE ORAL at 06:12

## 2022-11-16 VITALS
BODY MASS INDEX: 34.96 KG/M2 | HEIGHT: 62 IN | WEIGHT: 190 LBS | SYSTOLIC BLOOD PRESSURE: 113 MMHG | HEART RATE: 75 BPM | OXYGEN SATURATION: 89 % | TEMPERATURE: 98.6 F | RESPIRATION RATE: 19 BRPM | DIASTOLIC BLOOD PRESSURE: 57 MMHG

## 2022-11-16 RX ORDER — ASPIRIN 81 MG/1
81 TABLET, CHEWABLE ORAL DAILY
Qty: 30 TABLET | Refills: 0 | Status: SHIPPED | OUTPATIENT
Start: 2022-11-17

## 2022-11-16 RX ORDER — AMOXICILLIN 250 MG
2 CAPSULE ORAL 2 TIMES DAILY
Qty: 60 TABLET | Refills: 0 | Status: SHIPPED | OUTPATIENT
Start: 2022-11-16

## 2022-11-16 RX ADMIN — SUCRALFATE 1 G: 1 TABLET ORAL at 11:08

## 2022-11-16 RX ADMIN — LISINOPRIL 20 MG: 20 TABLET ORAL at 08:48

## 2022-11-16 RX ADMIN — CARVEDILOL 12.5 MG: 6.25 TABLET, FILM COATED ORAL at 07:41

## 2022-11-16 RX ADMIN — WHITE PETROLATUM 1 APPLICATION: 1.75 OINTMENT TOPICAL at 09:10

## 2022-11-16 RX ADMIN — DICYCLOMINE HYDROCHLORIDE 10 MG: 10 CAPSULE ORAL at 11:08

## 2022-11-16 RX ADMIN — METFORMIN HYDROCHLORIDE 500 MG: 500 TABLET ORAL at 07:41

## 2022-11-16 RX ADMIN — AMLODIPINE BESYLATE 10 MG: 5 TABLET ORAL at 08:48

## 2022-11-16 RX ADMIN — DICYCLOMINE HYDROCHLORIDE 10 MG: 10 CAPSULE ORAL at 00:52

## 2022-11-16 RX ADMIN — VENLAFAXINE HYDROCHLORIDE 150 MG: 75 CAPSULE, EXTENDED RELEASE ORAL at 08:48

## 2022-11-16 RX ADMIN — FERROUS SULFATE TAB EC 324 MG (65 MG FE EQUIVALENT) 324 MG: 324 (65 FE) TABLET DELAYED RESPONSE at 07:41

## 2022-11-16 RX ADMIN — HYDROCHLOROTHIAZIDE 25 MG: 25 TABLET ORAL at 08:48

## 2022-11-16 RX ADMIN — ASPIRIN 81 MG CHEWABLE TABLET 81 MG: 81 TABLET CHEWABLE at 08:48

## 2022-11-16 RX ADMIN — MULTIPLE VITAMINS W/ MINERALS TAB 1 TABLET: TAB at 08:48

## 2022-11-16 RX ADMIN — LEVOTHYROXINE SODIUM 25 MCG: 0.03 TABLET ORAL at 05:31

## 2022-11-16 RX ADMIN — URSODIOL 500 MG: 500 TABLET, FILM COATED ORAL at 08:48

## 2022-11-16 RX ADMIN — PANTOPRAZOLE SODIUM 40 MG: 40 TABLET, DELAYED RELEASE ORAL at 07:41

## 2022-11-16 RX ADMIN — Medication 10 ML: at 09:10

## 2022-11-16 RX ADMIN — Medication 250 MG: at 09:10

## 2022-11-16 RX ADMIN — POTASSIUM CHLORIDE 20 MEQ: 1500 TABLET, EXTENDED RELEASE ORAL at 07:41

## 2022-11-16 RX ADMIN — ESTRADIOL 0.5 MG: 1 TABLET ORAL at 09:10

## 2022-11-16 RX ADMIN — GABAPENTIN 100 MG: 100 CAPSULE ORAL at 08:48

## 2022-11-16 RX ADMIN — NYSTATIN 1 APPLICATION: 100000 POWDER TOPICAL at 09:11

## 2022-11-16 RX ADMIN — SUCRALFATE 1 G: 1 TABLET ORAL at 07:41

## 2022-11-16 RX ADMIN — DICYCLOMINE HYDROCHLORIDE 10 MG: 10 CAPSULE ORAL at 05:31

## 2022-11-16 RX ADMIN — GABAPENTIN 100 MG: 100 CAPSULE ORAL at 15:29

## 2022-11-18 LAB
BACTERIA SPEC AEROBE CULT: NORMAL
BACTERIA SPEC AEROBE CULT: NORMAL

## 2022-12-05 LAB
QT INTERVAL: 342 MS
QT INTERVAL: 413 MS

## 2023-12-16 ENCOUNTER — APPOINTMENT (OUTPATIENT)
Dept: CT IMAGING | Facility: HOSPITAL | Age: 83
End: 2023-12-16
Payer: MEDICARE

## 2023-12-16 ENCOUNTER — APPOINTMENT (OUTPATIENT)
Dept: GENERAL RADIOLOGY | Facility: HOSPITAL | Age: 83
End: 2023-12-16
Payer: MEDICARE

## 2023-12-16 ENCOUNTER — HOSPITAL ENCOUNTER (INPATIENT)
Facility: HOSPITAL | Age: 83
LOS: 1 days | Discharge: NURSING, REHAB OR LONG-TERM CARE | End: 2023-12-18
Attending: EMERGENCY MEDICINE | Admitting: INTERNAL MEDICINE
Payer: MEDICARE

## 2023-12-16 DIAGNOSIS — R06.03 ACUTE RESPIRATORY DISTRESS: ICD-10-CM

## 2023-12-16 DIAGNOSIS — A41.9 SEPSIS, DUE TO UNSPECIFIED ORGANISM, UNSPECIFIED WHETHER ACUTE ORGAN DYSFUNCTION PRESENT: Primary | ICD-10-CM

## 2023-12-16 PROBLEM — J96.01 ACUTE RESPIRATORY FAILURE WITH HYPOXIA: Status: ACTIVE | Noted: 2023-12-16

## 2023-12-16 LAB
ALBUMIN SERPL-MCNC: 3.8 G/DL (ref 3.5–5.2)
ALBUMIN/GLOB SERPL: 1.3 G/DL
ALP SERPL-CCNC: 73 U/L (ref 39–117)
ALT SERPL W P-5'-P-CCNC: 11 U/L (ref 1–33)
ANION GAP SERPL CALCULATED.3IONS-SCNC: 12 MMOL/L (ref 5–15)
ARTERIAL PATENCY WRIST A: POSITIVE
AST SERPL-CCNC: 17 U/L (ref 1–32)
ATMOSPHERIC PRESS: ABNORMAL MM[HG]
B PARAPERT DNA SPEC QL NAA+PROBE: NOT DETECTED
B PERT DNA SPEC QL NAA+PROBE: NOT DETECTED
BASE EXCESS BLDA CALC-SCNC: -1.8 MMOL/L (ref 0–3)
BASOPHILS # BLD AUTO: 0.1 10*3/MM3 (ref 0–0.2)
BASOPHILS NFR BLD AUTO: 0.5 % (ref 0–1.5)
BDY SITE: ABNORMAL
BILIRUB SERPL-MCNC: 0.3 MG/DL (ref 0–1.2)
BILIRUB UR QL STRIP: NEGATIVE
BUN SERPL-MCNC: 17 MG/DL (ref 8–23)
BUN/CREAT SERPL: 16.2 (ref 7–25)
C PNEUM DNA NPH QL NAA+NON-PROBE: NOT DETECTED
CALCIUM SPEC-SCNC: 9 MG/DL (ref 8.6–10.5)
CHLORIDE SERPL-SCNC: 98 MMOL/L (ref 98–107)
CLARITY UR: CLEAR
CO2 BLDA-SCNC: 25.3 MMOL/L (ref 22–29)
CO2 SERPL-SCNC: 25 MMOL/L (ref 22–29)
COLOR UR: ABNORMAL
CREAT SERPL-MCNC: 1.05 MG/DL (ref 0.57–1)
D-LACTATE SERPL-SCNC: 2.6 MMOL/L (ref 0.3–2)
D-LACTATE SERPL-SCNC: 2.7 MMOL/L (ref 0.5–2)
DEPRECATED RDW RBC AUTO: 47.3 FL (ref 37–54)
EGFRCR SERPLBLD CKD-EPI 2021: 52.8 ML/MIN/1.73
EOSINOPHIL # BLD AUTO: 0.1 10*3/MM3 (ref 0–0.4)
EOSINOPHIL NFR BLD AUTO: 1.3 % (ref 0.3–6.2)
ERYTHROCYTE [DISTWIDTH] IN BLOOD BY AUTOMATED COUNT: 15.2 % (ref 12.3–15.4)
FLUAV SUBTYP SPEC NAA+PROBE: NOT DETECTED
FLUBV RNA ISLT QL NAA+PROBE: NOT DETECTED
GEN 5 2HR TROPONIN T REFLEX: 11 NG/L
GLOBULIN UR ELPH-MCNC: 2.9 GM/DL
GLUCOSE BLDC GLUCOMTR-MCNC: 143 MG/DL (ref 74–100)
GLUCOSE SERPL-MCNC: 140 MG/DL (ref 65–99)
GLUCOSE UR STRIP-MCNC: NEGATIVE MG/DL
HADV DNA SPEC NAA+PROBE: NOT DETECTED
HCO3 BLDA-SCNC: 23.9 MMOL/L (ref 21–28)
HCOV 229E RNA SPEC QL NAA+PROBE: NOT DETECTED
HCOV HKU1 RNA SPEC QL NAA+PROBE: NOT DETECTED
HCOV NL63 RNA SPEC QL NAA+PROBE: NOT DETECTED
HCOV OC43 RNA SPEC QL NAA+PROBE: NOT DETECTED
HCT VFR BLD AUTO: 39.5 % (ref 34–46.6)
HEMODILUTION: NO
HGB BLD-MCNC: 13 G/DL (ref 12–15.9)
HGB UR QL STRIP.AUTO: NEGATIVE
HMPV RNA NPH QL NAA+NON-PROBE: NOT DETECTED
HOLD SPECIMEN: NORMAL
HPIV1 RNA ISLT QL NAA+PROBE: NOT DETECTED
HPIV2 RNA SPEC QL NAA+PROBE: NOT DETECTED
HPIV3 RNA NPH QL NAA+PROBE: NOT DETECTED
HPIV4 P GENE NPH QL NAA+PROBE: NOT DETECTED
INHALED O2 CONCENTRATION: 100 %
KETONES UR QL STRIP: ABNORMAL
LEUKOCYTE ESTERASE UR QL STRIP.AUTO: NEGATIVE
LYMPHOCYTES # BLD AUTO: 2.6 10*3/MM3 (ref 0.7–3.1)
LYMPHOCYTES NFR BLD AUTO: 23.3 % (ref 19.6–45.3)
M PNEUMO IGG SER IA-ACNC: NOT DETECTED
MCH RBC QN AUTO: 28 PG (ref 26.6–33)
MCHC RBC AUTO-ENTMCNC: 33 G/DL (ref 31.5–35.7)
MCV RBC AUTO: 84.8 FL (ref 79–97)
MODALITY: ABNORMAL
MONOCYTES # BLD AUTO: 0.9 10*3/MM3 (ref 0.1–0.9)
MONOCYTES NFR BLD AUTO: 7.8 % (ref 5–12)
NEUTROPHILS NFR BLD AUTO: 67.1 % (ref 42.7–76)
NEUTROPHILS NFR BLD AUTO: 7.4 10*3/MM3 (ref 1.7–7)
NITRITE UR QL STRIP: NEGATIVE
NRBC BLD AUTO-RTO: 0.2 /100 WBC (ref 0–0.2)
NT-PROBNP SERPL-MCNC: 470 PG/ML (ref 0–1800)
PCO2 BLDA: 43.5 MM HG (ref 35–48)
PH BLDA: 7.35 PH UNITS (ref 7.35–7.45)
PH UR STRIP.AUTO: 5.5 [PH] (ref 5–8)
PLATELET # BLD AUTO: 250 10*3/MM3 (ref 140–450)
PMV BLD AUTO: 7.8 FL (ref 6–12)
PO2 BLDA: 203.8 MM HG (ref 83–108)
POTASSIUM SERPL-SCNC: 3.8 MMOL/L (ref 3.5–5.2)
PROT SERPL-MCNC: 6.7 G/DL (ref 6–8.5)
PROT UR QL STRIP: ABNORMAL
RBC # BLD AUTO: 4.65 10*6/MM3 (ref 3.77–5.28)
RHINOVIRUS RNA SPEC NAA+PROBE: NOT DETECTED
RSV RNA NPH QL NAA+NON-PROBE: DETECTED
SAO2 % BLDCOA: 99.7 % (ref 94–98)
SARS-COV-2 RNA NPH QL NAA+NON-PROBE: NOT DETECTED
SODIUM SERPL-SCNC: 135 MMOL/L (ref 136–145)
SP GR UR STRIP: 1.01 (ref 1–1.03)
TROPONIN T DELTA: -5 NG/L
TROPONIN T SERPL HS-MCNC: 16 NG/L
UROBILINOGEN UR QL STRIP: ABNORMAL
WBC NRBC COR # BLD AUTO: 11.1 10*3/MM3 (ref 3.4–10.8)
WHOLE BLOOD HOLD COAG: NORMAL

## 2023-12-16 PROCEDURE — 0202U NFCT DS 22 TRGT SARS-COV-2: CPT | Performed by: NURSE PRACTITIONER

## 2023-12-16 PROCEDURE — 80053 COMPREHEN METABOLIC PANEL: CPT | Performed by: NURSE PRACTITIONER

## 2023-12-16 PROCEDURE — 70450 CT HEAD/BRAIN W/O DYE: CPT

## 2023-12-16 PROCEDURE — 83605 ASSAY OF LACTIC ACID: CPT

## 2023-12-16 PROCEDURE — 71045 X-RAY EXAM CHEST 1 VIEW: CPT

## 2023-12-16 PROCEDURE — G0378 HOSPITAL OBSERVATION PER HR: HCPCS

## 2023-12-16 PROCEDURE — P9612 CATHETERIZE FOR URINE SPEC: HCPCS

## 2023-12-16 PROCEDURE — 87040 BLOOD CULTURE FOR BACTERIA: CPT | Performed by: NURSE PRACTITIONER

## 2023-12-16 PROCEDURE — 99285 EMERGENCY DEPT VISIT HI MDM: CPT

## 2023-12-16 PROCEDURE — 82803 BLOOD GASES ANY COMBINATION: CPT

## 2023-12-16 PROCEDURE — 84484 ASSAY OF TROPONIN QUANT: CPT | Performed by: NURSE PRACTITIONER

## 2023-12-16 PROCEDURE — 85025 COMPLETE CBC W/AUTO DIFF WBC: CPT | Performed by: NURSE PRACTITIONER

## 2023-12-16 PROCEDURE — 82948 REAGENT STRIP/BLOOD GLUCOSE: CPT

## 2023-12-16 PROCEDURE — 93005 ELECTROCARDIOGRAM TRACING: CPT | Performed by: NURSE PRACTITIONER

## 2023-12-16 PROCEDURE — 25810000003 SODIUM CHLORIDE 0.9 % SOLUTION: Performed by: NURSE PRACTITIONER

## 2023-12-16 PROCEDURE — 81003 URINALYSIS AUTO W/O SCOPE: CPT | Performed by: NURSE PRACTITIONER

## 2023-12-16 PROCEDURE — 25010000002 VANCOMYCIN 1 G RECONSTITUTED SOLUTION 1 EACH VIAL: Performed by: NURSE PRACTITIONER

## 2023-12-16 PROCEDURE — 94799 UNLISTED PULMONARY SVC/PX: CPT

## 2023-12-16 PROCEDURE — 25010000002 CEFEPIME PER 500 MG: Performed by: NURSE PRACTITIONER

## 2023-12-16 PROCEDURE — 83880 ASSAY OF NATRIURETIC PEPTIDE: CPT | Performed by: NURSE PRACTITIONER

## 2023-12-16 PROCEDURE — 36600 WITHDRAWAL OF ARTERIAL BLOOD: CPT

## 2023-12-16 PROCEDURE — 36415 COLL VENOUS BLD VENIPUNCTURE: CPT

## 2023-12-16 PROCEDURE — 25810000003 SODIUM CHLORIDE 0.9 % SOLUTION 250 ML FLEX CONT: Performed by: NURSE PRACTITIONER

## 2023-12-16 RX ORDER — ASPIRIN 81 MG/1
81 TABLET, CHEWABLE ORAL DAILY
Status: DISCONTINUED | OUTPATIENT
Start: 2023-12-17 | End: 2023-12-18 | Stop reason: HOSPADM

## 2023-12-16 RX ORDER — HYDROCHLOROTHIAZIDE 25 MG/1
25 TABLET ORAL DAILY
Status: DISCONTINUED | OUTPATIENT
Start: 2023-12-17 | End: 2023-12-18 | Stop reason: HOSPADM

## 2023-12-16 RX ORDER — AMOXICILLIN 250 MG
2 CAPSULE ORAL 2 TIMES DAILY
Status: DISCONTINUED | OUTPATIENT
Start: 2023-12-17 | End: 2023-12-18 | Stop reason: HOSPADM

## 2023-12-16 RX ORDER — HEPARIN SODIUM 5000 [USP'U]/ML
5000 INJECTION, SOLUTION INTRAVENOUS; SUBCUTANEOUS EVERY 12 HOURS SCHEDULED
Status: DISCONTINUED | OUTPATIENT
Start: 2023-12-17 | End: 2023-12-18 | Stop reason: HOSPADM

## 2023-12-16 RX ORDER — METOCLOPRAMIDE 10 MG/1
5 TABLET ORAL NIGHTLY
Status: DISCONTINUED | OUTPATIENT
Start: 2023-12-17 | End: 2023-12-18 | Stop reason: HOSPADM

## 2023-12-16 RX ORDER — BUPROPION HYDROCHLORIDE 150 MG/1
150 TABLET ORAL DAILY
Status: DISCONTINUED | OUTPATIENT
Start: 2023-12-17 | End: 2023-12-17

## 2023-12-16 RX ORDER — SUCRALFATE 1 G/1
1 TABLET ORAL
Status: DISCONTINUED | OUTPATIENT
Start: 2023-12-17 | End: 2023-12-18 | Stop reason: HOSPADM

## 2023-12-16 RX ORDER — METHYLPREDNISOLONE SODIUM SUCCINATE 125 MG/2ML
125 INJECTION, POWDER, LYOPHILIZED, FOR SOLUTION INTRAMUSCULAR; INTRAVENOUS ONCE
Qty: 2 ML | Refills: 0 | Status: COMPLETED | OUTPATIENT
Start: 2023-12-16 | End: 2023-12-17

## 2023-12-16 RX ORDER — POTASSIUM CHLORIDE 20 MEQ/1
20 TABLET, EXTENDED RELEASE ORAL
Status: DISCONTINUED | OUTPATIENT
Start: 2023-12-17 | End: 2023-12-18

## 2023-12-16 RX ORDER — AMLODIPINE BESYLATE 5 MG/1
10 TABLET ORAL DAILY
Status: DISCONTINUED | OUTPATIENT
Start: 2023-12-17 | End: 2023-12-18 | Stop reason: HOSPADM

## 2023-12-16 RX ORDER — CLONAZEPAM 0.5 MG/1
0.5 TABLET ORAL NIGHTLY
Qty: 7 TABLET | Refills: 0 | Status: DISCONTINUED | OUTPATIENT
Start: 2023-12-17 | End: 2023-12-18 | Stop reason: HOSPADM

## 2023-12-16 RX ORDER — PREDNISONE 20 MG/1
20 TABLET ORAL DAILY
Status: DISCONTINUED | OUTPATIENT
Start: 2023-12-19 | End: 2023-12-18 | Stop reason: HOSPADM

## 2023-12-16 RX ORDER — AZITHROMYCIN 250 MG/1
250 TABLET, FILM COATED ORAL DAILY
Status: DISCONTINUED | OUTPATIENT
Start: 2023-12-18 | End: 2023-12-18 | Stop reason: HOSPADM

## 2023-12-16 RX ORDER — ONDANSETRON 2 MG/ML
4 INJECTION INTRAMUSCULAR; INTRAVENOUS EVERY 6 HOURS PRN
Status: DISCONTINUED | OUTPATIENT
Start: 2023-12-16 | End: 2023-12-18 | Stop reason: HOSPADM

## 2023-12-16 RX ORDER — AZITHROMYCIN 250 MG/1
500 TABLET, FILM COATED ORAL DAILY
Status: COMPLETED | OUTPATIENT
Start: 2023-12-17 | End: 2023-12-17

## 2023-12-16 RX ORDER — BISACODYL 5 MG/1
5 TABLET, DELAYED RELEASE ORAL DAILY PRN
Status: DISCONTINUED | OUTPATIENT
Start: 2023-12-16 | End: 2023-12-18 | Stop reason: HOSPADM

## 2023-12-16 RX ORDER — SODIUM CHLORIDE 0.9 % (FLUSH) 0.9 %
10 SYRINGE (ML) INJECTION AS NEEDED
Status: DISCONTINUED | OUTPATIENT
Start: 2023-12-16 | End: 2023-12-18 | Stop reason: HOSPADM

## 2023-12-16 RX ORDER — ONDANSETRON 4 MG/1
4 TABLET, FILM COATED ORAL EVERY 6 HOURS PRN
Status: DISCONTINUED | OUTPATIENT
Start: 2023-12-16 | End: 2023-12-18 | Stop reason: HOSPADM

## 2023-12-16 RX ORDER — CHOLECALCIFEROL (VITAMIN D3) 125 MCG
5 CAPSULE ORAL NIGHTLY PRN
Status: DISCONTINUED | OUTPATIENT
Start: 2023-12-16 | End: 2023-12-18 | Stop reason: HOSPADM

## 2023-12-16 RX ORDER — SODIUM CHLORIDE 0.9 % (FLUSH) 0.9 %
10 SYRINGE (ML) INJECTION EVERY 12 HOURS SCHEDULED
Status: DISCONTINUED | OUTPATIENT
Start: 2023-12-16 | End: 2023-12-18 | Stop reason: HOSPADM

## 2023-12-16 RX ORDER — IPRATROPIUM BROMIDE AND ALBUTEROL SULFATE 2.5; .5 MG/3ML; MG/3ML
3 SOLUTION RESPIRATORY (INHALATION)
Status: DISCONTINUED | OUTPATIENT
Start: 2023-12-16 | End: 2023-12-18 | Stop reason: HOSPADM

## 2023-12-16 RX ORDER — URSODIOL 500 MG/1
500 TABLET, FILM COATED ORAL 2 TIMES DAILY
Status: DISCONTINUED | OUTPATIENT
Start: 2023-12-17 | End: 2023-12-18 | Stop reason: HOSPADM

## 2023-12-16 RX ORDER — PANTOPRAZOLE SODIUM 40 MG/1
40 TABLET, DELAYED RELEASE ORAL
Status: DISCONTINUED | OUTPATIENT
Start: 2023-12-17 | End: 2023-12-18 | Stop reason: HOSPADM

## 2023-12-16 RX ORDER — SODIUM CHLORIDE 9 MG/ML
200 INJECTION, SOLUTION INTRAVENOUS CONTINUOUS
Status: DISCONTINUED | OUTPATIENT
Start: 2023-12-16 | End: 2023-12-18 | Stop reason: HOSPADM

## 2023-12-16 RX ORDER — CARVEDILOL 6.25 MG/1
12.5 TABLET ORAL 2 TIMES DAILY WITH MEALS
Status: DISCONTINUED | OUTPATIENT
Start: 2023-12-17 | End: 2023-12-18 | Stop reason: HOSPADM

## 2023-12-16 RX ORDER — ATORVASTATIN CALCIUM 20 MG/1
20 TABLET, FILM COATED ORAL NIGHTLY
Status: DISCONTINUED | OUTPATIENT
Start: 2023-12-17 | End: 2023-12-18 | Stop reason: HOSPADM

## 2023-12-16 RX ORDER — BUDESONIDE 0.5 MG/2ML
0.5 INHALANT ORAL
Status: DISCONTINUED | OUTPATIENT
Start: 2023-12-16 | End: 2023-12-18 | Stop reason: HOSPADM

## 2023-12-16 RX ORDER — ACETAMINOPHEN 325 MG/1
650 TABLET ORAL EVERY 4 HOURS PRN
Status: DISCONTINUED | OUTPATIENT
Start: 2023-12-16 | End: 2023-12-18 | Stop reason: HOSPADM

## 2023-12-16 RX ORDER — ESTRADIOL 1 MG/1
0.5 TABLET ORAL DAILY
Status: DISCONTINUED | OUTPATIENT
Start: 2023-12-17 | End: 2023-12-18 | Stop reason: HOSPADM

## 2023-12-16 RX ORDER — SODIUM CHLORIDE 9 MG/ML
40 INJECTION, SOLUTION INTRAVENOUS AS NEEDED
Status: DISCONTINUED | OUTPATIENT
Start: 2023-12-16 | End: 2023-12-18 | Stop reason: HOSPADM

## 2023-12-16 RX ORDER — VENLAFAXINE HYDROCHLORIDE 75 MG/1
150 CAPSULE, EXTENDED RELEASE ORAL DAILY
Status: DISCONTINUED | OUTPATIENT
Start: 2023-12-17 | End: 2023-12-17

## 2023-12-16 RX ORDER — BISACODYL 10 MG
10 SUPPOSITORY, RECTAL RECTAL DAILY PRN
Status: DISCONTINUED | OUTPATIENT
Start: 2023-12-16 | End: 2023-12-18 | Stop reason: HOSPADM

## 2023-12-16 RX ORDER — GABAPENTIN 100 MG/1
100 CAPSULE ORAL 3 TIMES DAILY
Status: DISCONTINUED | OUTPATIENT
Start: 2023-12-17 | End: 2023-12-18 | Stop reason: HOSPADM

## 2023-12-16 RX ORDER — OXYCODONE HYDROCHLORIDE 5 MG/1
5 TABLET ORAL EVERY 4 HOURS PRN
Status: DISCONTINUED | OUTPATIENT
Start: 2023-12-16 | End: 2023-12-18 | Stop reason: HOSPADM

## 2023-12-16 RX ORDER — LEVOTHYROXINE SODIUM 0.03 MG/1
25 TABLET ORAL
Status: DISCONTINUED | OUTPATIENT
Start: 2023-12-17 | End: 2023-12-18 | Stop reason: HOSPADM

## 2023-12-16 RX ORDER — NITROGLYCERIN 0.4 MG/1
0.4 TABLET SUBLINGUAL
Status: DISCONTINUED | OUTPATIENT
Start: 2023-12-16 | End: 2023-12-18 | Stop reason: HOSPADM

## 2023-12-16 RX ORDER — DIPHENOXYLATE HYDROCHLORIDE AND ATROPINE SULFATE 2.5; .025 MG/1; MG/1
1 TABLET ORAL EVERY 6 HOURS PRN
Status: DISCONTINUED | OUTPATIENT
Start: 2023-12-16 | End: 2023-12-18 | Stop reason: HOSPADM

## 2023-12-16 RX ORDER — DICYCLOMINE HYDROCHLORIDE 10 MG/1
10 CAPSULE ORAL EVERY 6 HOURS SCHEDULED
Status: DISCONTINUED | OUTPATIENT
Start: 2023-12-17 | End: 2023-12-18 | Stop reason: HOSPADM

## 2023-12-16 RX ORDER — PREDNISONE 10 MG/1
10 TABLET ORAL DAILY
Status: DISCONTINUED | OUTPATIENT
Start: 2023-12-21 | End: 2023-12-18 | Stop reason: HOSPADM

## 2023-12-16 RX ORDER — POLYETHYLENE GLYCOL 3350 17 G/17G
17 POWDER, FOR SOLUTION ORAL DAILY PRN
Status: DISCONTINUED | OUTPATIENT
Start: 2023-12-16 | End: 2023-12-18 | Stop reason: HOSPADM

## 2023-12-16 RX ADMIN — CEFEPIME 2000 MG: 2 INJECTION, POWDER, FOR SOLUTION INTRAVENOUS at 19:36

## 2023-12-16 RX ADMIN — SODIUM CHLORIDE 1000 ML: 9 INJECTION, SOLUTION INTRAVENOUS at 19:34

## 2023-12-16 RX ADMIN — VANCOMYCIN HYDROCHLORIDE 1000 MG: 1 INJECTION, POWDER, LYOPHILIZED, FOR SOLUTION INTRAVENOUS at 19:57

## 2023-12-16 RX ADMIN — SODIUM CHLORIDE 200 ML/HR: 9 INJECTION, SOLUTION INTRAVENOUS at 22:20

## 2023-12-17 PROBLEM — E78.5 HLD (HYPERLIPIDEMIA): Status: ACTIVE | Noted: 2023-12-17

## 2023-12-17 PROBLEM — K58.9 IBS (IRRITABLE BOWEL SYNDROME): Status: ACTIVE | Noted: 2023-12-17

## 2023-12-17 PROBLEM — K21.9 GERD WITHOUT ESOPHAGITIS: Status: ACTIVE | Noted: 2023-12-17

## 2023-12-17 PROBLEM — F32.9 REACTIVE DEPRESSION: Status: ACTIVE | Noted: 2023-12-17

## 2023-12-17 PROBLEM — I10 PRIMARY HYPERTENSION: Status: ACTIVE | Noted: 2023-12-17

## 2023-12-17 LAB
ALBUMIN SERPL-MCNC: 3.2 G/DL (ref 3.5–5.2)
ALBUMIN SERPL-MCNC: 3.5 G/DL (ref 3.5–5.2)
ALBUMIN/GLOB SERPL: 1.2 G/DL
ALBUMIN/GLOB SERPL: 1.3 G/DL
ALP SERPL-CCNC: 68 U/L (ref 39–117)
ALP SERPL-CCNC: 84 U/L (ref 39–117)
ALT SERPL W P-5'-P-CCNC: 10 U/L (ref 1–33)
ALT SERPL W P-5'-P-CCNC: 11 U/L (ref 1–33)
ANION GAP SERPL CALCULATED.3IONS-SCNC: 11 MMOL/L (ref 5–15)
ANION GAP SERPL CALCULATED.3IONS-SCNC: 9 MMOL/L (ref 5–15)
AST SERPL-CCNC: 15 U/L (ref 1–32)
AST SERPL-CCNC: 15 U/L (ref 1–32)
BASOPHILS # BLD AUTO: 0 10*3/MM3 (ref 0–0.2)
BASOPHILS # BLD AUTO: 0 10*3/MM3 (ref 0–0.2)
BASOPHILS NFR BLD AUTO: 0.1 % (ref 0–1.5)
BASOPHILS NFR BLD AUTO: 0.4 % (ref 0–1.5)
BILIRUB SERPL-MCNC: 0.2 MG/DL (ref 0–1.2)
BILIRUB SERPL-MCNC: 0.3 MG/DL (ref 0–1.2)
BUN SERPL-MCNC: 16 MG/DL (ref 8–23)
BUN SERPL-MCNC: 18 MG/DL (ref 8–23)
BUN/CREAT SERPL: 25.4 (ref 7–25)
BUN/CREAT SERPL: 32.1 (ref 7–25)
CALCIUM SPEC-SCNC: 8.4 MG/DL (ref 8.6–10.5)
CALCIUM SPEC-SCNC: 8.8 MG/DL (ref 8.6–10.5)
CHLORIDE SERPL-SCNC: 103 MMOL/L (ref 98–107)
CHLORIDE SERPL-SCNC: 105 MMOL/L (ref 98–107)
CO2 SERPL-SCNC: 23 MMOL/L (ref 22–29)
CO2 SERPL-SCNC: 24 MMOL/L (ref 22–29)
CREAT SERPL-MCNC: 0.56 MG/DL (ref 0.57–1)
CREAT SERPL-MCNC: 0.63 MG/DL (ref 0.57–1)
DEPRECATED RDW RBC AUTO: 44.2 FL (ref 37–54)
DEPRECATED RDW RBC AUTO: 44.6 FL (ref 37–54)
EGFRCR SERPLBLD CKD-EPI 2021: 88.1 ML/MIN/1.73
EGFRCR SERPLBLD CKD-EPI 2021: 90.7 ML/MIN/1.73
EOSINOPHIL # BLD AUTO: 0 10*3/MM3 (ref 0–0.4)
EOSINOPHIL # BLD AUTO: 0 10*3/MM3 (ref 0–0.4)
EOSINOPHIL NFR BLD AUTO: 0 % (ref 0.3–6.2)
EOSINOPHIL NFR BLD AUTO: 0.4 % (ref 0.3–6.2)
ERYTHROCYTE [DISTWIDTH] IN BLOOD BY AUTOMATED COUNT: 14.7 % (ref 12.3–15.4)
ERYTHROCYTE [DISTWIDTH] IN BLOOD BY AUTOMATED COUNT: 14.8 % (ref 12.3–15.4)
GLOBULIN UR ELPH-MCNC: 2.6 GM/DL
GLOBULIN UR ELPH-MCNC: 2.7 GM/DL
GLUCOSE SERPL-MCNC: 111 MG/DL (ref 65–99)
GLUCOSE SERPL-MCNC: 185 MG/DL (ref 65–99)
HCT VFR BLD AUTO: 31.8 % (ref 34–46.6)
HCT VFR BLD AUTO: 34.8 % (ref 34–46.6)
HGB BLD-MCNC: 10.7 G/DL (ref 12–15.9)
HGB BLD-MCNC: 11.2 G/DL (ref 12–15.9)
LYMPHOCYTES # BLD AUTO: 1.4 10*3/MM3 (ref 0.7–3.1)
LYMPHOCYTES # BLD AUTO: 2.7 10*3/MM3 (ref 0.7–3.1)
LYMPHOCYTES NFR BLD AUTO: 15 % (ref 19.6–45.3)
LYMPHOCYTES NFR BLD AUTO: 32.1 % (ref 19.6–45.3)
MCH RBC QN AUTO: 28 PG (ref 26.6–33)
MCH RBC QN AUTO: 28.9 PG (ref 26.6–33)
MCHC RBC AUTO-ENTMCNC: 32.3 G/DL (ref 31.5–35.7)
MCHC RBC AUTO-ENTMCNC: 33.6 G/DL (ref 31.5–35.7)
MCV RBC AUTO: 86 FL (ref 79–97)
MCV RBC AUTO: 86.8 FL (ref 79–97)
MONOCYTES # BLD AUTO: 0.5 10*3/MM3 (ref 0.1–0.9)
MONOCYTES # BLD AUTO: 0.6 10*3/MM3 (ref 0.1–0.9)
MONOCYTES NFR BLD AUTO: 5.8 % (ref 5–12)
MONOCYTES NFR BLD AUTO: 7.3 % (ref 5–12)
NEUTROPHILS NFR BLD AUTO: 5 10*3/MM3 (ref 1.7–7)
NEUTROPHILS NFR BLD AUTO: 59.8 % (ref 42.7–76)
NEUTROPHILS NFR BLD AUTO: 7.2 10*3/MM3 (ref 1.7–7)
NEUTROPHILS NFR BLD AUTO: 79.1 % (ref 42.7–76)
NRBC BLD AUTO-RTO: 0 /100 WBC (ref 0–0.2)
NRBC BLD AUTO-RTO: 0 /100 WBC (ref 0–0.2)
PLATELET # BLD AUTO: 178 10*3/MM3 (ref 140–450)
PLATELET # BLD AUTO: 189 10*3/MM3 (ref 140–450)
PMV BLD AUTO: 8.1 FL (ref 6–12)
PMV BLD AUTO: 8.1 FL (ref 6–12)
POTASSIUM SERPL-SCNC: 3.6 MMOL/L (ref 3.5–5.2)
POTASSIUM SERPL-SCNC: 3.9 MMOL/L (ref 3.5–5.2)
PROT SERPL-MCNC: 5.9 G/DL (ref 6–8.5)
PROT SERPL-MCNC: 6.1 G/DL (ref 6–8.5)
QT INTERVAL: 497 MS
QTC INTERVAL: 527 MS
RBC # BLD AUTO: 3.7 10*6/MM3 (ref 3.77–5.28)
RBC # BLD AUTO: 4 10*6/MM3 (ref 3.77–5.28)
SODIUM SERPL-SCNC: 137 MMOL/L (ref 136–145)
SODIUM SERPL-SCNC: 138 MMOL/L (ref 136–145)
WBC NRBC COR # BLD AUTO: 8.4 10*3/MM3 (ref 3.4–10.8)
WBC NRBC COR # BLD AUTO: 9.1 10*3/MM3 (ref 3.4–10.8)

## 2023-12-17 PROCEDURE — 80053 COMPREHEN METABOLIC PANEL: CPT | Performed by: STUDENT IN AN ORGANIZED HEALTH CARE EDUCATION/TRAINING PROGRAM

## 2023-12-17 PROCEDURE — 85025 COMPLETE CBC W/AUTO DIFF WBC: CPT | Performed by: STUDENT IN AN ORGANIZED HEALTH CARE EDUCATION/TRAINING PROGRAM

## 2023-12-17 PROCEDURE — 25010000002 METHYLPREDNISOLONE PER 125 MG: Performed by: STUDENT IN AN ORGANIZED HEALTH CARE EDUCATION/TRAINING PROGRAM

## 2023-12-17 PROCEDURE — 94799 UNLISTED PULMONARY SVC/PX: CPT

## 2023-12-17 PROCEDURE — 94761 N-INVAS EAR/PLS OXIMETRY MLT: CPT

## 2023-12-17 PROCEDURE — 25010000002 HEPARIN (PORCINE) PER 1000 UNITS: Performed by: STUDENT IN AN ORGANIZED HEALTH CARE EDUCATION/TRAINING PROGRAM

## 2023-12-17 PROCEDURE — 99222 1ST HOSP IP/OBS MODERATE 55: CPT

## 2023-12-17 PROCEDURE — 94664 DEMO&/EVAL PT USE INHALER: CPT

## 2023-12-17 PROCEDURE — 63710000001 PREDNISONE PER 5 MG: Performed by: STUDENT IN AN ORGANIZED HEALTH CARE EDUCATION/TRAINING PROGRAM

## 2023-12-17 PROCEDURE — 63710000001 PREDNISONE PER 1 MG: Performed by: STUDENT IN AN ORGANIZED HEALTH CARE EDUCATION/TRAINING PROGRAM

## 2023-12-17 RX ORDER — NITROFURANTOIN 25; 75 MG/1; MG/1
100 CAPSULE ORAL 2 TIMES DAILY
COMMUNITY
Start: 2023-12-10 | End: 2023-12-18 | Stop reason: HOSPADM

## 2023-12-17 RX ORDER — BUPROPION HYDROCHLORIDE 150 MG/1
150 TABLET ORAL DAILY
Status: DISCONTINUED | OUTPATIENT
Start: 2023-12-18 | End: 2023-12-17

## 2023-12-17 RX ORDER — VENLAFAXINE HYDROCHLORIDE 75 MG/1
150 CAPSULE, EXTENDED RELEASE ORAL DAILY
Status: DISCONTINUED | OUTPATIENT
Start: 2023-12-18 | End: 2023-12-18

## 2023-12-17 RX ORDER — DEXLANSOPRAZOLE 60 MG/1
60 CAPSULE, DELAYED RELEASE ORAL DAILY
COMMUNITY

## 2023-12-17 RX ORDER — GUAIFENESIN/DEXTROMETHORPHAN 100-10MG/5
5 SYRUP ORAL EVERY 4 HOURS PRN
Status: DISCONTINUED | OUTPATIENT
Start: 2023-12-17 | End: 2023-12-18 | Stop reason: HOSPADM

## 2023-12-17 RX ORDER — VENLAFAXINE HYDROCHLORIDE 37.5 MG/1
37.5 CAPSULE, EXTENDED RELEASE ORAL DAILY
COMMUNITY

## 2023-12-17 RX ORDER — BUPROPION HYDROCHLORIDE 150 MG/1
150 TABLET ORAL DAILY
Status: DISCONTINUED | OUTPATIENT
Start: 2023-12-18 | End: 2023-12-18

## 2023-12-17 RX ORDER — BUPROPION HYDROCHLORIDE 100 MG/1
100 TABLET, EXTENDED RELEASE ORAL DAILY
Status: DISCONTINUED | OUTPATIENT
Start: 2023-12-18 | End: 2023-12-17

## 2023-12-17 RX ORDER — BUPROPION HYDROCHLORIDE 150 MG/1
300 TABLET ORAL DAILY
Status: DISCONTINUED | OUTPATIENT
Start: 2023-12-18 | End: 2023-12-17

## 2023-12-17 RX ORDER — VENLAFAXINE HYDROCHLORIDE 75 MG/1
75 CAPSULE, EXTENDED RELEASE ORAL DAILY
Status: DISCONTINUED | OUTPATIENT
Start: 2023-12-18 | End: 2023-12-17

## 2023-12-17 RX ORDER — NYSTATIN 100000 U/G
1 OINTMENT TOPICAL EVERY 12 HOURS PRN
COMMUNITY

## 2023-12-17 RX ADMIN — Medication 10 ML: at 08:15

## 2023-12-17 RX ADMIN — PANTOPRAZOLE SODIUM 40 MG: 40 TABLET, DELAYED RELEASE ORAL at 17:09

## 2023-12-17 RX ADMIN — CARVEDILOL 12.5 MG: 6.25 TABLET, FILM COATED ORAL at 08:11

## 2023-12-17 RX ADMIN — DICYCLOMINE HYDROCHLORIDE 10 MG: 10 CAPSULE ORAL at 05:54

## 2023-12-17 RX ADMIN — GABAPENTIN 100 MG: 100 CAPSULE ORAL at 17:10

## 2023-12-17 RX ADMIN — GUAIFENESIN SYRUP AND DEXTROMETHORPHAN 5 ML: 100; 10 SYRUP ORAL at 19:51

## 2023-12-17 RX ADMIN — IPRATROPIUM BROMIDE AND ALBUTEROL SULFATE 3 ML: .5; 3 SOLUTION RESPIRATORY (INHALATION) at 06:40

## 2023-12-17 RX ADMIN — AZITHROMYCIN DIHYDRATE 500 MG: 250 TABLET, FILM COATED ORAL at 08:13

## 2023-12-17 RX ADMIN — VENLAFAXINE HYDROCHLORIDE 150 MG: 75 CAPSULE, EXTENDED RELEASE ORAL at 08:11

## 2023-12-17 RX ADMIN — GABAPENTIN 100 MG: 100 CAPSULE ORAL at 08:11

## 2023-12-17 RX ADMIN — AMLODIPINE BESYLATE 10 MG: 5 TABLET ORAL at 08:11

## 2023-12-17 RX ADMIN — DICYCLOMINE HYDROCHLORIDE 10 MG: 10 CAPSULE ORAL at 17:10

## 2023-12-17 RX ADMIN — DICYCLOMINE HYDROCHLORIDE 10 MG: 10 CAPSULE ORAL at 11:36

## 2023-12-17 RX ADMIN — PREDNISONE 30 MG: 20 TABLET ORAL at 08:13

## 2023-12-17 RX ADMIN — SUCRALFATE 1 G: 1 TABLET ORAL at 17:09

## 2023-12-17 RX ADMIN — URSODIOL 500 MG: 500 TABLET, FILM COATED ORAL at 08:11

## 2023-12-17 RX ADMIN — HYDROCHLOROTHIAZIDE 25 MG: 25 TABLET ORAL at 08:13

## 2023-12-17 RX ADMIN — URSODIOL 500 MG: 500 TABLET, FILM COATED ORAL at 19:53

## 2023-12-17 RX ADMIN — DOCUSATE SODIUM 50 MG AND SENNOSIDES 8.6 MG 2 TABLET: 8.6; 5 TABLET, FILM COATED ORAL at 08:11

## 2023-12-17 RX ADMIN — ESTRADIOL 0.5 MG: 1 TABLET ORAL at 08:13

## 2023-12-17 RX ADMIN — SUCRALFATE 1 G: 1 TABLET ORAL at 11:36

## 2023-12-17 RX ADMIN — METHYLPREDNISOLONE SODIUM SUCCINATE 125 MG: 125 INJECTION, POWDER, FOR SOLUTION INTRAMUSCULAR; INTRAVENOUS at 02:36

## 2023-12-17 RX ADMIN — HEPARIN SODIUM 5000 UNITS: 5000 INJECTION INTRAVENOUS; SUBCUTANEOUS at 08:12

## 2023-12-17 RX ADMIN — Medication 400 MG: at 19:52

## 2023-12-17 RX ADMIN — CARVEDILOL 12.5 MG: 6.25 TABLET, FILM COATED ORAL at 17:09

## 2023-12-17 RX ADMIN — POTASSIUM CHLORIDE 20 MEQ: 1500 TABLET, EXTENDED RELEASE ORAL at 08:11

## 2023-12-17 RX ADMIN — ASPIRIN 81 MG CHEWABLE TABLET 81 MG: 81 TABLET CHEWABLE at 08:12

## 2023-12-17 RX ADMIN — PANTOPRAZOLE SODIUM 40 MG: 40 TABLET, DELAYED RELEASE ORAL at 08:14

## 2023-12-17 RX ADMIN — IPRATROPIUM BROMIDE AND ALBUTEROL SULFATE 3 ML: .5; 3 SOLUTION RESPIRATORY (INHALATION) at 15:30

## 2023-12-17 RX ADMIN — SUCRALFATE 1 G: 1 TABLET ORAL at 08:13

## 2023-12-17 RX ADMIN — METOCLOPRAMIDE 5 MG: 10 TABLET ORAL at 19:52

## 2023-12-17 RX ADMIN — LEVOTHYROXINE SODIUM 25 MCG: 0.03 TABLET ORAL at 05:54

## 2023-12-17 RX ADMIN — Medication 400 MG: at 08:11

## 2023-12-17 RX ADMIN — CLONAZEPAM 0.5 MG: 0.5 TABLET ORAL at 19:52

## 2023-12-17 RX ADMIN — HEPARIN SODIUM 5000 UNITS: 5000 INJECTION INTRAVENOUS; SUBCUTANEOUS at 19:51

## 2023-12-17 RX ADMIN — Medication 10 ML: at 19:54

## 2023-12-17 RX ADMIN — ATORVASTATIN CALCIUM 20 MG: 20 TABLET, FILM COATED ORAL at 19:52

## 2023-12-17 RX ADMIN — DOCUSATE SODIUM 50 MG AND SENNOSIDES 8.6 MG 2 TABLET: 8.6; 5 TABLET, FILM COATED ORAL at 19:52

## 2023-12-17 RX ADMIN — SUCRALFATE 1 G: 1 TABLET ORAL at 19:52

## 2023-12-17 RX ADMIN — GABAPENTIN 100 MG: 100 CAPSULE ORAL at 19:53

## 2023-12-17 RX ADMIN — Medication 400 MG: at 17:10

## 2023-12-17 RX ADMIN — GUAIFENESIN SYRUP AND DEXTROMETHORPHAN 5 ML: 100; 10 SYRUP ORAL at 03:15

## 2023-12-17 RX ADMIN — BUPROPION HYDROCHLORIDE 150 MG: 150 TABLET, EXTENDED RELEASE ORAL at 08:14

## 2023-12-17 RX ADMIN — IPRATROPIUM BROMIDE AND ALBUTEROL SULFATE 3 ML: .5; 3 SOLUTION RESPIRATORY (INHALATION) at 10:41

## 2023-12-17 RX ADMIN — BUDESONIDE INHALATION 0.5 MG: 0.5 SUSPENSION RESPIRATORY (INHALATION) at 06:50

## 2023-12-18 VITALS
BODY MASS INDEX: 18.4 KG/M2 | WEIGHT: 100 LBS | RESPIRATION RATE: 18 BRPM | HEART RATE: 71 BPM | SYSTOLIC BLOOD PRESSURE: 131 MMHG | DIASTOLIC BLOOD PRESSURE: 70 MMHG | HEIGHT: 62 IN | TEMPERATURE: 96.9 F | OXYGEN SATURATION: 95 %

## 2023-12-18 LAB
GLUCOSE BLDC GLUCOMTR-MCNC: 135 MG/DL (ref 70–105)
GLUCOSE BLDC GLUCOMTR-MCNC: 180 MG/DL (ref 70–105)
POTASSIUM SERPL-SCNC: 4.3 MMOL/L (ref 3.5–5.2)

## 2023-12-18 PROCEDURE — 25010000002 HEPARIN (PORCINE) PER 1000 UNITS: Performed by: STUDENT IN AN ORGANIZED HEALTH CARE EDUCATION/TRAINING PROGRAM

## 2023-12-18 PROCEDURE — 97162 PT EVAL MOD COMPLEX 30 MIN: CPT | Performed by: PHYSICAL THERAPIST

## 2023-12-18 PROCEDURE — 94799 UNLISTED PULMONARY SVC/PX: CPT

## 2023-12-18 PROCEDURE — 94761 N-INVAS EAR/PLS OXIMETRY MLT: CPT

## 2023-12-18 PROCEDURE — 63710000001 INSULIN LISPRO (HUMAN) PER 5 UNITS: Performed by: INTERNAL MEDICINE

## 2023-12-18 PROCEDURE — 63710000001 PREDNISONE PER 1 MG: Performed by: STUDENT IN AN ORGANIZED HEALTH CARE EDUCATION/TRAINING PROGRAM

## 2023-12-18 PROCEDURE — 82948 REAGENT STRIP/BLOOD GLUCOSE: CPT

## 2023-12-18 PROCEDURE — 97166 OT EVAL MOD COMPLEX 45 MIN: CPT

## 2023-12-18 PROCEDURE — 63710000001 PREDNISONE PER 5 MG: Performed by: STUDENT IN AN ORGANIZED HEALTH CARE EDUCATION/TRAINING PROGRAM

## 2023-12-18 PROCEDURE — 94664 DEMO&/EVAL PT USE INHALER: CPT

## 2023-12-18 PROCEDURE — 84132 ASSAY OF SERUM POTASSIUM: CPT | Performed by: INTERNAL MEDICINE

## 2023-12-18 RX ORDER — INSULIN LISPRO 100 [IU]/ML
2-7 INJECTION, SOLUTION INTRAVENOUS; SUBCUTANEOUS
Status: DISCONTINUED | OUTPATIENT
Start: 2023-12-18 | End: 2023-12-18 | Stop reason: HOSPADM

## 2023-12-18 RX ORDER — CEFDINIR 300 MG/1
300 CAPSULE ORAL 2 TIMES DAILY
Qty: 14 CAPSULE | Refills: 0 | Status: CANCELLED | OUTPATIENT
Start: 2023-12-18

## 2023-12-18 RX ORDER — POTASSIUM CHLORIDE 20 MEQ/1
40 TABLET, EXTENDED RELEASE ORAL EVERY 4 HOURS
Status: COMPLETED | OUTPATIENT
Start: 2023-12-18 | End: 2023-12-18

## 2023-12-18 RX ORDER — CEFDINIR 300 MG/1
300 CAPSULE ORAL 2 TIMES DAILY
Qty: 14 CAPSULE | Refills: 0 | Status: SHIPPED | OUTPATIENT
Start: 2023-12-18

## 2023-12-18 RX ORDER — IPRATROPIUM BROMIDE AND ALBUTEROL SULFATE 2.5; .5 MG/3ML; MG/3ML
3 SOLUTION RESPIRATORY (INHALATION)
Start: 2023-12-18

## 2023-12-18 RX ORDER — AMOXICILLIN 250 MG
2 CAPSULE ORAL 2 TIMES DAILY
Qty: 30 TABLET | Refills: 0 | Status: CANCELLED | OUTPATIENT
Start: 2023-12-18

## 2023-12-18 RX ORDER — VENLAFAXINE HYDROCHLORIDE 75 MG/1
225 CAPSULE, EXTENDED RELEASE ORAL DAILY
Status: DISCONTINUED | OUTPATIENT
Start: 2023-12-19 | End: 2023-12-18

## 2023-12-18 RX ORDER — NICOTINE POLACRILEX 4 MG
15 LOZENGE BUCCAL
Status: DISCONTINUED | OUTPATIENT
Start: 2023-12-18 | End: 2023-12-18 | Stop reason: HOSPADM

## 2023-12-18 RX ORDER — POTASSIUM CHLORIDE 20 MEQ/1
20 TABLET, EXTENDED RELEASE ORAL
Status: DISCONTINUED | OUTPATIENT
Start: 2023-12-19 | End: 2023-12-18 | Stop reason: HOSPADM

## 2023-12-18 RX ORDER — BUDESONIDE 0.5 MG/2ML
0.5 INHALANT ORAL
Qty: 60 EACH | Refills: 0 | Status: SHIPPED | OUTPATIENT
Start: 2023-12-18

## 2023-12-18 RX ORDER — PREDNISONE 10 MG/1
TABLET ORAL
Qty: 9 TABLET | Refills: 0 | Status: CANCELLED | OUTPATIENT
Start: 2023-12-18 | End: 2023-12-22

## 2023-12-18 RX ORDER — IBUPROFEN 600 MG/1
1 TABLET ORAL
Status: DISCONTINUED | OUTPATIENT
Start: 2023-12-18 | End: 2023-12-18 | Stop reason: HOSPADM

## 2023-12-18 RX ORDER — POLYETHYLENE GLYCOL 3350 17 G/17G
17 POWDER, FOR SOLUTION ORAL DAILY PRN
Qty: 30 EACH | Refills: 0 | Status: SHIPPED | OUTPATIENT
Start: 2023-12-18

## 2023-12-18 RX ORDER — VENLAFAXINE HYDROCHLORIDE 37.5 MG/1
37.5 CAPSULE, EXTENDED RELEASE ORAL ONCE
Status: COMPLETED | OUTPATIENT
Start: 2023-12-18 | End: 2023-12-18

## 2023-12-18 RX ORDER — GUAIFENESIN/DEXTROMETHORPHAN 100-10MG/5
5 SYRUP ORAL EVERY 4 HOURS PRN
Start: 2023-12-18

## 2023-12-18 RX ORDER — DEXTROSE MONOHYDRATE 25 G/50ML
25 INJECTION, SOLUTION INTRAVENOUS
Status: DISCONTINUED | OUTPATIENT
Start: 2023-12-18 | End: 2023-12-18 | Stop reason: HOSPADM

## 2023-12-18 RX ADMIN — Medication 400 MG: at 16:01

## 2023-12-18 RX ADMIN — IPRATROPIUM BROMIDE AND ALBUTEROL SULFATE 3 ML: .5; 3 SOLUTION RESPIRATORY (INHALATION) at 11:33

## 2023-12-18 RX ADMIN — DICYCLOMINE HYDROCHLORIDE 10 MG: 10 CAPSULE ORAL at 11:29

## 2023-12-18 RX ADMIN — Medication 400 MG: at 09:30

## 2023-12-18 RX ADMIN — LEVOTHYROXINE SODIUM 25 MCG: 0.03 TABLET ORAL at 05:52

## 2023-12-18 RX ADMIN — GABAPENTIN 100 MG: 100 CAPSULE ORAL at 09:30

## 2023-12-18 RX ADMIN — INSULIN LISPRO 2 UNITS: 100 INJECTION, SOLUTION INTRAVENOUS; SUBCUTANEOUS at 09:45

## 2023-12-18 RX ADMIN — SUCRALFATE 1 G: 1 TABLET ORAL at 09:29

## 2023-12-18 RX ADMIN — HEPARIN SODIUM 5000 UNITS: 5000 INJECTION INTRAVENOUS; SUBCUTANEOUS at 09:31

## 2023-12-18 RX ADMIN — ASPIRIN 81 MG CHEWABLE TABLET 81 MG: 81 TABLET CHEWABLE at 09:29

## 2023-12-18 RX ADMIN — GABAPENTIN 100 MG: 100 CAPSULE ORAL at 16:01

## 2023-12-18 RX ADMIN — DICYCLOMINE HYDROCHLORIDE 10 MG: 10 CAPSULE ORAL at 00:48

## 2023-12-18 RX ADMIN — PANTOPRAZOLE SODIUM 40 MG: 40 TABLET, DELAYED RELEASE ORAL at 09:29

## 2023-12-18 RX ADMIN — ACETAMINOPHEN 650 MG: 325 TABLET, FILM COATED ORAL at 11:41

## 2023-12-18 RX ADMIN — POTASSIUM CHLORIDE 40 MEQ: 1500 TABLET, EXTENDED RELEASE ORAL at 00:48

## 2023-12-18 RX ADMIN — AMLODIPINE BESYLATE 10 MG: 5 TABLET ORAL at 09:30

## 2023-12-18 RX ADMIN — VENLAFAXINE HYDROCHLORIDE 150 MG: 75 CAPSULE, EXTENDED RELEASE ORAL at 09:30

## 2023-12-18 RX ADMIN — BUDESONIDE INHALATION 0.5 MG: 0.5 SUSPENSION RESPIRATORY (INHALATION) at 06:43

## 2023-12-18 RX ADMIN — ESTRADIOL 0.5 MG: 1 TABLET ORAL at 09:30

## 2023-12-18 RX ADMIN — IPRATROPIUM BROMIDE AND ALBUTEROL SULFATE 3 ML: .5; 3 SOLUTION RESPIRATORY (INHALATION) at 15:26

## 2023-12-18 RX ADMIN — AZITHROMYCIN DIHYDRATE 250 MG: 250 TABLET ORAL at 09:30

## 2023-12-18 RX ADMIN — HYDROCHLOROTHIAZIDE 25 MG: 25 TABLET ORAL at 09:30

## 2023-12-18 RX ADMIN — Medication 10 ML: at 09:39

## 2023-12-18 RX ADMIN — URSODIOL 500 MG: 500 TABLET, FILM COATED ORAL at 09:30

## 2023-12-18 RX ADMIN — VENLAFAXINE HYDROCHLORIDE 37.5 MG: 37.5 CAPSULE, EXTENDED RELEASE ORAL at 11:30

## 2023-12-18 RX ADMIN — DICYCLOMINE HYDROCHLORIDE 10 MG: 10 CAPSULE ORAL at 05:52

## 2023-12-18 RX ADMIN — IPRATROPIUM BROMIDE AND ALBUTEROL SULFATE 3 ML: .5; 3 SOLUTION RESPIRATORY (INHALATION) at 06:43

## 2023-12-18 RX ADMIN — PREDNISONE 30 MG: 20 TABLET ORAL at 09:30

## 2023-12-18 RX ADMIN — CARVEDILOL 12.5 MG: 6.25 TABLET, FILM COATED ORAL at 09:29

## 2023-12-18 RX ADMIN — POTASSIUM CHLORIDE 40 MEQ: 1500 TABLET, EXTENDED RELEASE ORAL at 05:52

## 2023-12-18 RX ADMIN — SUCRALFATE 1 G: 1 TABLET ORAL at 11:29

## 2023-12-21 LAB
BACTERIA SPEC AEROBE CULT: NORMAL
BACTERIA SPEC AEROBE CULT: NORMAL

## 2024-01-01 ENCOUNTER — HOSPITAL ENCOUNTER (INPATIENT)
Facility: HOSPITAL | Age: 84
LOS: 3 days | DRG: 193 | End: 2024-10-31
Attending: EMERGENCY MEDICINE | Admitting: STUDENT IN AN ORGANIZED HEALTH CARE EDUCATION/TRAINING PROGRAM
Payer: OTHER MISCELLANEOUS

## 2024-01-01 ENCOUNTER — APPOINTMENT (OUTPATIENT)
Dept: CT IMAGING | Facility: HOSPITAL | Age: 84
DRG: 193 | End: 2024-01-01
Payer: OTHER MISCELLANEOUS

## 2024-01-01 ENCOUNTER — APPOINTMENT (OUTPATIENT)
Dept: GENERAL RADIOLOGY | Facility: HOSPITAL | Age: 84
DRG: 193 | End: 2024-01-01
Payer: OTHER MISCELLANEOUS

## 2024-01-01 ENCOUNTER — APPOINTMENT (OUTPATIENT)
Dept: CARDIOLOGY | Facility: HOSPITAL | Age: 84
DRG: 193 | End: 2024-01-01
Payer: OTHER MISCELLANEOUS

## 2024-01-01 ENCOUNTER — HOSPITAL ENCOUNTER (INPATIENT)
Facility: HOSPITAL | Age: 84
LOS: 1 days | End: 2024-10-31
Attending: INTERNAL MEDICINE | Admitting: INTERNAL MEDICINE
Payer: OTHER MISCELLANEOUS

## 2024-01-01 VITALS
SYSTOLIC BLOOD PRESSURE: 115 MMHG | DIASTOLIC BLOOD PRESSURE: 73 MMHG | OXYGEN SATURATION: 79 % | HEART RATE: 79 BPM | RESPIRATION RATE: 26 BRPM

## 2024-01-01 VITALS
SYSTOLIC BLOOD PRESSURE: 159 MMHG | HEIGHT: 67 IN | DIASTOLIC BLOOD PRESSURE: 75 MMHG | HEART RATE: 80 BPM | OXYGEN SATURATION: 91 % | WEIGHT: 171.3 LBS | RESPIRATION RATE: 24 BRPM | BODY MASS INDEX: 26.89 KG/M2 | TEMPERATURE: 98.8 F

## 2024-01-01 DIAGNOSIS — R09.02 HYPOXIA: ICD-10-CM

## 2024-01-01 DIAGNOSIS — R06.00 DYSPNEA, UNSPECIFIED TYPE: Primary | ICD-10-CM

## 2024-01-01 DIAGNOSIS — J18.9 PNEUMONIA DUE TO INFECTIOUS ORGANISM, UNSPECIFIED LATERALITY, UNSPECIFIED PART OF LUNG: ICD-10-CM

## 2024-01-01 DIAGNOSIS — I50.9 CONGESTIVE HEART FAILURE, UNSPECIFIED HF CHRONICITY, UNSPECIFIED HEART FAILURE TYPE: ICD-10-CM

## 2024-01-01 LAB
ALBUMIN SERPL-MCNC: 3.6 G/DL (ref 3.5–5.2)
ALBUMIN SERPL-MCNC: 3.7 G/DL (ref 3.5–5.2)
ALBUMIN/GLOB SERPL: 1.1 G/DL
ALBUMIN/GLOB SERPL: 1.2 G/DL
ALP SERPL-CCNC: 70 U/L (ref 39–117)
ALP SERPL-CCNC: 74 U/L (ref 39–117)
ALT SERPL W P-5'-P-CCNC: <5 U/L (ref 1–33)
ALT SERPL W P-5'-P-CCNC: <5 U/L (ref 1–33)
ANION GAP SERPL CALCULATED.3IONS-SCNC: 11.7 MMOL/L (ref 5–15)
ANION GAP SERPL CALCULATED.3IONS-SCNC: 8.8 MMOL/L (ref 5–15)
ANION GAP SERPL CALCULATED.3IONS-SCNC: 8.8 MMOL/L (ref 5–15)
AORTIC DIMENSIONLESS INDEX: 0.52 (DI)
ARTERIAL PATENCY WRIST A: POSITIVE
ARTERIAL PATENCY WRIST A: POSITIVE
AST SERPL-CCNC: 11 U/L (ref 1–32)
AST SERPL-CCNC: 14 U/L (ref 1–32)
ATMOSPHERIC PRESS: ABNORMAL MM[HG]
ATMOSPHERIC PRESS: ABNORMAL MM[HG]
B PARAPERT DNA SPEC QL NAA+PROBE: NOT DETECTED
B PERT DNA SPEC QL NAA+PROBE: NOT DETECTED
BACTERIA BLD CULT: ABNORMAL
BACTERIA SPEC AEROBE CULT: ABNORMAL
BACTERIA SPEC AEROBE CULT: ABNORMAL
BACTERIA UR QL AUTO: ABNORMAL /HPF
BASE EXCESS BLDA CALC-SCNC: 10.4 MMOL/L (ref 0–3)
BASE EXCESS BLDA CALC-SCNC: 8.4 MMOL/L (ref 0–3)
BASOPHILS # BLD AUTO: 0.02 10*3/MM3 (ref 0–0.2)
BASOPHILS # BLD AUTO: 0.05 10*3/MM3 (ref 0–0.2)
BASOPHILS # BLD AUTO: 0.06 10*3/MM3 (ref 0–0.2)
BASOPHILS NFR BLD AUTO: 0.2 % (ref 0–1.5)
BASOPHILS NFR BLD AUTO: 0.3 % (ref 0–1.5)
BASOPHILS NFR BLD AUTO: 0.4 % (ref 0–1.5)
BDY SITE: ABNORMAL
BDY SITE: ABNORMAL
BH CV ECHO MEAS - ACS: 1.4 CM
BH CV ECHO MEAS - AO MAX PG: 26.4 MMHG
BH CV ECHO MEAS - AO MEAN PG: 14 MMHG
BH CV ECHO MEAS - AO V2 MAX: 257 CM/SEC
BH CV ECHO MEAS - AO V2 VTI: 45.3 CM
BH CV ECHO MEAS - AVA(I,D): 1.33 CM2
BH CV ECHO MEAS - EDV(CUBED): 46.7 ML
BH CV ECHO MEAS - EDV(MOD-SP2): 39.1 ML
BH CV ECHO MEAS - EDV(MOD-SP4): 51.9 ML
BH CV ECHO MEAS - EF(MOD-BP): 60.3 %
BH CV ECHO MEAS - EF(MOD-SP2): 59.6 %
BH CV ECHO MEAS - EF(MOD-SP4): 61.5 %
BH CV ECHO MEAS - ESV(CUBED): 15.6 ML
BH CV ECHO MEAS - ESV(MOD-SP2): 15.8 ML
BH CV ECHO MEAS - ESV(MOD-SP4): 20 ML
BH CV ECHO MEAS - FS: 30.6 %
BH CV ECHO MEAS - IVS/LVPW: 1.08 CM
BH CV ECHO MEAS - IVSD: 1.3 CM
BH CV ECHO MEAS - LA DIMENSION: 4 CM
BH CV ECHO MEAS - LAT PEAK E' VEL: 5.9 CM/SEC
BH CV ECHO MEAS - LV DIASTOLIC VOL/BSA (35-75): 27.4 CM2
BH CV ECHO MEAS - LV MASS(C)D: 150.6 GRAMS
BH CV ECHO MEAS - LV MAX PG: 5.6 MMHG
BH CV ECHO MEAS - LV MEAN PG: 3 MMHG
BH CV ECHO MEAS - LV SYSTOLIC VOL/BSA (12-30): 10.6 CM2
BH CV ECHO MEAS - LV V1 MAX: 118 CM/SEC
BH CV ECHO MEAS - LV V1 VTI: 21.3 CM
BH CV ECHO MEAS - LVIDD: 3.6 CM
BH CV ECHO MEAS - LVIDS: 2.5 CM
BH CV ECHO MEAS - LVOT AREA: 2.8 CM2
BH CV ECHO MEAS - LVOT DIAM: 1.9 CM
BH CV ECHO MEAS - LVPWD: 1.2 CM
BH CV ECHO MEAS - MED PEAK E' VEL: 4 CM/SEC
BH CV ECHO MEAS - MR MAX PG: 37.9 MMHG
BH CV ECHO MEAS - MR MAX VEL: 308 CM/SEC
BH CV ECHO MEAS - MV A DUR: 0.12 SEC
BH CV ECHO MEAS - MV A MAX VEL: 172 CM/SEC
BH CV ECHO MEAS - MV DEC SLOPE: 371 CM/SEC2
BH CV ECHO MEAS - MV DEC TIME: 0.25 SEC
BH CV ECHO MEAS - MV E MAX VEL: 103 CM/SEC
BH CV ECHO MEAS - MV E/A: 0.6
BH CV ECHO MEAS - MV MAX PG: 16 MMHG
BH CV ECHO MEAS - MV MEAN PG: 7 MMHG
BH CV ECHO MEAS - MV P1/2T: 94.7 MSEC
BH CV ECHO MEAS - MV V2 VTI: 43.5 CM
BH CV ECHO MEAS - MVA(P1/2T): 2.32 CM2
BH CV ECHO MEAS - MVA(VTI): 1.39 CM2
BH CV ECHO MEAS - PA ACC TIME: 0.11 SEC
BH CV ECHO MEAS - PA V2 MAX: 138 CM/SEC
BH CV ECHO MEAS - RV MAX PG: 5.2 MMHG
BH CV ECHO MEAS - RV V1 MAX: 114 CM/SEC
BH CV ECHO MEAS - RV V1 VTI: 18.4 CM
BH CV ECHO MEAS - RVDD: 3.3 CM
BH CV ECHO MEAS - SV(LVOT): 60.4 ML
BH CV ECHO MEAS - SV(MOD-SP2): 23.3 ML
BH CV ECHO MEAS - SV(MOD-SP4): 31.9 ML
BH CV ECHO MEAS - SVI(LVOT): 31.9 ML/M2
BH CV ECHO MEAS - SVI(MOD-SP2): 12.3 ML/M2
BH CV ECHO MEAS - SVI(MOD-SP4): 16.9 ML/M2
BH CV ECHO MEAS - TAPSE (>1.6): 1.57 CM
BH CV ECHO MEAS - TR MAX PG: 25 MMHG
BH CV ECHO MEAS - TR MAX VEL: 250 CM/SEC
BH CV ECHO MEASUREMENTS AVERAGE E/E' RATIO: 20.81
BH CV XLRA - TDI S': 17.6 CM/SEC
BILIRUB SERPL-MCNC: 0.4 MG/DL (ref 0–1.2)
BILIRUB SERPL-MCNC: 0.7 MG/DL (ref 0–1.2)
BILIRUB UR QL STRIP: ABNORMAL
BOTTLE TYPE: ABNORMAL
BUN SERPL-MCNC: 19 MG/DL (ref 8–23)
BUN SERPL-MCNC: 20 MG/DL (ref 8–23)
BUN SERPL-MCNC: 27 MG/DL (ref 8–23)
BUN/CREAT SERPL: 29 (ref 7–25)
BUN/CREAT SERPL: 33.3 (ref 7–25)
BUN/CREAT SERPL: 52.9 (ref 7–25)
C PNEUM DNA NPH QL NAA+NON-PROBE: NOT DETECTED
CALCIUM SPEC-SCNC: 9.2 MG/DL (ref 8.6–10.5)
CALCIUM SPEC-SCNC: 9.6 MG/DL (ref 8.6–10.5)
CALCIUM SPEC-SCNC: 9.6 MG/DL (ref 8.6–10.5)
CHLORIDE SERPL-SCNC: 91 MMOL/L (ref 98–107)
CHLORIDE SERPL-SCNC: 92 MMOL/L (ref 98–107)
CHLORIDE SERPL-SCNC: 99 MMOL/L (ref 98–107)
CLARITY UR: ABNORMAL
CO2 BLDA-SCNC: 35.6 MMOL/L (ref 22–29)
CO2 BLDA-SCNC: 38.8 MMOL/L (ref 22–29)
CO2 SERPL-SCNC: 33.2 MMOL/L (ref 22–29)
CO2 SERPL-SCNC: 33.2 MMOL/L (ref 22–29)
CO2 SERPL-SCNC: 33.3 MMOL/L (ref 22–29)
COLOR UR: ABNORMAL
CREAT SERPL-MCNC: 0.51 MG/DL (ref 0.57–1)
CREAT SERPL-MCNC: 0.57 MG/DL (ref 0.57–1)
CREAT SERPL-MCNC: 0.69 MG/DL (ref 0.57–1)
D-LACTATE SERPL-SCNC: 1.4 MMOL/L (ref 0.3–2)
DEPRECATED RDW RBC AUTO: 44.3 FL (ref 37–54)
DEPRECATED RDW RBC AUTO: 44.4 FL (ref 37–54)
DEPRECATED RDW RBC AUTO: 44.9 FL (ref 37–54)
EGFRCR SERPLBLD CKD-EPI 2021: 85.7 ML/MIN/1.73
EGFRCR SERPLBLD CKD-EPI 2021: 89.7 ML/MIN/1.73
EGFRCR SERPLBLD CKD-EPI 2021: 92.2 ML/MIN/1.73
EOSINOPHIL # BLD AUTO: 0 10*3/MM3 (ref 0–0.4)
EOSINOPHIL # BLD AUTO: 0.01 10*3/MM3 (ref 0–0.4)
EOSINOPHIL # BLD AUTO: 0.04 10*3/MM3 (ref 0–0.4)
EOSINOPHIL NFR BLD AUTO: 0 % (ref 0.3–6.2)
EOSINOPHIL NFR BLD AUTO: 0.1 % (ref 0.3–6.2)
EOSINOPHIL NFR BLD AUTO: 0.3 % (ref 0.3–6.2)
ERYTHROCYTE [DISTWIDTH] IN BLOOD BY AUTOMATED COUNT: 13.8 % (ref 12.3–15.4)
ERYTHROCYTE [DISTWIDTH] IN BLOOD BY AUTOMATED COUNT: 14 % (ref 12.3–15.4)
ERYTHROCYTE [DISTWIDTH] IN BLOOD BY AUTOMATED COUNT: 14.2 % (ref 12.3–15.4)
FLUAV SUBTYP SPEC NAA+PROBE: NOT DETECTED
FLUBV RNA ISLT QL NAA+PROBE: NOT DETECTED
GLOBULIN UR ELPH-MCNC: 3.1 GM/DL
GLOBULIN UR ELPH-MCNC: 3.2 GM/DL
GLUCOSE BLDC GLUCOMTR-MCNC: 124 MG/DL (ref 70–105)
GLUCOSE BLDC GLUCOMTR-MCNC: 127 MG/DL (ref 70–105)
GLUCOSE BLDC GLUCOMTR-MCNC: 129 MG/DL (ref 70–105)
GLUCOSE BLDC GLUCOMTR-MCNC: 138 MG/DL (ref 70–105)
GLUCOSE BLDC GLUCOMTR-MCNC: 142 MG/DL (ref 70–105)
GLUCOSE BLDC GLUCOMTR-MCNC: 150 MG/DL (ref 70–105)
GLUCOSE BLDC GLUCOMTR-MCNC: 157 MG/DL (ref 70–105)
GLUCOSE BLDC GLUCOMTR-MCNC: 158 MG/DL (ref 70–105)
GLUCOSE SERPL-MCNC: 133 MG/DL (ref 65–99)
GLUCOSE SERPL-MCNC: 143 MG/DL (ref 65–99)
GLUCOSE SERPL-MCNC: 159 MG/DL (ref 65–99)
GLUCOSE UR STRIP-MCNC: NEGATIVE MG/DL
GRAM STN SPEC: ABNORMAL
HADV DNA SPEC NAA+PROBE: NOT DETECTED
HCO3 BLDA-SCNC: 34 MMOL/L (ref 21–28)
HCO3 BLDA-SCNC: 37 MMOL/L (ref 21–28)
HCOV 229E RNA SPEC QL NAA+PROBE: NOT DETECTED
HCOV HKU1 RNA SPEC QL NAA+PROBE: NOT DETECTED
HCOV NL63 RNA SPEC QL NAA+PROBE: NOT DETECTED
HCOV OC43 RNA SPEC QL NAA+PROBE: NOT DETECTED
HCT VFR BLD AUTO: 31.9 % (ref 34–46.6)
HCT VFR BLD AUTO: 35.8 % (ref 34–46.6)
HCT VFR BLD AUTO: 37.9 % (ref 34–46.6)
HEMODILUTION: NO
HEMODILUTION: NO
HGB BLD-MCNC: 10.3 G/DL (ref 12–15.9)
HGB BLD-MCNC: 11.8 G/DL (ref 12–15.9)
HGB BLD-MCNC: 12.1 G/DL (ref 12–15.9)
HGB UR QL STRIP.AUTO: NEGATIVE
HMPV RNA NPH QL NAA+NON-PROBE: NOT DETECTED
HOLD SPECIMEN: NORMAL
HOLD SPECIMEN: NORMAL
HPIV1 RNA ISLT QL NAA+PROBE: NOT DETECTED
HPIV2 RNA SPEC QL NAA+PROBE: NOT DETECTED
HPIV3 RNA NPH QL NAA+PROBE: NOT DETECTED
HPIV4 P GENE NPH QL NAA+PROBE: NOT DETECTED
HYALINE CASTS UR QL AUTO: ABNORMAL /LPF
IMM GRANULOCYTES # BLD AUTO: 0.06 10*3/MM3 (ref 0–0.05)
IMM GRANULOCYTES # BLD AUTO: 0.07 10*3/MM3 (ref 0–0.05)
IMM GRANULOCYTES # BLD AUTO: 0.08 10*3/MM3 (ref 0–0.05)
IMM GRANULOCYTES NFR BLD AUTO: 0.5 % (ref 0–0.5)
INHALED O2 CONCENTRATION: 32 %
INHALED O2 CONCENTRATION: 52 %
ISOLATED FROM: ABNORMAL
ISOLATED FROM: ABNORMAL
KETONES UR QL STRIP: ABNORMAL
L PNEUMO1 AG UR QL IA: NEGATIVE
LEFT ATRIUM VOLUME INDEX: 17.4 ML/M2
LEUKOCYTE ESTERASE UR QL STRIP.AUTO: ABNORMAL
LYMPHOCYTES # BLD AUTO: 0.89 10*3/MM3 (ref 0.7–3.1)
LYMPHOCYTES # BLD AUTO: 1.06 10*3/MM3 (ref 0.7–3.1)
LYMPHOCYTES # BLD AUTO: 2.43 10*3/MM3 (ref 0.7–3.1)
LYMPHOCYTES NFR BLD AUTO: 15.4 % (ref 19.6–45.3)
LYMPHOCYTES NFR BLD AUTO: 5.9 % (ref 19.6–45.3)
LYMPHOCYTES NFR BLD AUTO: 8.4 % (ref 19.6–45.3)
M PNEUMO IGG SER IA-ACNC: NOT DETECTED
MCH RBC QN AUTO: 27.8 PG (ref 26.6–33)
MCH RBC QN AUTO: 28 PG (ref 26.6–33)
MCH RBC QN AUTO: 28.9 PG (ref 26.6–33)
MCHC RBC AUTO-ENTMCNC: 31.9 G/DL (ref 31.5–35.7)
MCHC RBC AUTO-ENTMCNC: 32.3 G/DL (ref 31.5–35.7)
MCHC RBC AUTO-ENTMCNC: 33 G/DL (ref 31.5–35.7)
MCV RBC AUTO: 86.2 FL (ref 79–97)
MCV RBC AUTO: 87.5 FL (ref 79–97)
MCV RBC AUTO: 87.7 FL (ref 79–97)
MODALITY: ABNORMAL
MODALITY: ABNORMAL
MONOCYTES # BLD AUTO: 0.22 10*3/MM3 (ref 0.1–0.9)
MONOCYTES # BLD AUTO: 0.45 10*3/MM3 (ref 0.1–0.9)
MONOCYTES # BLD AUTO: 1.38 10*3/MM3 (ref 0.1–0.9)
MONOCYTES NFR BLD AUTO: 1.5 % (ref 5–12)
MONOCYTES NFR BLD AUTO: 3.6 % (ref 5–12)
MONOCYTES NFR BLD AUTO: 8.7 % (ref 5–12)
MRSA DNA SPEC QL NAA+PROBE: NORMAL
NEUTROPHILS NFR BLD AUTO: 11.05 10*3/MM3 (ref 1.7–7)
NEUTROPHILS NFR BLD AUTO: 11.85 10*3/MM3 (ref 1.7–7)
NEUTROPHILS NFR BLD AUTO: 13.79 10*3/MM3 (ref 1.7–7)
NEUTROPHILS NFR BLD AUTO: 74.9 % (ref 42.7–76)
NEUTROPHILS NFR BLD AUTO: 87.3 % (ref 42.7–76)
NEUTROPHILS NFR BLD AUTO: 91.5 % (ref 42.7–76)
NITRITE UR QL STRIP: POSITIVE
NRBC BLD AUTO-RTO: 0 /100 WBC (ref 0–0.2)
NT-PROBNP SERPL-MCNC: 2654 PG/ML (ref 0–1800)
PCO2 BLDA: 50.7 MM HG (ref 35–48)
PCO2 BLDA: 56.5 MM HG (ref 35–48)
PH BLDA: 7.42 PH UNITS (ref 7.35–7.45)
PH BLDA: 7.43 PH UNITS (ref 7.35–7.45)
PH UR STRIP.AUTO: <=5 [PH] (ref 5–8)
PLATELET # BLD AUTO: 261 10*3/MM3 (ref 140–450)
PLATELET # BLD AUTO: 270 10*3/MM3 (ref 140–450)
PLATELET # BLD AUTO: 302 10*3/MM3 (ref 140–450)
PMV BLD AUTO: 9.3 FL (ref 6–12)
PMV BLD AUTO: 9.3 FL (ref 6–12)
PMV BLD AUTO: 9.5 FL (ref 6–12)
PO2 BLD: 158 MM[HG] (ref 0–500)
PO2 BLD: 91 MM[HG] (ref 0–500)
PO2 BLDA: 47.3 MM HG (ref 83–108)
PO2 BLDA: 50.4 MM HG (ref 83–108)
POTASSIUM SERPL-SCNC: 3.3 MMOL/L (ref 3.5–5.2)
POTASSIUM SERPL-SCNC: 4 MMOL/L (ref 3.5–5.2)
POTASSIUM SERPL-SCNC: 4 MMOL/L (ref 3.5–5.2)
PROCALCITONIN SERPL-MCNC: 0.18 NG/ML (ref 0–0.25)
PROT SERPL-MCNC: 6.8 G/DL (ref 6–8.5)
PROT SERPL-MCNC: 6.8 G/DL (ref 6–8.5)
PROT UR QL STRIP: ABNORMAL
QT INTERVAL: 434 MS
QTC INTERVAL: 462 MS
RBC # BLD AUTO: 3.7 10*6/MM3 (ref 3.77–5.28)
RBC # BLD AUTO: 4.09 10*6/MM3 (ref 3.77–5.28)
RBC # BLD AUTO: 4.32 10*6/MM3 (ref 3.77–5.28)
RBC # UR STRIP: ABNORMAL /HPF
REF LAB TEST METHOD: ABNORMAL
RHINOVIRUS RNA SPEC NAA+PROBE: NOT DETECTED
RSV RNA NPH QL NAA+NON-PROBE: NOT DETECTED
S PNEUM AG SPEC QL LA: NEGATIVE
SAO2 % BLDCOA: 82.5 % (ref 94–98)
SAO2 % BLDCOA: 85.7 % (ref 94–98)
SARS-COV-2 RNA NPH QL NAA+NON-PROBE: NOT DETECTED
SINUS: 3.2 CM
SODIUM SERPL-SCNC: 134 MMOL/L (ref 136–145)
SODIUM SERPL-SCNC: 136 MMOL/L (ref 136–145)
SODIUM SERPL-SCNC: 141 MMOL/L (ref 136–145)
SP GR UR STRIP: 1.02 (ref 1–1.03)
SQUAMOUS #/AREA URNS HPF: ABNORMAL /HPF
STJ: 2.5 CM
TROPONIN T SERPL HS-MCNC: 17 NG/L
UROBILINOGEN UR QL STRIP: ABNORMAL
WBC # UR STRIP: ABNORMAL /HPF
WBC NRBC COR # BLD AUTO: 12.64 10*3/MM3 (ref 3.4–10.8)
WBC NRBC COR # BLD AUTO: 15.06 10*3/MM3 (ref 3.4–10.8)
WBC NRBC COR # BLD AUTO: 15.81 10*3/MM3 (ref 3.4–10.8)
WHOLE BLOOD HOLD COAG: NORMAL
WHOLE BLOOD HOLD SPECIMEN: NORMAL

## 2024-01-01 PROCEDURE — 87186 SC STD MICRODIL/AGAR DIL: CPT | Performed by: PHYSICIAN ASSISTANT

## 2024-01-01 PROCEDURE — 94799 UNLISTED PULMONARY SVC/PX: CPT

## 2024-01-01 PROCEDURE — 71275 CT ANGIOGRAPHY CHEST: CPT

## 2024-01-01 PROCEDURE — 25010000002 MORPHINE PER 10 MG: Performed by: NURSE PRACTITIONER

## 2024-01-01 PROCEDURE — 25010000002 METHYLPREDNISOLONE PER 125 MG: Performed by: NURSE PRACTITIONER

## 2024-01-01 PROCEDURE — 25010000002 LORAZEPAM PER 2 MG: Performed by: NURSE PRACTITIONER

## 2024-01-01 PROCEDURE — 25810000003 SODIUM CHLORIDE 0.9 % SOLUTION 250 ML FLEX CONT: Performed by: INTERNAL MEDICINE

## 2024-01-01 PROCEDURE — 96375 TX/PRO/DX INJ NEW DRUG ADDON: CPT

## 2024-01-01 PROCEDURE — 36415 COLL VENOUS BLD VENIPUNCTURE: CPT

## 2024-01-01 PROCEDURE — 99285 EMERGENCY DEPT VISIT HI MDM: CPT

## 2024-01-01 PROCEDURE — 82803 BLOOD GASES ANY COMBINATION: CPT | Performed by: NURSE PRACTITIONER

## 2024-01-01 PROCEDURE — 80048 BASIC METABOLIC PNL TOTAL CA: CPT | Performed by: INTERNAL MEDICINE

## 2024-01-01 PROCEDURE — 63710000001 INSULIN LISPRO (HUMAN) PER 5 UNITS: Performed by: STUDENT IN AN ORGANIZED HEALTH CARE EDUCATION/TRAINING PROGRAM

## 2024-01-01 PROCEDURE — 87899 AGENT NOS ASSAY W/OPTIC: CPT | Performed by: NURSE PRACTITIONER

## 2024-01-01 PROCEDURE — 94640 AIRWAY INHALATION TREATMENT: CPT

## 2024-01-01 PROCEDURE — 85025 COMPLETE CBC W/AUTO DIFF WBC: CPT | Performed by: NURSE PRACTITIONER

## 2024-01-01 PROCEDURE — 93306 TTE W/DOPPLER COMPLETE: CPT

## 2024-01-01 PROCEDURE — 80053 COMPREHEN METABOLIC PANEL: CPT | Performed by: PHYSICIAN ASSISTANT

## 2024-01-01 PROCEDURE — 36600 WITHDRAWAL OF ARTERIAL BLOOD: CPT | Performed by: PHYSICIAN ASSISTANT

## 2024-01-01 PROCEDURE — 92610 EVALUATE SWALLOWING FUNCTION: CPT

## 2024-01-01 PROCEDURE — 87147 CULTURE TYPE IMMUNOLOGIC: CPT | Performed by: INTERNAL MEDICINE

## 2024-01-01 PROCEDURE — 87449 NOS EACH ORGANISM AG IA: CPT | Performed by: NURSE PRACTITIONER

## 2024-01-01 PROCEDURE — 87154 CUL TYP ID BLD PTHGN 6+ TRGT: CPT | Performed by: PHYSICIAN ASSISTANT

## 2024-01-01 PROCEDURE — 82948 REAGENT STRIP/BLOOD GLUCOSE: CPT

## 2024-01-01 PROCEDURE — 97162 PT EVAL MOD COMPLEX 30 MIN: CPT

## 2024-01-01 PROCEDURE — 85025 COMPLETE CBC W/AUTO DIFF WBC: CPT | Performed by: INTERNAL MEDICINE

## 2024-01-01 PROCEDURE — 25010000002 ENOXAPARIN PER 10 MG: Performed by: NURSE PRACTITIONER

## 2024-01-01 PROCEDURE — 25010000002 VANCOMYCIN 1.75-0.9 GM/500ML-% SOLUTION: Performed by: INTERNAL MEDICINE

## 2024-01-01 PROCEDURE — 25010000002 FUROSEMIDE PER 20 MG: Performed by: INTERNAL MEDICINE

## 2024-01-01 PROCEDURE — 87641 MR-STAPH DNA AMP PROBE: CPT | Performed by: NURSE PRACTITIONER

## 2024-01-01 PROCEDURE — 83880 ASSAY OF NATRIURETIC PEPTIDE: CPT | Performed by: PHYSICIAN ASSISTANT

## 2024-01-01 PROCEDURE — 25010000002 POTASSIUM CHLORIDE 10 MEQ/100ML SOLUTION: Performed by: INTERNAL MEDICINE

## 2024-01-01 PROCEDURE — 25810000003 DEXTROSE 5 % AND SODIUM CHLORIDE 0.9 % 5-0.9 % SOLUTION: Performed by: INTERNAL MEDICINE

## 2024-01-01 PROCEDURE — 87040 BLOOD CULTURE FOR BACTERIA: CPT | Performed by: PHYSICIAN ASSISTANT

## 2024-01-01 PROCEDURE — 74176 CT ABD & PELVIS W/O CONTRAST: CPT

## 2024-01-01 PROCEDURE — 94761 N-INVAS EAR/PLS OXIMETRY MLT: CPT

## 2024-01-01 PROCEDURE — 82803 BLOOD GASES ANY COMBINATION: CPT | Performed by: PHYSICIAN ASSISTANT

## 2024-01-01 PROCEDURE — 25010000002 GLYCOPYRROLATE 0.2 MG/ML SOLUTION: Performed by: NURSE PRACTITIONER

## 2024-01-01 PROCEDURE — 71045 X-RAY EXAM CHEST 1 VIEW: CPT

## 2024-01-01 PROCEDURE — 93306 TTE W/DOPPLER COMPLETE: CPT | Performed by: INTERNAL MEDICINE

## 2024-01-01 PROCEDURE — 94664 DEMO&/EVAL PT USE INHALER: CPT

## 2024-01-01 PROCEDURE — 84145 PROCALCITONIN (PCT): CPT | Performed by: INTERNAL MEDICINE

## 2024-01-01 PROCEDURE — 80053 COMPREHEN METABOLIC PANEL: CPT | Performed by: NURSE PRACTITIONER

## 2024-01-01 PROCEDURE — 82948 REAGENT STRIP/BLOOD GLUCOSE: CPT | Performed by: STUDENT IN AN ORGANIZED HEALTH CARE EDUCATION/TRAINING PROGRAM

## 2024-01-01 PROCEDURE — 83605 ASSAY OF LACTIC ACID: CPT

## 2024-01-01 PROCEDURE — 25010000002 CEFTRIAXONE PER 250 MG: Performed by: INTERNAL MEDICINE

## 2024-01-01 PROCEDURE — 25010000002 METHYLPREDNISOLONE PER 40 MG: Performed by: NURSE PRACTITIONER

## 2024-01-01 PROCEDURE — 85025 COMPLETE CBC W/AUTO DIFF WBC: CPT | Performed by: PHYSICIAN ASSISTANT

## 2024-01-01 PROCEDURE — 87040 BLOOD CULTURE FOR BACTERIA: CPT | Performed by: INTERNAL MEDICINE

## 2024-01-01 PROCEDURE — 93005 ELECTROCARDIOGRAM TRACING: CPT | Performed by: EMERGENCY MEDICINE

## 2024-01-01 PROCEDURE — 25010000002 CEFTRIAXONE PER 250 MG: Performed by: PHYSICIAN ASSISTANT

## 2024-01-01 PROCEDURE — 0202U NFCT DS 22 TRGT SARS-COV-2: CPT | Performed by: PHYSICIAN ASSISTANT

## 2024-01-01 PROCEDURE — 25010000002 VANCOMYCIN 1 G RECONSTITUTED SOLUTION 1 EACH VIAL: Performed by: INTERNAL MEDICINE

## 2024-01-01 PROCEDURE — 94760 N-INVAS EAR/PLS OXIMETRY 1: CPT

## 2024-01-01 PROCEDURE — 25010000002 FUROSEMIDE PER 20 MG: Performed by: PHYSICIAN ASSISTANT

## 2024-01-01 PROCEDURE — 87147 CULTURE TYPE IMMUNOLOGIC: CPT | Performed by: PHYSICIAN ASSISTANT

## 2024-01-01 PROCEDURE — 84484 ASSAY OF TROPONIN QUANT: CPT | Performed by: PHYSICIAN ASSISTANT

## 2024-01-01 PROCEDURE — 36600 WITHDRAWAL OF ARTERIAL BLOOD: CPT | Performed by: NURSE PRACTITIONER

## 2024-01-01 PROCEDURE — 81001 URINALYSIS AUTO W/SCOPE: CPT | Performed by: PHYSICIAN ASSISTANT

## 2024-01-01 PROCEDURE — 99253 IP/OBS CNSLTJ NEW/EST LOW 45: CPT | Performed by: NURSE PRACTITIONER

## 2024-01-01 PROCEDURE — 25510000001 IOPAMIDOL PER 1 ML: Performed by: STUDENT IN AN ORGANIZED HEALTH CARE EDUCATION/TRAINING PROGRAM

## 2024-01-01 PROCEDURE — 25010000002 DIPHENHYDRAMINE PER 50 MG: Performed by: NURSE PRACTITIONER

## 2024-01-01 RX ORDER — BISACODYL 10 MG
10 SUPPOSITORY, RECTAL RECTAL DAILY PRN
Status: DISCONTINUED | OUTPATIENT
Start: 2024-01-01 | End: 2024-01-01 | Stop reason: HOSPADM

## 2024-01-01 RX ORDER — IBUPROFEN 600 MG/1
1 TABLET ORAL
Status: DISCONTINUED | OUTPATIENT
Start: 2024-01-01 | End: 2024-01-01 | Stop reason: HOSPADM

## 2024-01-01 RX ORDER — POTASSIUM CHLORIDE 7.45 MG/ML
10 INJECTION INTRAVENOUS
Status: COMPLETED | OUTPATIENT
Start: 2024-01-01 | End: 2024-01-01

## 2024-01-01 RX ORDER — LORAZEPAM 2 MG/ML
0.5 CONCENTRATE ORAL
Status: DISCONTINUED | OUTPATIENT
Start: 2024-01-01 | End: 2024-01-01 | Stop reason: HOSPADM

## 2024-01-01 RX ORDER — SALIVA STIMULANT COMB. NO.7
GEL (GRAM) MUCOUS MEMBRANE EVERY 6 HOURS PRN
COMMUNITY

## 2024-01-01 RX ORDER — MIRTAZAPINE 7.5 MG/1
7.5 TABLET, FILM COATED ORAL NIGHTLY
COMMUNITY

## 2024-01-01 RX ORDER — DIPHENOXYLATE HYDROCHLORIDE AND ATROPINE SULFATE 2.5; .025 MG/1; MG/1
1 TABLET ORAL EVERY 6 HOURS PRN
Status: DISCONTINUED | OUTPATIENT
Start: 2024-01-01 | End: 2024-01-01 | Stop reason: HOSPADM

## 2024-01-01 RX ORDER — LORAZEPAM 2 MG/ML
1 CONCENTRATE ORAL
Status: DISCONTINUED | OUTPATIENT
Start: 2024-01-01 | End: 2024-01-01 | Stop reason: HOSPADM

## 2024-01-01 RX ORDER — NICOTINE POLACRILEX 4 MG
15 LOZENGE BUCCAL
Status: DISCONTINUED | OUTPATIENT
Start: 2024-01-01 | End: 2024-01-01 | Stop reason: SDUPTHER

## 2024-01-01 RX ORDER — IPRATROPIUM BROMIDE AND ALBUTEROL SULFATE 2.5; .5 MG/3ML; MG/3ML
3 SOLUTION RESPIRATORY (INHALATION)
Status: DISCONTINUED | OUTPATIENT
Start: 2024-01-01 | End: 2024-01-01 | Stop reason: HOSPADM

## 2024-01-01 RX ORDER — LORAZEPAM 2 MG/ML
2 INJECTION INTRAMUSCULAR
Status: DISCONTINUED | OUTPATIENT
Start: 2024-01-01 | End: 2024-01-01 | Stop reason: HOSPADM

## 2024-01-01 RX ORDER — ASPIRIN 81 MG/1
81 TABLET, CHEWABLE ORAL DAILY
Status: DISCONTINUED | OUTPATIENT
Start: 2024-01-01 | End: 2024-01-01 | Stop reason: HOSPADM

## 2024-01-01 RX ORDER — LORAZEPAM 2 MG/ML
0.5 INJECTION INTRAMUSCULAR
Status: DISCONTINUED | OUTPATIENT
Start: 2024-01-01 | End: 2024-01-01 | Stop reason: HOSPADM

## 2024-01-01 RX ORDER — BUPROPION HYDROCHLORIDE 75 MG/1
75 TABLET ORAL DAILY
COMMUNITY

## 2024-01-01 RX ORDER — MENTHOL AND ZINC OXIDE .44; 20.625 G/100G; G/100G
1 OINTMENT TOPICAL EVERY 12 HOURS SCHEDULED
COMMUNITY

## 2024-01-01 RX ORDER — AMLODIPINE BESYLATE 2.5 MG/1
2.5 TABLET ORAL
Status: DISCONTINUED | OUTPATIENT
Start: 2024-01-01 | End: 2024-01-01 | Stop reason: HOSPADM

## 2024-01-01 RX ORDER — NICOTINE POLACRILEX 4 MG
15 LOZENGE BUCCAL
Status: DISCONTINUED | OUTPATIENT
Start: 2024-01-01 | End: 2024-01-01 | Stop reason: HOSPADM

## 2024-01-01 RX ORDER — FUROSEMIDE 10 MG/ML
40 INJECTION INTRAMUSCULAR; INTRAVENOUS ONCE
Status: COMPLETED | OUTPATIENT
Start: 2024-01-01 | End: 2024-01-01

## 2024-01-01 RX ORDER — DEXTROSE MONOHYDRATE AND SODIUM CHLORIDE 5; .9 G/100ML; G/100ML
125 INJECTION, SOLUTION INTRAVENOUS CONTINUOUS
Status: DISCONTINUED | OUTPATIENT
Start: 2024-01-01 | End: 2024-01-01

## 2024-01-01 RX ORDER — MUSCLE RUB CREAM 100; 150 MG/G; MG/G
1 CREAM TOPICAL 2 TIMES DAILY
Status: DISCONTINUED | OUTPATIENT
Start: 2024-01-01 | End: 2024-01-01 | Stop reason: HOSPADM

## 2024-01-01 RX ORDER — BISACODYL 5 MG/1
5 TABLET, DELAYED RELEASE ORAL DAILY PRN
Status: DISCONTINUED | OUTPATIENT
Start: 2024-01-01 | End: 2024-01-01 | Stop reason: HOSPADM

## 2024-01-01 RX ORDER — ENOXAPARIN SODIUM 100 MG/ML
40 INJECTION SUBCUTANEOUS
Status: DISCONTINUED | OUTPATIENT
Start: 2024-01-01 | End: 2024-01-01 | Stop reason: HOSPADM

## 2024-01-01 RX ORDER — LORAZEPAM 2 MG/ML
2 CONCENTRATE ORAL
Status: DISCONTINUED | OUTPATIENT
Start: 2024-01-01 | End: 2024-01-01 | Stop reason: HOSPADM

## 2024-01-01 RX ORDER — GLYCOPYRROLATE 0.2 MG/ML
0.2 INJECTION INTRAMUSCULAR; INTRAVENOUS
Status: DISCONTINUED | OUTPATIENT
Start: 2024-01-01 | End: 2024-01-01 | Stop reason: HOSPADM

## 2024-01-01 RX ORDER — CARVEDILOL 6.25 MG/1
12.5 TABLET ORAL 2 TIMES DAILY WITH MEALS
Status: DISCONTINUED | OUTPATIENT
Start: 2024-01-01 | End: 2024-01-01 | Stop reason: HOSPADM

## 2024-01-01 RX ORDER — METHYLPREDNISOLONE SODIUM SUCCINATE 125 MG/2ML
125 INJECTION, POWDER, LYOPHILIZED, FOR SOLUTION INTRAMUSCULAR; INTRAVENOUS ONCE
Status: COMPLETED | OUTPATIENT
Start: 2024-01-01 | End: 2024-01-01

## 2024-01-01 RX ORDER — AMOXICILLIN 250 MG
2 CAPSULE ORAL 2 TIMES DAILY
Status: DISCONTINUED | OUTPATIENT
Start: 2024-01-01 | End: 2024-01-01 | Stop reason: HOSPADM

## 2024-01-01 RX ORDER — GLYCOPYRROLATE 0.2 MG/ML
0.4 INJECTION INTRAMUSCULAR; INTRAVENOUS
Status: DISCONTINUED | OUTPATIENT
Start: 2024-01-01 | End: 2024-01-01 | Stop reason: HOSPADM

## 2024-01-01 RX ORDER — NITROFURANTOIN 25; 75 MG/1; MG/1
100 CAPSULE ORAL
COMMUNITY

## 2024-01-01 RX ORDER — LORAZEPAM 2 MG/ML
1 INJECTION INTRAMUSCULAR
Status: DISCONTINUED | OUTPATIENT
Start: 2024-01-01 | End: 2024-01-01 | Stop reason: HOSPADM

## 2024-01-01 RX ORDER — ACETAMINOPHEN 325 MG/1
650 TABLET ORAL 2 TIMES DAILY
COMMUNITY

## 2024-01-01 RX ORDER — LORAZEPAM 0.5 MG/1
0.5 TABLET ORAL
Status: DISCONTINUED | OUTPATIENT
Start: 2024-01-01 | End: 2024-01-01 | Stop reason: HOSPADM

## 2024-01-01 RX ORDER — CLONAZEPAM 0.5 MG/1
0.5 TABLET ORAL NIGHTLY
Status: DISCONTINUED | OUTPATIENT
Start: 2024-01-01 | End: 2024-01-01 | Stop reason: HOSPADM

## 2024-01-01 RX ORDER — SODIUM CHLORIDE 0.9 % (FLUSH) 0.9 %
10 SYRINGE (ML) INJECTION AS NEEDED
Status: DISCONTINUED | OUTPATIENT
Start: 2024-01-01 | End: 2024-01-01 | Stop reason: HOSPADM

## 2024-01-01 RX ORDER — GLYCOPYRROLATE 0.2 MG/ML
0.3 INJECTION INTRAMUSCULAR; INTRAVENOUS EVERY 4 HOURS PRN
Status: DISCONTINUED | OUTPATIENT
Start: 2024-01-01 | End: 2024-01-01 | Stop reason: HOSPADM

## 2024-01-01 RX ORDER — DEXTROSE MONOHYDRATE 50 MG/ML
125 INJECTION, SOLUTION INTRAVENOUS CONTINUOUS
Status: DISCONTINUED | OUTPATIENT
Start: 2024-01-01 | End: 2024-01-01

## 2024-01-01 RX ORDER — LORAZEPAM 1 MG/1
1 TABLET ORAL
Status: DISCONTINUED | OUTPATIENT
Start: 2024-01-01 | End: 2024-01-01 | Stop reason: HOSPADM

## 2024-01-01 RX ORDER — NITROGLYCERIN 0.4 MG/1
0.4 TABLET SUBLINGUAL
Status: DISCONTINUED | OUTPATIENT
Start: 2024-01-01 | End: 2024-01-01 | Stop reason: HOSPADM

## 2024-01-01 RX ORDER — ONDANSETRON 4 MG/1
4 TABLET, ORALLY DISINTEGRATING ORAL EVERY 6 HOURS PRN
Status: DISCONTINUED | OUTPATIENT
Start: 2024-01-01 | End: 2024-01-01 | Stop reason: HOSPADM

## 2024-01-01 RX ORDER — IOPAMIDOL 755 MG/ML
100 INJECTION, SOLUTION INTRAVASCULAR
Status: COMPLETED | OUTPATIENT
Start: 2024-01-01 | End: 2024-01-01

## 2024-01-01 RX ORDER — POLYETHYLENE GLYCOL 3350 17 G/17G
17 POWDER, FOR SOLUTION ORAL DAILY PRN
Status: DISCONTINUED | OUTPATIENT
Start: 2024-01-01 | End: 2024-01-01 | Stop reason: HOSPADM

## 2024-01-01 RX ORDER — BUPROPION HYDROCHLORIDE 75 MG/1
75 TABLET ORAL DAILY
Status: DISCONTINUED | OUTPATIENT
Start: 2024-01-01 | End: 2024-01-01 | Stop reason: HOSPADM

## 2024-01-01 RX ORDER — HYDROCHLOROTHIAZIDE 25 MG/1
25 TABLET ORAL DAILY
Status: DISCONTINUED | OUTPATIENT
Start: 2024-01-01 | End: 2024-01-01 | Stop reason: HOSPADM

## 2024-01-01 RX ORDER — ACETAMINOPHEN 325 MG/1
650 TABLET ORAL EVERY 4 HOURS PRN
Status: DISCONTINUED | OUTPATIENT
Start: 2024-01-01 | End: 2024-01-01 | Stop reason: HOSPADM

## 2024-01-01 RX ORDER — DIPHENOXYLATE HYDROCHLORIDE AND ATROPINE SULFATE 2.5; .025 MG/1; MG/1
1 TABLET ORAL
Status: DISCONTINUED | OUTPATIENT
Start: 2024-01-01 | End: 2024-01-01 | Stop reason: HOSPADM

## 2024-01-01 RX ORDER — FUROSEMIDE 10 MG/ML
20 INJECTION INTRAMUSCULAR; INTRAVENOUS EVERY 12 HOURS
Status: DISCONTINUED | OUTPATIENT
Start: 2024-01-01 | End: 2024-01-01 | Stop reason: HOSPADM

## 2024-01-01 RX ORDER — MIRTAZAPINE 7.5 MG/1
7.5 TABLET, FILM COATED ORAL NIGHTLY
Status: DISCONTINUED | OUTPATIENT
Start: 2024-01-01 | End: 2024-01-01 | Stop reason: HOSPADM

## 2024-01-01 RX ORDER — ONDANSETRON 2 MG/ML
4 INJECTION INTRAMUSCULAR; INTRAVENOUS EVERY 6 HOURS PRN
Status: DISCONTINUED | OUTPATIENT
Start: 2024-01-01 | End: 2024-01-01 | Stop reason: HOSPADM

## 2024-01-01 RX ORDER — ACETAMINOPHEN 650 MG/1
650 SUPPOSITORY RECTAL EVERY 4 HOURS PRN
COMMUNITY

## 2024-01-01 RX ORDER — VENLAFAXINE HYDROCHLORIDE 75 MG/1
150 CAPSULE, EXTENDED RELEASE ORAL DAILY
Status: DISCONTINUED | OUTPATIENT
Start: 2024-01-01 | End: 2024-01-01 | Stop reason: HOSPADM

## 2024-01-01 RX ORDER — VANCOMYCIN 1.75 GRAM/500 ML IN 0.9 % SODIUM CHLORIDE INTRAVENOUS
1750 ONCE
Status: COMPLETED | OUTPATIENT
Start: 2024-01-01 | End: 2024-01-01

## 2024-01-01 RX ORDER — AMLODIPINE BESYLATE 5 MG/1
10 TABLET ORAL DAILY
Status: DISCONTINUED | OUTPATIENT
Start: 2024-01-01 | End: 2024-01-01

## 2024-01-01 RX ORDER — GLYCOPYRROLATE 0.2 MG/ML
0.1 INJECTION INTRAMUSCULAR; INTRAVENOUS ONCE
Status: COMPLETED | OUTPATIENT
Start: 2024-01-01 | End: 2024-01-01

## 2024-01-01 RX ORDER — GABAPENTIN 100 MG/1
100 CAPSULE ORAL 3 TIMES DAILY
Status: DISCONTINUED | OUTPATIENT
Start: 2024-01-01 | End: 2024-01-01 | Stop reason: HOSPADM

## 2024-01-01 RX ORDER — PANTOPRAZOLE SODIUM 40 MG/1
40 TABLET, DELAYED RELEASE ORAL
Status: DISCONTINUED | OUTPATIENT
Start: 2024-01-01 | End: 2024-01-01 | Stop reason: HOSPADM

## 2024-01-01 RX ORDER — DICYCLOMINE HYDROCHLORIDE 10 MG/1
10 CAPSULE ORAL EVERY 6 HOURS
Status: DISCONTINUED | OUTPATIENT
Start: 2024-01-01 | End: 2024-01-01

## 2024-01-01 RX ORDER — DIPHENHYDRAMINE HYDROCHLORIDE 50 MG/ML
25 INJECTION INTRAMUSCULAR; INTRAVENOUS ONCE AS NEEDED
Status: COMPLETED | OUTPATIENT
Start: 2024-01-01 | End: 2024-01-01

## 2024-01-01 RX ORDER — SODIUM CHLORIDE 0.9 % (FLUSH) 0.9 %
10 SYRINGE (ML) INJECTION EVERY 12 HOURS SCHEDULED
Status: DISCONTINUED | OUTPATIENT
Start: 2024-01-01 | End: 2024-01-01 | Stop reason: HOSPADM

## 2024-01-01 RX ORDER — MORPHINE SULFATE 20 MG/ML
5 SOLUTION ORAL
Status: DISCONTINUED | OUTPATIENT
Start: 2024-01-01 | End: 2024-01-01 | Stop reason: HOSPADM

## 2024-01-01 RX ORDER — DEXTROSE MONOHYDRATE 25 G/50ML
25 INJECTION, SOLUTION INTRAVENOUS
Status: DISCONTINUED | OUTPATIENT
Start: 2024-01-01 | End: 2024-01-01 | Stop reason: HOSPADM

## 2024-01-01 RX ORDER — ACETAMINOPHEN 650 MG/1
650 SUPPOSITORY RECTAL EVERY 4 HOURS PRN
Status: DISCONTINUED | OUTPATIENT
Start: 2024-01-01 | End: 2024-01-01 | Stop reason: HOSPADM

## 2024-01-01 RX ORDER — LORAZEPAM 1 MG/1
2 TABLET ORAL
Status: DISCONTINUED | OUTPATIENT
Start: 2024-01-01 | End: 2024-01-01 | Stop reason: HOSPADM

## 2024-01-01 RX ORDER — ALBUTEROL SULFATE 0.83 MG/ML
2.5 SOLUTION RESPIRATORY (INHALATION) EVERY 6 HOURS PRN
COMMUNITY

## 2024-01-01 RX ORDER — SODIUM CHLORIDE 9 MG/ML
40 INJECTION, SOLUTION INTRAVENOUS AS NEEDED
Status: DISPENSED | OUTPATIENT
Start: 2024-01-01 | End: 2024-01-01

## 2024-01-01 RX ORDER — METHYLPREDNISOLONE SODIUM SUCCINATE 40 MG/ML
40 INJECTION, POWDER, LYOPHILIZED, FOR SOLUTION INTRAMUSCULAR; INTRAVENOUS EVERY 12 HOURS
Status: DISCONTINUED | OUTPATIENT
Start: 2024-01-01 | End: 2024-01-01 | Stop reason: HOSPADM

## 2024-01-01 RX ORDER — AMOXICILLIN 250 MG
2 CAPSULE ORAL 2 TIMES DAILY PRN
Status: DISCONTINUED | OUTPATIENT
Start: 2024-01-01 | End: 2024-01-01 | Stop reason: HOSPADM

## 2024-01-01 RX ORDER — ACETAMINOPHEN 160 MG/5ML
650 SOLUTION ORAL EVERY 4 HOURS PRN
Status: DISCONTINUED | OUTPATIENT
Start: 2024-01-01 | End: 2024-01-01 | Stop reason: HOSPADM

## 2024-01-01 RX ORDER — BUDESONIDE 0.5 MG/2ML
0.5 INHALANT ORAL
Status: DISCONTINUED | OUTPATIENT
Start: 2024-01-01 | End: 2024-01-01 | Stop reason: HOSPADM

## 2024-01-01 RX ORDER — LEVOTHYROXINE SODIUM 25 UG/1
25 TABLET ORAL
Status: DISCONTINUED | OUTPATIENT
Start: 2024-01-01 | End: 2024-01-01 | Stop reason: HOSPADM

## 2024-01-01 RX ORDER — URSODIOL 500 MG/1
500 TABLET, FILM COATED ORAL 2 TIMES DAILY
Status: DISCONTINUED | OUTPATIENT
Start: 2024-01-01 | End: 2024-01-01 | Stop reason: HOSPADM

## 2024-01-01 RX ORDER — INSULIN LISPRO 100 [IU]/ML
2-7 INJECTION, SOLUTION INTRAVENOUS; SUBCUTANEOUS
Status: DISCONTINUED | OUTPATIENT
Start: 2024-01-01 | End: 2024-01-01 | Stop reason: HOSPADM

## 2024-01-01 RX ORDER — GUAIFENESIN 600 MG/1
1200 TABLET, EXTENDED RELEASE ORAL EVERY 12 HOURS SCHEDULED
Status: DISCONTINUED | OUTPATIENT
Start: 2024-01-01 | End: 2024-01-01 | Stop reason: HOSPADM

## 2024-01-01 RX ADMIN — IPRATROPIUM BROMIDE AND ALBUTEROL SULFATE 3 ML: .5; 3 SOLUTION RESPIRATORY (INHALATION) at 07:19

## 2024-01-01 RX ADMIN — IPRATROPIUM BROMIDE AND ALBUTEROL SULFATE 3 ML: .5; 3 SOLUTION RESPIRATORY (INHALATION) at 02:48

## 2024-01-01 RX ADMIN — IPRATROPIUM BROMIDE AND ALBUTEROL SULFATE 3 ML: .5; 3 SOLUTION RESPIRATORY (INHALATION) at 19:15

## 2024-01-01 RX ADMIN — MORPHINE SULFATE 4 MG: 4 INJECTION, SOLUTION INTRAMUSCULAR; INTRAVENOUS at 15:34

## 2024-01-01 RX ADMIN — IPRATROPIUM BROMIDE AND ALBUTEROL SULFATE 3 ML: .5; 3 SOLUTION RESPIRATORY (INHALATION) at 11:16

## 2024-01-01 RX ADMIN — DEXTROSE AND SODIUM CHLORIDE 125 ML/HR: 5; 900 INJECTION, SOLUTION INTRAVENOUS at 12:43

## 2024-01-01 RX ADMIN — ASPIRIN 81 MG CHEWABLE TABLET 81 MG: 81 TABLET CHEWABLE at 10:16

## 2024-01-01 RX ADMIN — MUSCLE RUB CREAM 1 APPLICATION: 100; 150 CREAM TOPICAL at 21:43

## 2024-01-01 RX ADMIN — POTASSIUM CHLORIDE 10 MEQ: 7.46 INJECTION, SOLUTION INTRAVENOUS at 12:38

## 2024-01-01 RX ADMIN — LORAZEPAM 1 MG: 2 INJECTION INTRAMUSCULAR; INTRAVENOUS at 23:24

## 2024-01-01 RX ADMIN — ANTI-FUNGAL POWDER MICONAZOLE NITRATE TALC FREE: 1.42 POWDER TOPICAL at 21:04

## 2024-01-01 RX ADMIN — LORAZEPAM 2 MG: 2 INJECTION INTRAMUSCULAR; INTRAVENOUS at 10:16

## 2024-01-01 RX ADMIN — ANTI-FUNGAL POWDER MICONAZOLE NITRATE TALC FREE: 1.42 POWDER TOPICAL at 08:33

## 2024-01-01 RX ADMIN — Medication 10 ML: at 23:55

## 2024-01-01 RX ADMIN — IPRATROPIUM BROMIDE AND ALBUTEROL SULFATE 3 ML: .5; 3 SOLUTION RESPIRATORY (INHALATION) at 07:58

## 2024-01-01 RX ADMIN — LORAZEPAM 2 MG: 2 INJECTION INTRAMUSCULAR; INTRAVENOUS at 16:45

## 2024-01-01 RX ADMIN — DIPHENHYDRAMINE HYDROCHLORIDE 25 MG: 50 INJECTION, SOLUTION INTRAMUSCULAR; INTRAVENOUS at 21:38

## 2024-01-01 RX ADMIN — FUROSEMIDE 20 MG: 10 INJECTION, SOLUTION INTRAMUSCULAR; INTRAVENOUS at 08:33

## 2024-01-01 RX ADMIN — LORAZEPAM 2 MG: 2 INJECTION INTRAMUSCULAR; INTRAVENOUS at 14:11

## 2024-01-01 RX ADMIN — IPRATROPIUM BROMIDE AND ALBUTEROL SULFATE 3 ML: .5; 3 SOLUTION RESPIRATORY (INHALATION) at 22:00

## 2024-01-01 RX ADMIN — MORPHINE SULFATE 4 MG: 4 INJECTION, SOLUTION INTRAMUSCULAR; INTRAVENOUS at 08:58

## 2024-01-01 RX ADMIN — GLYCOPYRROLATE 0.1 MG: 0.2 INJECTION, SOLUTION INTRAMUSCULAR; INTRAVENOUS at 21:27

## 2024-01-01 RX ADMIN — LORAZEPAM 2 MG: 2 INJECTION INTRAMUSCULAR; INTRAVENOUS at 11:35

## 2024-01-01 RX ADMIN — ANTI-FUNGAL POWDER MICONAZOLE NITRATE TALC FREE: 1.42 POWDER TOPICAL at 21:43

## 2024-01-01 RX ADMIN — MORPHINE SULFATE 4 MG: 4 INJECTION, SOLUTION INTRAMUSCULAR; INTRAVENOUS at 11:35

## 2024-01-01 RX ADMIN — METHYLPREDNISOLONE SODIUM SUCCINATE 40 MG: 40 INJECTION, POWDER, FOR SOLUTION INTRAMUSCULAR; INTRAVENOUS at 21:39

## 2024-01-01 RX ADMIN — DEXTROSE AND SODIUM CHLORIDE 125 ML/HR: 5; 900 INJECTION, SOLUTION INTRAVENOUS at 16:50

## 2024-01-01 RX ADMIN — GLYCOPYRROLATE 0.4 MG: 0.2 INJECTION INTRAMUSCULAR; INTRAVENOUS at 10:16

## 2024-01-01 RX ADMIN — Medication 10 ML: at 10:39

## 2024-01-01 RX ADMIN — METHYLPREDNISOLONE SODIUM SUCCINATE 40 MG: 40 INJECTION, POWDER, FOR SOLUTION INTRAMUSCULAR; INTRAVENOUS at 10:54

## 2024-01-01 RX ADMIN — MORPHINE SULFATE 4 MG: 4 INJECTION, SOLUTION INTRAMUSCULAR; INTRAVENOUS at 16:45

## 2024-01-01 RX ADMIN — BUDESONIDE 0.5 MG: 0.5 INHALANT RESPIRATORY (INHALATION) at 19:20

## 2024-01-01 RX ADMIN — POTASSIUM CHLORIDE 10 MEQ: 7.46 INJECTION, SOLUTION INTRAVENOUS at 13:47

## 2024-01-01 RX ADMIN — BUDESONIDE 0.5 MG: 0.5 INHALANT RESPIRATORY (INHALATION) at 19:00

## 2024-01-01 RX ADMIN — LORAZEPAM 2 MG: 2 INJECTION INTRAMUSCULAR; INTRAVENOUS at 06:53

## 2024-01-01 RX ADMIN — BUDESONIDE 0.5 MG: 0.5 INHALANT RESPIRATORY (INHALATION) at 08:04

## 2024-01-01 RX ADMIN — GLYCOPYRROLATE 0.4 MG: 0.2 INJECTION INTRAMUSCULAR; INTRAVENOUS at 12:43

## 2024-01-01 RX ADMIN — POTASSIUM CHLORIDE 10 MEQ: 7.46 INJECTION, SOLUTION INTRAVENOUS at 10:35

## 2024-01-01 RX ADMIN — GLYCOPYRROLATE 0.2 MG: 0.2 INJECTION, SOLUTION INTRAMUSCULAR; INTRAVENOUS at 06:54

## 2024-01-01 RX ADMIN — IPRATROPIUM BROMIDE AND ALBUTEROL SULFATE 3 ML: .5; 3 SOLUTION RESPIRATORY (INHALATION) at 02:50

## 2024-01-01 RX ADMIN — MORPHINE SULFATE 4 MG: 4 INJECTION, SOLUTION INTRAMUSCULAR; INTRAVENOUS at 12:43

## 2024-01-01 RX ADMIN — LORAZEPAM 2 MG: 2 INJECTION INTRAMUSCULAR; INTRAVENOUS at 15:34

## 2024-01-01 RX ADMIN — MUSCLE RUB CREAM 1 APPLICATION: 100; 150 CREAM TOPICAL at 08:33

## 2024-01-01 RX ADMIN — MORPHINE SULFATE 4 MG: 4 INJECTION, SOLUTION INTRAMUSCULAR; INTRAVENOUS at 10:16

## 2024-01-01 RX ADMIN — INSULIN LISPRO 2 UNITS: 100 INJECTION, SOLUTION INTRAVENOUS; SUBCUTANEOUS at 10:14

## 2024-01-01 RX ADMIN — FUROSEMIDE 40 MG: 10 INJECTION, SOLUTION INTRAMUSCULAR; INTRAVENOUS at 15:56

## 2024-01-01 RX ADMIN — CARVEDILOL 12.5 MG: 6.25 TABLET, FILM COATED ORAL at 19:10

## 2024-01-01 RX ADMIN — MORPHINE SULFATE 4 MG: 4 INJECTION, SOLUTION INTRAMUSCULAR; INTRAVENOUS at 14:11

## 2024-01-01 RX ADMIN — POTASSIUM CHLORIDE 10 MEQ: 7.46 INJECTION, SOLUTION INTRAVENOUS at 11:34

## 2024-01-01 RX ADMIN — ENOXAPARIN SODIUM 40 MG: 100 INJECTION SUBCUTANEOUS at 16:28

## 2024-01-01 RX ADMIN — LORAZEPAM 2 MG: 2 INJECTION INTRAMUSCULAR; INTRAVENOUS at 12:43

## 2024-01-01 RX ADMIN — GLYCOPYRROLATE 0.4 MG: 0.2 INJECTION INTRAMUSCULAR; INTRAVENOUS at 15:34

## 2024-01-01 RX ADMIN — MORPHINE SULFATE 4 MG: 4 INJECTION, SOLUTION INTRAMUSCULAR; INTRAVENOUS at 21:27

## 2024-01-01 RX ADMIN — IOPAMIDOL 100 ML: 755 INJECTION, SOLUTION INTRAVENOUS at 16:56

## 2024-01-01 RX ADMIN — INSULIN LISPRO 2 UNITS: 100 INJECTION, SOLUTION INTRAVENOUS; SUBCUTANEOUS at 08:33

## 2024-01-01 RX ADMIN — AMLODIPINE BESYLATE 2.5 MG: 2.5 TABLET ORAL at 09:49

## 2024-01-01 RX ADMIN — IPRATROPIUM BROMIDE AND ALBUTEROL SULFATE 3 ML: .5; 3 SOLUTION RESPIRATORY (INHALATION) at 19:05

## 2024-01-01 RX ADMIN — CEFTRIAXONE 2000 MG: 2 INJECTION, POWDER, FOR SOLUTION INTRAMUSCULAR; INTRAVENOUS at 22:08

## 2024-01-01 RX ADMIN — ANTI-FUNGAL POWDER MICONAZOLE NITRATE TALC FREE: 1.42 POWDER TOPICAL at 10:39

## 2024-01-01 RX ADMIN — MORPHINE SULFATE 4 MG: 4 INJECTION, SOLUTION INTRAMUSCULAR; INTRAVENOUS at 23:24

## 2024-01-01 RX ADMIN — Medication 1750 MG: at 19:12

## 2024-01-01 RX ADMIN — BUDESONIDE 0.5 MG: 0.5 INHALANT RESPIRATORY (INHALATION) at 07:19

## 2024-01-01 RX ADMIN — IPRATROPIUM BROMIDE AND ALBUTEROL SULFATE 3 ML: .5; 3 SOLUTION RESPIRATORY (INHALATION) at 15:24

## 2024-01-01 RX ADMIN — MUSCLE RUB CREAM 1 APPLICATION: 100; 150 CREAM TOPICAL at 10:39

## 2024-01-01 RX ADMIN — Medication 10 ML: at 21:05

## 2024-01-01 RX ADMIN — LORAZEPAM 2 MG: 2 INJECTION INTRAMUSCULAR; INTRAVENOUS at 08:58

## 2024-01-01 RX ADMIN — IPRATROPIUM BROMIDE AND ALBUTEROL SULFATE 3 ML: .5; 3 SOLUTION RESPIRATORY (INHALATION) at 16:00

## 2024-01-01 RX ADMIN — CARVEDILOL 12.5 MG: 6.25 TABLET, FILM COATED ORAL at 10:16

## 2024-01-01 RX ADMIN — FUROSEMIDE 20 MG: 10 INJECTION, SOLUTION INTRAMUSCULAR; INTRAVENOUS at 21:38

## 2024-01-01 RX ADMIN — BUPROPION HYDROCHLORIDE 75 MG: 75 TABLET, FILM COATED ORAL at 10:17

## 2024-01-01 RX ADMIN — METHYLPREDNISOLONE SODIUM SUCCINATE 125 MG: 125 INJECTION, POWDER, FOR SOLUTION INTRAMUSCULAR; INTRAVENOUS at 21:03

## 2024-01-01 RX ADMIN — Medication 10 ML: at 08:33

## 2024-01-01 RX ADMIN — VENLAFAXINE HYDROCHLORIDE 150 MG: 75 CAPSULE, EXTENDED RELEASE ORAL at 10:18

## 2024-01-01 RX ADMIN — DEXTROSE AND SODIUM CHLORIDE 125 ML/HR: 5; 900 INJECTION, SOLUTION INTRAVENOUS at 05:22

## 2024-01-01 RX ADMIN — LORAZEPAM 1 MG: 2 INJECTION INTRAMUSCULAR; INTRAVENOUS at 21:27

## 2024-01-01 RX ADMIN — FUROSEMIDE 20 MG: 10 INJECTION, SOLUTION INTRAMUSCULAR; INTRAVENOUS at 10:35

## 2024-01-01 RX ADMIN — IPRATROPIUM BROMIDE AND ALBUTEROL SULFATE 3 ML: .5; 3 SOLUTION RESPIRATORY (INHALATION) at 23:05

## 2024-01-01 RX ADMIN — SODIUM CHLORIDE 1000 MG: 9 INJECTION, SOLUTION INTRAVENOUS at 08:33

## 2024-01-01 RX ADMIN — METHYLPREDNISOLONE SODIUM SUCCINATE 40 MG: 40 INJECTION, POWDER, FOR SOLUTION INTRAMUSCULAR; INTRAVENOUS at 08:33

## 2024-01-01 RX ADMIN — URSODIOL 500 MG: 500 TABLET, FILM COATED ORAL at 10:17

## 2024-01-01 RX ADMIN — ENOXAPARIN SODIUM 40 MG: 100 INJECTION SUBCUTANEOUS at 16:51

## 2024-01-01 RX ADMIN — MORPHINE SULFATE 4 MG: 4 INJECTION, SOLUTION INTRAMUSCULAR; INTRAVENOUS at 06:54

## 2024-01-01 RX ADMIN — Medication 10 ML: at 21:44

## 2024-01-01 RX ADMIN — GUAIFENESIN 1200 MG: 600 TABLET, EXTENDED RELEASE ORAL at 10:17

## 2024-01-01 RX ADMIN — CEFTRIAXONE 2000 MG: 2 INJECTION, POWDER, FOR SOLUTION INTRAMUSCULAR; INTRAVENOUS at 21:04

## 2024-01-01 RX ADMIN — IPRATROPIUM BROMIDE AND ALBUTEROL SULFATE 3 ML: .5; 3 SOLUTION RESPIRATORY (INHALATION) at 11:09

## 2024-10-28 PROBLEM — R06.02 SHORTNESS OF BREATH: Status: ACTIVE | Noted: 2024-01-01

## 2024-10-28 NOTE — Clinical Note
Level of Care: Telemetry [5]   Admitting Physician: NATHAN YOUNGER [[id number]]   Attending Physician: NATHAN YOUNGER [[id number]]

## 2024-10-28 NOTE — H&P
[facility] Medicine Services  History & Physical    Patient Name: Nadya Hurtado  : 1940  MRN: [id number]  Primary Care Physician:  Maximino Freedman MD  Date of admission: 10/28/2024  Date and Time of Service: 10/28/2024 at 3:55 PM EDT     Subjective      Chief Complaint: Shortness of breath.    History of Present Illness: Nadya Hurtado is a 83 y.o. female with a CMH of dementia, CHF, HTN, HLD, GERD who presented to [facility] on 10/28/2024 with shortness of breath.  Patient is a resident at [facility] patient developed acute onset of shortness of breath, requiring oxygen supplementation.  Patient does have some altered mental status/disorientation, however it is at patient's baseline.  Patient is typically on room.    While in the emergency department patient is requiring 5 L of oxygen.  Chest x-ray was negative, CT PE protocol pending, labs were remarkable for: Troponin 17, BNP 2654, sodium 134, chloride 92, CO2 33.2.  WBC 15.81, glucose.  Urine positive for nitrites, however 0-2 WBCs    Patient given cefepime presumptively due to leukocytosis    Review of Systems   Unable to perform ROS: Dementia       Personal History     Past Medical History:   Diagnosis Date    Esophageal spasm     Gastritis     Hyperlipidemia     Hypertension     IBS (irritable bowel syndrome)        No past surgical history on file.    Family History: family history is not on file. Otherwise pertinent FHx was reviewed and not pertinent to current issue.    Social History:  reports that she has never smoked. She has never been exposed to tobacco smoke. She has never used smokeless tobacco. She reports that she does not currently use alcohol. She reports that she does not use drugs.    Home Medications:  Prior to Admission Medications       Prescriptions Last Dose Informant Patient Reported? Taking?    acetaminophen (TYLENOL) 325 MG tablet   Yes Yes    Take 2 tablets by mouth 2 (Two) Times a Day.     amLODIPine (NORVASC) 10 MG tablet   Yes Yes    Take 1 tablet by mouth Daily.    aspirin 81 MG chewable tablet   No Yes    Chew 1 tablet Daily.    buPROPion (WELLBUTRIN) 75 MG tablet   Yes Yes    Take 1 tablet by mouth Daily.    carvedilol (COREG) 12.5 MG tablet   Yes Yes    Take 1 tablet by mouth 2 (Two) Times a Day With Meals.    clonazePAM (KlonoPIN) 0.5 MG tablet   Yes Yes    Take 1 tablet by mouth Every Night.    gabapentin (NEURONTIN) 100 MG capsule   Yes Yes    Take 1 capsule by mouth 3 (Three) Times a Day.    hydroCHLOROthiazide (HYDRODIURIL) 25 MG tablet   Yes Yes    Take 1 tablet by mouth Daily.    icosapent ethyl (VASCEPA) 1 g capsule capsule   Yes Yes    Take 1 g by mouth 2 (Two) Times a Day With Meals.    levothyroxine (SYNTHROID, LEVOTHROID) 25 MCG tablet   Yes Yes    Take 1 tablet by mouth Every Morning.    Menthol, Topical Analgesic, 4 % gel   Yes Yes    Apply  topically 2 (Two) Times a Day. Bilateral shoulders, back neck for mild pain    menthol-zinc oxide (CALMOSEPTINE) 0.44-20.625 % ointment ointment   Yes Yes    Apply 1 Application topically to the appropriate area as directed Every 12 (Twelve) Hours. Bilateral buttocks topically every shift for tx    metFORMIN (GLUCOPHAGE) 500 MG tablet   Yes Yes    Take 1 tablet by mouth 2 (Two) Times a Day With Meals.    miconazole (MICOTIN) 2 % powder   Yes Yes    Apply  topically to the appropriate area as directed 2 (Two) Times a Day.    mineral oil-hydrophilic petrolatum (AQUAPHOR) ointment   Yes Yes    Apply 1 Application topically to the appropriate area as directed 2 (Two) Times a Day. Bilateral lower extremities    mirtazapine (REMERON) 7.5 MG tablet   Yes Yes    Take 1 tablet by mouth Every Night.    nitrofurantoin, macrocrystal-monohydrate, (MACROBID) 100 MG capsule   Yes Yes    Take 1 capsule by mouth every night at bedtime.    nystatin (MYCOSTATIN) 856355 UNIT/GM ointment   Yes Yes    Apply 1 Application topically to the appropriate area as directed  Every 12 (Twelve) Hours As Needed.    omeprazole (priLOSEC) 20 MG capsule   Yes Yes    Take 1 capsule by mouth Daily.    ursodiol (ACTIGALL) 500 MG tablet   Yes Yes    Take 1 tablet by mouth 2 (Two) Times a Day.    venlafaxine XR (EFFEXOR-XR) 150 MG 24 hr capsule   Yes Yes    Take 1 capsule by mouth Daily.    acetaminophen (TYLENOL) 325 MG tablet   Yes No    Take 2 tablets by mouth Every 4 (Four) Hours As Needed for Mild Pain.    acetaminophen (TYLENOL) 650 MG suppository   Yes No    Insert 1 suppository into the rectum Every 4 (Four) Hours As Needed.    albuterol (PROVENTIL) (2.5 MG/3ML) 0.083% nebulizer solution   Yes No    Take 2.5 mg by nebulization Every 6 (Six) Hours As Needed for Shortness of Air.    bisacodyl (DULCOLAX) 10 MG suppository   Yes No    Insert 1 suppository into the rectum As Needed for Constipation. If no results from milk of magnesia.   If no results in 8 hours, give enema.    dextrose (GLUTOSE) 40 % gel   Yes No    Take 15 g by mouth Every 15 (Fifteen) Minutes As Needed for Low Blood Sugar. Blood glucose < 60    dicyclomine (BENTYL) 10 MG capsule   Yes No    Take 1 capsule by mouth Every 6 (Six) Hours.    diphenoxylate-atropine (LOMOTIL) 2.5-0.025 MG per tablet   Yes No    Take 1 tablet by mouth Every 6 (Six) Hours As Needed for Diarrhea.    dry mouth gel (BIOTENE ORALBALANCE) gel   Yes No    Apply  to the mouth or throat Every 6 (Six) Hours As Needed for Dry Mouth. Give 15 cc by mouth every 6 hours as needed for dry mouth rinse 30 seconds then spit out    Glucagon, rDNA, (Glucagon Emergency) 1 MG kit   Yes No    Inject 1 mg as directed As Needed (Blood glucose <60).    guaiFENesin-dextromethorphan (ROBITUSSIN DM) 100-10 MG/5ML syrup   No No    Take 5 mL by mouth Every 4 (Four) Hours As Needed for Cough.    magnesium hydroxide (MILK OF MAGNESIA) 400 MG/5ML suspension   Yes No    Take 30 mL by mouth As Needed for Constipation. If no results in 8 hours, give bisacodyl suppository     "nitroglycerin (NITROSTAT) 0.4 MG SL tablet   Yes No    Place 1 tablet under the tongue Every 5 (Five) Minutes As Needed for Chest Pain. Take no more than 3 doses in 15 minutes.    ondansetron (ZOFRAN) 4 MG tablet   Yes No    Take 1 tablet by mouth Every 6 (Six) Hours As Needed for Nausea or Vomiting.    Sodium Phosphates (fleet enema) 7-19 GM/118ML enema   Yes No    Insert 1 enema into the rectum As Needed. If no results from suppository              Allergies:  Allergies   Allergen Reactions    Dilaudid [Hydromorphone] Anaphylaxis    Hydromorphone Hcl Anaphylaxis     \"coded on it\" Stopped breathing    Ciprofloxacin Unknown - Low Severity     \"I became very sick\"    Penicillins Rash    Sulfa Antibiotics Rash       Objective      Vitals:   Temp:  [97.8 °F (36.6 °C)] 97.8 °F (36.6 °C)  Heart Rate:  [64-89] 69  Resp:  [16-20] 20  BP: (103-126)/(47-55) 126/55  Body mass index is 26.63 kg/m².  Physical Exam  PHYSICAL EXAM  Constitutional:  Well-developed, well-nourished, no acute distress, nontoxic appearance   Eyes:  PERRL, conjunctivae normal, EOMI   HENT:  Atraumatic, external ears normal, nose normal, oropharynx moist, no pharyngeal exudates. Neck-normal range of motion, no tenderness, supple, trachea midline  Respiratory:  ctab, nonlabored respirations without accessory muscle use.  Patient on 5 L nasal cannula  Cardiovascular:  Normal rate, normal rhythm, no murmurs, no gallops, no rubs   GI:  Soft, nondistended, normal bowel sounds, nontender, no organomegaly, no mass, no rebound, no guarding   :  No costovertebral angle tenderness   Musculoskeletal:  No edema, no tenderness, no deformities  Integument:  Well hydrated, no rash   Lymphatic:  No lymphadenopathy noted   Neurologic:  Alert & disoriented, at baseline for patient, CN 2-12 normal, normal motor function, normal sensory function, no focal deficits noted   Psychiatric:  Speech and behavior appropriate    Diagnostic Data:  Lab Results (last 24 hours)       " Procedure Component Value Units Date/Time    Urinalysis, Microscopic Only - Urine, Clean Catch [[id number]]  (Abnormal) Collected: 10/28/24 1512    Specimen: Urine, Clean Catch Updated: 10/28/24 1545     RBC, UA None Seen /HPF      WBC, UA 0-2 /HPF      Comment: Urine culture not indicated.        Bacteria, UA None Seen /HPF      Squamous Epithelial Cells, UA 3-6 /HPF      Hyaline Casts, UA None Seen /LPF      Methodology Manual Light Microscopy    Urinalysis With Culture If Indicated - Urine, Clean Catch [[id number]]  (Abnormal) Collected: 10/28/24 1512    Specimen: Urine, Clean Catch Updated: 10/28/24 1524     Color, UA Dark Yellow     Appearance, UA Cloudy     pH, UA <=5.0     Specific Gravity, UA 1.024     Glucose, UA Negative     Ketones, UA Trace     Bilirubin, UA Small (1+)     Comment: Confirmation testing is unavailable.  A serum bilirubin is recommended for further assessment.        Blood, UA Negative     Protein, UA Trace     Leuk Esterase, UA Small (1+)     Nitrite, UA Positive     Urobilinogen, UA 1.0 E.U./dL    Narrative:      In absence of clinical symptoms, the presence of pyuria, bacteria, and/or nitrites on the urinalysis result does not correlate with infection.    Respiratory Panel PCR w/COVID-19(SARS-CoV-2) CHEIKH/TOMAS/DARÍO/PAD/COR/DANAE In-House, NP Swab in UTM/VTM, 2 HR TAT - Swab, Nasopharynx [[id number]]  (Normal) Collected: 10/28/24 1331    Specimen: Swab from Nasopharynx Updated: 10/28/24 1451     ADENOVIRUS, PCR Not Detected     Coronavirus 229E Not Detected     Coronavirus HKU1 Not Detected     Coronavirus NL63 Not Detected     Coronavirus OC43 Not Detected     COVID19 Not Detected     Human Metapneumovirus Not Detected     Human Rhinovirus/Enterovirus Not Detected     Influenza A PCR Not Detected     Influenza B PCR Not Detected     Parainfluenza Virus 1 Not Detected     Parainfluenza Virus 2 Not Detected     Parainfluenza Virus 3 Not Detected     Parainfluenza Virus 4 Not Detected     RSV, PCR  Not Detected     Bordetella pertussis pcr Not Detected     Bordetella parapertussis PCR Not Detected     Chlamydophila pneumoniae PCR Not Detected     Mycoplasma pneumo by PCR Not Detected    Narrative:      In the setting of a positive respiratory panel with a viral infection PLUS a negative procalcitonin without other underlying concern for bacterial infection, consider observing off antibiotics or discontinuation of antibiotics and continue supportive care. If the respiratory panel is positive for atypical bacterial infection (Bordetella pertussis, Chlamydophila pneumoniae, or Mycoplasma pneumoniae), consider antibiotic de-escalation to target atypical bacterial infection.    Blood Culture - Blood, Arm, Left [[id number]] Collected: 10/28/24 1410    Specimen: Blood from Arm, Left Updated: 10/28/24 1413    POC Lactate [[id number]]  (Normal) Collected: 10/28/24 1410    Specimen: Blood Updated: 10/28/24 1412     Lactate 1.4 mmol/L      Comment: Serial Number: [id number]:  [id number]       Blood Culture - Blood, Arm, Right [[id number]] Collected: 10/28/24 1353    Specimen: Blood from Arm, Right Updated: 10/28/24 1355    Comprehensive Metabolic Panel [[id number]]  (Abnormal) Collected: 10/28/24 1325    Specimen: Blood from Arm, Right Updated: 10/28/24 1354     Glucose 133 mg/dL      BUN 20 mg/dL      Creatinine 0.69 mg/dL      Sodium 134 mmol/L      Potassium 4.0 mmol/L      Chloride 92 mmol/L      CO2 33.2 mmol/L      Calcium 9.6 mg/dL      Total Protein 6.8 g/dL      Albumin 3.7 g/dL      ALT (SGPT) <5 U/L      AST (SGOT) 14 U/L      Alkaline Phosphatase 74 U/L      Total Bilirubin 0.7 mg/dL      Globulin 3.1 gm/dL      A/G Ratio 1.2 g/dL      BUN/Creatinine Ratio 29.0     Anion Gap 8.8 mmol/L      eGFR 85.7 mL/min/1.73     Narrative:      GFR Normal >60  Chronic Kidney Disease <60  Kidney Failure <15    The GFR formula is only valid for adults with stable renal function between ages 18 and 70.    BNP [[id number]]   (Abnormal) Collected: 10/28/24 1325    Specimen: Blood from Arm, Right Updated: 10/28/24 1354     proBNP 2,654.0 pg/mL     Narrative:      This assay is used as an aid in the diagnosis of individuals suspected of having heart failure. It can be used as an aid in the diagnosis of acute decompensated heart failure (ADHF) in patients presenting with signs and symptoms of ADHF to the emergency department (ED). In addition, NT-proBNP of <300 pg/mL indicates ADHF is not likely.    Age Range Result Interpretation  NT-proBNP Concentration (pg/mL:      <50             Positive            >450                   Gray                 300-450                    Negative             <300    50-75           Positive            >900                  Gray                300-900                  Negative            <300      >75             Positive            >1800                  Gray                300-1800                  Negative            <300    Single High Sensitivity Troponin T [[id number]]  (Abnormal) Collected: 10/28/24 1325    Specimen: Blood from Arm, Right Updated: 10/28/24 1354     HS Troponin T 17 ng/L     Narrative:      High Sensitive Troponin T Reference Range:  <14.0 ng/L- Negative Female for AMI  <22.0 ng/L- Negative Male for AMI  >=14 - Abnormal Female indicating possible myocardial injury.  >=22 - Abnormal Male indicating possible myocardial injury.   Clinicians would have to utilize clinical acumen, EKG, Troponin, and serial changes to determine if it is an Acute Myocardial Infarction or myocardial injury due to an underlying chronic condition.         Blood Gas, Arterial - [[id number]]  (Abnormal) Collected: 10/28/24 1341    Specimen: Arterial Blood Updated: 10/28/24 1345     Site Right Radial     Harsha's Test Positive     pH, Arterial 7.435 pH units      pCO2, Arterial 50.7 mm Hg      pO2, Arterial 50.4 mm Hg      HCO3, Arterial 34.0 mmol/L      Base Excess, Arterial 8.4 mmol/L      Comment: Serial Number:  [id number]:  [id number]        O2 Saturation, Arterial 85.7 %      CO2 Content 35.6 mmol/L      Barometric Pressure for Blood Gas --     Comment: N/A        Modality Cannula     FIO2 32 %      Hemodilution No     PO2/FIO2 158    CBC & Differential [[id number]]  (Abnormal) Collected: 10/28/24 1325    Specimen: Blood from Arm, Right Updated: 10/28/24 1334    Narrative:      The following orders were created for panel order CBC & Differential.  Procedure                               Abnormality         Status                     ---------                               -----------         ------                     CBC Auto Differential[[id number]]        Abnormal            Final result                 Please view results for these tests on the individual orders.    CBC Auto Differential [[id number]]  (Abnormal) Collected: 10/28/24 1325    Specimen: Blood from Arm, Right Updated: 10/28/24 1334     WBC 15.81 10*3/mm3      RBC 4.32 10*6/mm3      Hemoglobin 12.1 g/dL      Hematocrit 37.9 %      MCV 87.7 fL      MCH 28.0 pg      MCHC 31.9 g/dL      RDW 14.0 %      RDW-SD 44.9 fl      MPV 9.3 fL      Platelets 261 10*3/mm3      Neutrophil % 74.9 %      Lymphocyte % 15.4 %      Monocyte % 8.7 %      Eosinophil % 0.1 %      Basophil % 0.4 %      Immature Grans % 0.5 %      Neutrophils, Absolute 11.85 10*3/mm3      Lymphocytes, Absolute 2.43 10*3/mm3      Monocytes, Absolute 1.38 10*3/mm3      Eosinophils, Absolute 0.01 10*3/mm3      Basophils, Absolute 0.06 10*3/mm3      Immature Grans, Absolute 0.08 10*3/mm3      nRBC 0.0 /100 WBC     Morris Draw [[id number]] Collected: 10/28/24 1325    Specimen: Blood from Arm, Right Updated: 10/28/24 1331    Narrative:      The following orders were created for panel order Morris Draw.  Procedure                               Abnormality         Status                     ---------                               -----------         ------                     Green Top (Gel)[[id number]]                                   Final result               Lavender Top[[id number]]                                     Final result               Gold Top - SST[[id number]]                                   Final result               Light Blue Top[[id number]]                                   Final result                 Please view results for these tests on the individual orders.    Green Top (Gel) [[id number]] Collected: 10/28/24 1325    Specimen: Blood from Arm, Right Updated: 10/28/24 1331     Extra Tube Hold for add-ons.     Comment: Auto resulted.       Lavender Top [[id number]] Collected: 10/28/24 1325    Specimen: Blood from Arm, Right Updated: 10/28/24 1331     Extra Tube hold for add-on     Comment: Auto resulted       Gold Top - SST [[id number]] Collected: 10/28/24 1325    Specimen: Blood from Arm, Right Updated: 10/28/24 1331     Extra Tube Hold for add-ons.     Comment: Auto resulted.       Light Blue Top [[id number]] Collected: 10/28/24 1325    Specimen: Blood from Arm, Right Updated: 10/28/24 1331     Extra Tube Hold for add-ons.     Comment: Auto resulted                Imaging Results (Last 24 Hours)       Procedure Component Value Units Date/Time    XR Chest 1 View [[id number]] Collected: 10/28/24 1427     Updated: 10/28/24 1430    Narrative:      XR CHEST 1 VW    Date of Exam: 10/28/2024 2:09 PM EDT    Indication: CHF/COPD Protocol  CHF/COPD Protocol    Comparison: AP chest    12/16/2023. CT chest 6/25/2021    Findings:  Low volume inspiration. Moderate right hemidiaphragm elevation residential of the hemithorax, with passive right lung atelectasis. Left lung appears clear. Benign calcified granuloma in the left midlung. Heart size is mildly enlarged but stable. Mitral   annulus calcifications are noted. Advanced degenerative changes of both shoulders with suspected calcified loose bodies in each shoulder joint. Osteopenic changes are present. No acute osseous abnormalities.      Impression:       Impression:    1. No acute chest findings.  2. Appears stable moderate right hemidiaphragm elevation with passive right lung atelectasis.      Electronically Signed: Julia Sanders MD    10/28/2024 2:28 PM EDT    Workstation ID: [id number]              Assessment & Plan        This is a 83 y.o. female with:    Active and Resolved Problems  Active Hospital Problems    Diagnosis  POA    **Shortness of breath [R06.02]  Yes      Resolved Hospital Problems   No resolved problems to display.     Shortness of breath  Compensated respiratory acidosis  -ABG: pH 7.43, CO2 50.7, pO2 50.4, HCO3 34, O2 saturation 85.7, CO2 content 35.6,  -Troponin 17  -BNP 2,654.0  -Continue oxygen supplementation, titrate as tolerated  -Lasix IV x 1  -CT PE protocol pending  -Echo pending  -Lovenox for VTE prophylaxis, consider changing pending CT results    Leukocytosis  -WBC 15.8  -UA positive nitrite  -Lactic acid 1.4  -Cefepime given in ED.  Continue Seph  -Urine culture pending  -Blood culture pending    GERD  -Continue Prilosec    HTN  -Hold hydrochlorothiazide  -Continue amlodipine, carvedilol, and aspirin    DM  -Hemoglobin A1c pending  -Accu-Cheks before meals and at bedtime  -Continue metformin    Significant event  5:10 PM EDT  -patient hypoxic, more disoriented, 02 increased 7 liters, repeat abg.   - CTPE negative for PE, +peumonia.  Patient started on ceftriaxone, Solu-Medrol, DuoNebs every 4 hours, Pulmicort twice daily.     5:31 PM EDT  - patient 02 increased to 8.5L  - Patient with moist nonproductive cough.  NT suction order  - ABG remarkable: pH 7.424, CO2 56.5, O2 47.3, HCO3 37, O2 saturation 82.5, CO2 content 38.8,  -Patient will be placed on Airvo once transported to the floor.  -CT of abdomen pelvis pending as patient has history of cirrhosis, as well as hernia        VTE Prophylaxis:  Pharmacologic VTE prophylaxis orders are present.        The patient desires to be as follows:    CODE STATUS:           Michelle Jones, who  can be contacted at [phone], is the designated person to make medical decisions on the patient's behalf if She is incapable of doing so. This was clarified with patient and/or next of kin on 10/28/2024 during the course of this H&P.    Admission Status:  I believe this patient meets inpatient status.    Expected Length of Stay: 3    PDMP and Medication Dispenses via Sidebar reviewed and consistent with patient reported medications.    I discussed the patient's findings and my recommendations with patient and nursing staff.      Signature:     This document has been electronically signed by ALEXANDRA Fowler on October 28, 2024 16:54 EDT   [facility] Hospitalist Team

## 2024-10-28 NOTE — ED PROVIDER NOTES
"Subjective   History of Present Illness  Patient is an 83-year-old white woman presenting to the ER with shortness of breath.  Patient was brought in by EMS from [facility].  Patient is disoriented, but at baseline.  Patient is unable to provide details about condition.  Patient describes that she feels like she cannot catch her breath but denies chest pain.  EMS states she is on 3 L via nasal cannula upon their arrival.  Per family that is now at bedside she is not normally on oxygen at the facility.  Patient denies fevers and chest pain.  They do report a slight cough.  No reports of increased swelling.  No abdominal pain, nausea, vomiting, diarrhea, or urinary complaints    History provided by:  Patient, medical records and relative   used: No        Review of Systems   Constitutional: Negative.    HENT:  Positive for rhinorrhea. Negative for congestion, ear discharge, ear pain and sore throat.    Eyes:  Negative for photophobia and visual disturbance.   Respiratory:  Positive for cough and shortness of breath.    Cardiovascular:  Negative for chest pain, palpitations and leg swelling.   Gastrointestinal:  Negative for abdominal distention, abdominal pain, nausea and vomiting.   Musculoskeletal:  Negative for neck pain and neck stiffness.   Skin: Negative.    Neurological:  Negative for seizures and syncope.       Past Medical History:   Diagnosis Date    Arthritis     CHF (congestive heart failure)     Diabetes mellitus     Disease of thyroid gland     Esophageal spasm     Gastritis     GERD (gastroesophageal reflux disease)     Hyperlipidemia     Hypertension     IBS (irritable bowel syndrome)     Sleep apnea        Allergies   Allergen Reactions    Dilaudid [Hydromorphone] Anaphylaxis    Hydromorphone Hcl Anaphylaxis     \"coded on it\" Stopped breathing    Ciprofloxacin Unknown - Low Severity     \"I became very sick\"    Penicillins Rash    Sulfa Antibiotics Rash       Past " Surgical History:   Procedure Laterality Date    BLADDER SUSPENSION      CARDIAC CATHETERIZATION      CHOLECYSTECTOMY      HYSTERECTOMY         History reviewed. No pertinent family history.    Social History     Socioeconomic History    Marital status:    Tobacco Use    Smoking status: Never     Passive exposure: Never    Smokeless tobacco: Never   Vaping Use    Vaping status: Never Used   Substance and Sexual Activity    Alcohol use: Not Currently    Drug use: Never    Sexual activity: Defer           Objective   Physical Exam  Vitals and nursing note reviewed.   Constitutional:       General: She is not in acute distress.     Appearance: Normal appearance. She is well-developed. She is obese. She is not ill-appearing, toxic-appearing or diaphoretic.   HENT:      Head: Normocephalic and atraumatic.      Mouth/Throat:      Mouth: Mucous membranes are moist.      Pharynx: Oropharynx is clear.   Eyes:      General: No scleral icterus.     Extraocular Movements: Extraocular movements intact.      Pupils: Pupils are equal, round, and reactive to light.   Cardiovascular:      Rate and Rhythm: Normal rate and regular rhythm.      Heart sounds: No murmur heard.     No friction rub. No gallop.   Pulmonary:      Effort: Pulmonary effort is normal. No respiratory distress.      Breath sounds: No stridor. Wheezing present. No decreased breath sounds, rhonchi or rales.   Chest:      Chest wall: No tenderness.   Abdominal:      General: Bowel sounds are normal. There is no distension. There are no signs of injury.      Palpations: Abdomen is soft.      Tenderness: There is no abdominal tenderness. There is no guarding or rebound.   Musculoskeletal:      Cervical back: Neck supple.   Skin:     General: Skin is warm.      Capillary Refill: Capillary refill takes less than 2 seconds.      Coloration: Skin is not cyanotic, jaundiced or pale.      Findings: No rash.   Neurological:      General: No focal deficit present.       "Mental Status: She is alert and oriented to person, place, and time.      GCS: GCS eye subscore is 4. GCS verbal subscore is 5. GCS motor subscore is 6.   Psychiatric:         Mood and Affect: Mood normal.         Behavior: Behavior normal.         Procedures           ED Course    /63 (BP Location: Left arm, Patient Position: Lying)   Pulse 83   Temp 98.7 °F (37.1 °C) (Oral)   Resp 20   Ht 170.2 cm (67.01\")   Wt 77.7 kg (171 lb 4.8 oz)   SpO2 93%   BMI 26.82 kg/m²   Medications   sodium chloride 0.9 % flush 10 mL (has no administration in time range)   sodium chloride 0.9 % flush 10 mL (10 mL Intravenous Given 10/28/24 2105)   sodium chloride 0.9 % flush 10 mL (has no administration in time range)   Enoxaparin Sodium (LOVENOX) syringe 40 mg (has no administration in time range)   sodium chloride 0.9 % infusion 40 mL (has no administration in time range)   Potassium Replacement - Follow Nurse / BPA Driven Protocol (has no administration in time range)   Magnesium Standard Dose Replacement - Follow Nurse / BPA Driven Protocol (has no administration in time range)   Phosphorus Replacement - Follow Nurse / BPA Driven Protocol (has no administration in time range)   Calcium Replacement - Follow Nurse / BPA Driven Protocol (has no administration in time range)   sennosides-docusate (PERICOLACE) 8.6-50 MG per tablet 2 tablet (has no administration in time range)     And   polyethylene glycol (MIRALAX) packet 17 g (has no administration in time range)     And   bisacodyl (DULCOLAX) EC tablet 5 mg (has no administration in time range)     And   bisacodyl (DULCOLAX) suppository 10 mg (has no administration in time range)   acetaminophen (TYLENOL) tablet 650 mg (has no administration in time range)   amLODIPine (NORVASC) tablet 10 mg (10 mg Oral Not Given 10/28/24 2101)   aspirin chewable tablet 81 mg (81 mg Oral Not Given 10/28/24 2101)   buPROPion (WELLBUTRIN) tablet 75 mg (75 mg Oral Not Given 10/28/24 2101) "   carvedilol (COREG) tablet 12.5 mg (12.5 mg Oral Not Given 10/28/24 2101)   clonazePAM (KlonoPIN) tablet 0.5 mg ( Oral Dose Auto Held 11/1/24 2100)   dicyclomine (BENTYL) capsule 10 mg (10 mg Oral Not Given 10/28/24 2102)   diphenoxylate-atropine (LOMOTIL) 2.5-0.025 MG per tablet 1 tablet (has no administration in time range)   gabapentin (NEURONTIN) capsule 100 mg ( Oral Dose Auto Held 11/5/24 2100)   hydroCHLOROthiazide tablet 25 mg ( Oral Dose Auto Held 11/5/24 0900)   levothyroxine (SYNTHROID, LEVOTHROID) tablet 25 mcg (has no administration in time range)   muscle rub (BenGay) 10-15 % cream 1 Application (has no administration in time range)   cefTRIAXone (ROCEPHIN) 2,000 mg in sodium chloride 0.9 % 100 mL MBP (2,000 mg Intravenous New Bag 10/28/24 2104)   methylPREDNISolone sodium succinate (SOLU-Medrol) injection 40 mg (has no administration in time range)   ipratropium-albuterol (DUO-NEB) nebulizer solution 3 mL (3 mL Nebulization Not Given 10/28/24 2025)   budesonide (PULMICORT) nebulizer solution 0.5 mg (0.5 mg Nebulization Not Given 10/28/24 2025)   miconazole (MICOTIN) 2 % powder ( Topical Given 10/28/24 2104)   mirtazapine (REMERON) tablet 7.5 mg (7.5 mg Oral Not Given 10/28/24 2103)   nitroglycerin (NITROSTAT) SL tablet 0.4 mg (has no administration in time range)   pantoprazole (PROTONIX) EC tablet 40 mg (has no administration in time range)   ursodiol (ACTIGALL) tablet 500 mg (500 mg Oral Not Given 10/28/24 2103)   venlafaxine XR (EFFEXOR-XR) 24 hr capsule 150 mg (150 mg Oral Not Given 10/28/24 2102)   dextrose (GLUTOSE) oral gel 15 g (has no administration in time range)   dextrose (D50W) (25 g/50 mL) IV injection 25 g (has no administration in time range)   glucagon (GLUCAGEN) injection 1 mg (has no administration in time range)   insulin lispro (HUMALOG/ADMELOG) injection 2-7 Units (has no administration in time range)   furosemide (LASIX) injection 40 mg (40 mg Intravenous Given 10/28/24 9491)    iopamidol (ISOVUE-370) 76 % injection 100 mL (100 mL Intravenous Given 10/28/24 1656)   methylPREDNISolone sodium succinate (SOLU-Medrol) injection 125 mg (125 mg Intravenous Given 10/28/24 2103)     Labs Reviewed   COMPREHENSIVE METABOLIC PANEL - Abnormal; Notable for the following components:       Result Value    Glucose 133 (*)     Sodium 134 (*)     Chloride 92 (*)     CO2 33.2 (*)     BUN/Creatinine Ratio 29.0 (*)     All other components within normal limits    Narrative:     GFR Normal >60  Chronic Kidney Disease <60  Kidney Failure <15    The GFR formula is only valid for adults with stable renal function between ages 18 and 70.   BNP (IN-HOUSE) - Abnormal; Notable for the following components:    proBNP 2,654.0 (*)     All other components within normal limits    Narrative:     This assay is used as an aid in the diagnosis of individuals suspected of having heart failure. It can be used as an aid in the diagnosis of acute decompensated heart failure (ADHF) in patients presenting with signs and symptoms of ADHF to the emergency department (ED). In addition, NT-proBNP of <300 pg/mL indicates ADHF is not likely.    Age Range Result Interpretation  NT-proBNP Concentration (pg/mL:      <50             Positive            >450                   Gray                 300-450                    Negative             <300    50-75           Positive            >900                  Gray                300-900                  Negative            <300      >75             Positive            >1800                  Gray                300-1800                  Negative            <300   SINGLE HS TROPONIN T - Abnormal; Notable for the following components:    HS Troponin T 17 (*)     All other components within normal limits    Narrative:     High Sensitive Troponin T Reference Range:  <14.0 ng/L- Negative Female for AMI  <22.0 ng/L- Negative Male for AMI  >=14 - Abnormal Female indicating possible myocardial  injury.  >=22 - Abnormal Male indicating possible myocardial injury.   Clinicians would have to utilize clinical acumen, EKG, Troponin, and serial changes to determine if it is an Acute Myocardial Infarction or myocardial injury due to an underlying chronic condition.        BLOOD GAS, ARTERIAL - Abnormal; Notable for the following components:    pCO2, Arterial 50.7 (*)     pO2, Arterial 50.4 (*)     HCO3, Arterial 34.0 (*)     Base Excess, Arterial 8.4 (*)     O2 Saturation, Arterial 85.7 (*)     CO2 Content 35.6 (*)     All other components within normal limits   CBC WITH AUTO DIFFERENTIAL - Abnormal; Notable for the following components:    WBC 15.81 (*)     Lymphocyte % 15.4 (*)     Eosinophil % 0.1 (*)     Neutrophils, Absolute 11.85 (*)     Monocytes, Absolute 1.38 (*)     Immature Grans, Absolute 0.08 (*)     All other components within normal limits   URINALYSIS W/ CULTURE IF INDICATED - Abnormal; Notable for the following components:    Color, UA Dark Yellow (*)     Appearance, UA Cloudy (*)     Ketones, UA Trace (*)     Bilirubin, UA Small (1+) (*)     Protein, UA Trace (*)     Leuk Esterase, UA Small (1+) (*)     Nitrite, UA Positive (*)     All other components within normal limits    Narrative:     In absence of clinical symptoms, the presence of pyuria, bacteria, and/or nitrites on the urinalysis result does not correlate with infection.   URINALYSIS, MICROSCOPIC ONLY - Abnormal; Notable for the following components:    Squamous Epithelial Cells, UA 3-6 (*)     All other components within normal limits   BLOOD GAS, ARTERIAL - Abnormal; Notable for the following components:    pCO2, Arterial 56.5 (*)     pO2, Arterial 47.3 (*)     HCO3, Arterial 37.0 (*)     Base Excess, Arterial 10.4 (*)     O2 Saturation, Arterial 82.5 (*)     CO2 Content 38.8 (*)     All other components within normal limits   RESPIRATORY PANEL PCR W/ COVID-19 (SARS-COV-2), NP SWAB IN UTM/VTP, 2 HR TAT - Normal    Narrative:     In  the setting of a positive respiratory panel with a viral infection PLUS a negative procalcitonin without other underlying concern for bacterial infection, consider observing off antibiotics or discontinuation of antibiotics and continue supportive care. If the respiratory panel is positive for atypical bacterial infection (Bordetella pertussis, Chlamydophila pneumoniae, or Mycoplasma pneumoniae), consider antibiotic de-escalation to target atypical bacterial infection.   POC LACTATE - Normal   BLOOD CULTURE   BLOOD CULTURE   RAINBOW DRAW    Narrative:     The following orders were created for panel order [place] Draw.  Procedure                               Abnormality         Status                     ---------                               -----------         ------                     Green Top (Gel)[[id number]]                                  Final result               Lavender Top[[id number]]                                     Final result               Gold Top - SST[[id number]]                                   Final result               Light Blue Top[[id number]]                                   Final result                 Please view results for these tests on the individual orders.   POC LACTATE   POCT GLUCOSE FINGERSTICK   POCT GLUCOSE FINGERSTICK   POCT GLUCOSE FINGERSTICK   POCT GLUCOSE FINGERSTICK   GREEN TOP   LAVENDER TOP   GOLD TOP - SST   LIGHT BLUE TOP   CBC AND DIFFERENTIAL    Narrative:     The following orders were created for panel order CBC & Differential.  Procedure                               Abnormality         Status                     ---------                               -----------         ------                     CBC Auto Differential[[id number]]        Abnormal            Final result                 Please view results for these tests on the individual orders.     CT Abdomen Pelvis Without Contrast   Final Result   Lower lobe atelectasis involving both lungs.                Electronically Signed: Tonny Chen MD     10/28/2024 6:22 PM EDT     Workstation ID: [id number]      CT Angiogram Chest Pulmonary Embolism   Final Result   Impression:      1. No pulmonary embolism.   2. Dense consolidations within the bilateral lower lobes. Correlate for pneumonia or sequela of aspiration.   3. Partial right middle lobe atelectasis may be related to elevation the right hemidiaphragm.   4. Benign calcified granulomatous changes in the chest.   5. Cirrhotic liver morphology. Correlate with known history.   6. Additional chronic findings as described above.            Electronically Signed: Julia Sanders MD     10/28/2024 5:06 PM EDT     Workstation ID: [id number]      XR Chest 1 View   Final Result   Impression:      1. No acute chest findings.   2. Appears stable moderate right hemidiaphragm elevation with passive right lung atelectasis.         Electronically Signed: Julia Sanders MD     10/28/2024 2:28 PM EDT     Workstation ID: [id number]                                                   Medical Decision Making  Chart Review: Discharge summary reviewed from 12/18/2023 admitted for acute respiratory distress and sepsis was noted patient likely has underlying lung disease due to her longstanding history of secondhand smoke was started on breathing treatment steroids and azithromycin    Differentials: Pneumonia, bronchitis, URI, viral illness, CHF, COPD     ;this list is not all inclusive and does not constitute the entirety of considered causes  EKG: Interpreted by myself and Dr. Gonzalez shows sinus rhythm rate 68 old inferior infarct previous EKG reviewed from 12/6/2023  Labs: As above  Radiology:   CT Abdomen Pelvis Without Contrast   Final Result    Lower lobe atelectasis involving both lungs.    Electronically Signed: Tonny Chen MD      10/28/2024 6:22 PM EDT      Workstation ID: [id number]     CT Angiogram Chest Pulmonary Embolism   Final Result    Impression:    1. No pulmonary embolism.    2.  Dense consolidations within the bilateral lower lobes. Correlate for pneumonia or sequela of aspiration.    3. Partial right middle lobe atelectasis may be related to elevation the right hemidiaphragm.    4. Benign calcified granulomatous changes in the chest.    5. Cirrhotic liver morphology. Correlate with known history.    6. Additional chronic findings as described above.        Electronically Signed: Julia Sanders MD      10/28/2024 5:06 PM EDT      Workstation ID: [id number]     XR Chest 1 View   Final Result    Impression:    1. No acute chest findings.    2. Appears stable moderate right hemidiaphragm elevation with passive right lung atelectasis.        Electronically Signed: Julia Sanders MD      10/28/2024 2:28 PM EDT      Workstation ID: [id number]    Disposition/Treatment:  Appropriate PPE was worn during exam and throughout all encounters with the patient.  Patient presented to the ED with reports of increased shortness of breath and a cough that started today.  Per family that is now at bedside she is normally on room air at the facility EMS arrived and patient was on 3 L and was somewhat hypoxic.  Was given a breathing treatment and route.  ABG obtained upon arrival results as above.  Patient's O2 was bumped to 5 L now satting in the 90s.  EKG promptly obtained which showed no acute STEMI.  Labs and imaging also obtained while in the ED    CBC showed a white count of 15.81 no signs of anemia.  Metabolic panel fairly unremarkable.  Initial troponin 17 she denies chest pain while in the ED and EKG showed no acute STEMI.  BNP elevated at 2654.  She was given a dose of Lasix while in the ED.  Chest x-ray showed no acute findings respiratory panel was negative.  Urinalysis showed no signs UTI.  POC lactic normal and blood cultures are pending.  CT PE also ordered which showed no acute PE but did show disc consolidations of bilateral lower lobes consistent with pneumonia antibiotic coverage was discussed  with the ER pharmacist Gardenia will give dose of Rocephin while in the ED patient's hypoxia likely secondary to pneumonia.  Findings were discussed with the patient and family at bedside voiced understand admission and were in agreement with plan.  Spoke to hospitalist group who agreed for admission.  All questions were answered at bedside.    Problems Addressed:  Congestive heart failure, unspecified HF chronicity, unspecified heart failure type: complicated acute illness or injury  Dyspnea, unspecified type: complicated acute illness or injury  Hypoxia: complicated acute illness or injury  Pneumonia due to infectious organism, unspecified laterality, unspecified part of lung: complicated acute illness or injury    Amount and/or Complexity of Data Reviewed  Labs: ordered.  Radiology: ordered.  ECG/medicine tests: ordered.    Risk  Prescription drug management.  Decision regarding hospitalization.        Final diagnoses:   Dyspnea, unspecified type   Hypoxia   Congestive heart failure, unspecified HF chronicity, unspecified heart failure type   Pneumonia due to infectious organism, unspecified laterality, unspecified part of lung       ED Disposition  ED Disposition       ED Disposition   Decision to Admit    Condition   --    Comment   Level of Care: Telemetry [5]   Diagnosis: Shortness of breath [786.05.ICD-9-CM]   Admitting Physician: NATHAN YOUNGER [[id number]]   Attending Physician: NATHAN YOUNGER [[id number]]   Isolate for COVID?: No [0]   Certification: I Certify That Inpatient Hospital Services Are Medically Necessary For Greater Than 2 Midnights                 No follow-up provider specified.       Medication List      No changes were made to your prescriptions during this visit.            Amrit Nelson PA  10/28/24 4022

## 2024-10-28 NOTE — NURSING NOTE
Nursing report ED to floor  Nadya Hurtado  83 y.o.  female    HPI:   Chief Complaint   Patient presents with    Shortness of Breath       Admitting doctor:   Mitch Quinonez MD    Admitting diagnosis:   The primary encounter diagnosis was Dyspnea, unspecified type. Diagnoses of Hypoxia and Congestive heart failure, unspecified HF chronicity, unspecified heart failure type were also pertinent to this visit.    Code status:   Current Code Status       Date Active Code Status Order ID Comments User Context       Prior            Allergies:   Dilaudid [hydromorphone], Hydromorphone hcl, Ciprofloxacin, Penicillins, and Sulfa antibiotics    Isolation:  No active isolations     Fall Risk:  Fall Risk Assessment was completed, and patient is at high risk for falls.   Predictive Model Details         18 (Low) Factor Value    Calculated 10/28/2024 17:03 Age 83    Risk of Fall Model Active Peripheral IV Present     Imaging order in this encounter Present     Respiratory Rate 20     Diastolic BP 55     Magnesium not on file     Chloride 92 mmol/L     Arthur Scale not on file     Number of Distinct Medication Classes administered 2     Albumin 3.7 g/dL     Total Bilirubin 0.7 mg/dL     ALT <5 U/L     Days after Admission 0.179     Creatinine 0.69 mg/dL     Calcium 9.6 mg/dL     Potassium 4 mmol/L         Weight:       10/28/24  1244   Weight: 77.1 kg (170 lb)       Intake and Output  No intake or output data in the 24 hours ending 10/28/24 1704    Diet:   Dietary Orders (From admission, onward)       Start     Ordered    10/28/24 1647  Diet: Cardiac; Healthy Heart (2-3 Na+); Fluid Consistency: Thin (IDDSI 0)  Diet Effective Now        References:    Diet Order [facility]   Question Answer Comment   Diets: Cardiac    Cardiac Diet: Healthy Heart (2-3 Na+)    Fluid Consistency: Thin (IDDSI 0)        10/28/24 1648                     Most recent vitals:   Vitals:    10/28/24 1430 10/28/24 1431 10/28/24 1446 10/28/24 1501   BP:   118/51 116/51 126/55   Pulse:  64 67 69   Resp:       Temp:       TempSrc:       SpO2: 91%  90% 94%   Weight:       Height:           Active LDAs/IV Access:   Lines, Drains & Airways       Active LDAs       Name Placement date Placement time Site Days    Peripheral IV 10/28/24 1339 Right Antecubital 10/28/24  1339  Antecubital  less than 1                    Skin Condition:   Skin Assessments (last day)       None             Labs (abnormal labs have a star):   Labs Reviewed   COMPREHENSIVE METABOLIC PANEL - Abnormal; Notable for the following components:       Result Value    Glucose 133 (*)     Sodium 134 (*)     Chloride 92 (*)     CO2 33.2 (*)     BUN/Creatinine Ratio 29.0 (*)     All other components within normal limits    Narrative:     GFR Normal >60  Chronic Kidney Disease <60  Kidney Failure <15    The GFR formula is only valid for adults with stable renal function between ages 18 and 70.   BNP (IN-HOUSE) - Abnormal; Notable for the following components:    proBNP 2,654.0 (*)     All other components within normal limits    Narrative:     This assay is used as an aid in the diagnosis of individuals suspected of having heart failure. It can be used as an aid in the diagnosis of acute decompensated heart failure (ADHF) in patients presenting with signs and symptoms of ADHF to the emergency department (ED). In addition, NT-proBNP of <300 pg/mL indicates ADHF is not likely.    Age Range Result Interpretation  NT-proBNP Concentration (pg/mL:      <50             Positive            >450                   Gray                 300-450                    Negative             <300    50-75           Positive            >900                  Gray                300-900                  Negative            <300      >75             Positive            >1800                  Gray                300-1800                  Negative            <300   SINGLE HS TROPONIN T - Abnormal; Notable for the following components:     HS Troponin T 17 (*)     All other components within normal limits    Narrative:     High Sensitive Troponin T Reference Range:  <14.0 ng/L- Negative Female for AMI  <22.0 ng/L- Negative Male for AMI  >=14 - Abnormal Female indicating possible myocardial injury.  >=22 - Abnormal Male indicating possible myocardial injury.   Clinicians would have to utilize clinical acumen, EKG, Troponin, and serial changes to determine if it is an Acute Myocardial Infarction or myocardial injury due to an underlying chronic condition.        BLOOD GAS, ARTERIAL - Abnormal; Notable for the following components:    pCO2, Arterial 50.7 (*)     pO2, Arterial 50.4 (*)     HCO3, Arterial 34.0 (*)     Base Excess, Arterial 8.4 (*)     O2 Saturation, Arterial 85.7 (*)     CO2 Content 35.6 (*)     All other components within normal limits   CBC WITH AUTO DIFFERENTIAL - Abnormal; Notable for the following components:    WBC 15.81 (*)     Lymphocyte % 15.4 (*)     Eosinophil % 0.1 (*)     Neutrophils, Absolute 11.85 (*)     Monocytes, Absolute 1.38 (*)     Immature Grans, Absolute 0.08 (*)     All other components within normal limits   URINALYSIS W/ CULTURE IF INDICATED - Abnormal; Notable for the following components:    Color, UA Dark Yellow (*)     Appearance, UA Cloudy (*)     Ketones, UA Trace (*)     Bilirubin, UA Small (1+) (*)     Protein, UA Trace (*)     Leuk Esterase, UA Small (1+) (*)     Nitrite, UA Positive (*)     All other components within normal limits    Narrative:     In absence of clinical symptoms, the presence of pyuria, bacteria, and/or nitrites on the urinalysis result does not correlate with infection.   URINALYSIS, MICROSCOPIC ONLY - Abnormal; Notable for the following components:    Squamous Epithelial Cells, UA 3-6 (*)     All other components within normal limits   RESPIRATORY PANEL PCR W/ COVID-19 (SARS-COV-2), NP SWAB IN UTM/VTP, 2 HR TAT - Normal    Narrative:     In the setting of a positive respiratory  panel with a viral infection PLUS a negative procalcitonin without other underlying concern for bacterial infection, consider observing off antibiotics or discontinuation of antibiotics and continue supportive care. If the respiratory panel is positive for atypical bacterial infection (Bordetella pertussis, Chlamydophila pneumoniae, or Mycoplasma pneumoniae), consider antibiotic de-escalation to target atypical bacterial infection.   POC LACTATE - Normal   BLOOD CULTURE   BLOOD CULTURE   RAINBOW DRAW    Narrative:     The following orders were created for panel order [facility] Draw.  Procedure                               Abnormality         Status                     ---------                               -----------         ------                     Green Top (Gel)[[id number]]                                  Final result               Lavender Top[[id number]]                                     Final result               Gold Top - SST[[id number]]                                   Final result               Light Blue Top[[id number]]                                   Final result                 Please view results for these tests on the individual orders.   POC LACTATE   GREEN TOP   LAVENDER TOP   GOLD TOP - SST   LIGHT BLUE TOP   CBC AND DIFFERENTIAL    Narrative:     The following orders were created for panel order CBC & Differential.  Procedure                               Abnormality         Status                     ---------                               -----------         ------                     CBC Auto Differential[[id number]]        Abnormal            Final result                 Please view results for these tests on the individual orders.       LOC: Person, Time, and Situation    Telemetry:  Telemetry    Cardiac Monitoring Ordered: yes    EKG:   ECG 12 Lead Dyspnea   Preliminary Result   HEART RATE=68  bpm   RR Zwmufduy=029  ms   AL Oeorswnz=869  ms   P Horizontal Axis=-1  deg   P Front Axis=-9   deg   QRSD Interval=83  ms   QT Iyvdmoxn=481  ms   EZcK=087  ms   QRS Axis=-27  deg   T Wave Axis=44  deg   - ABNORMAL ECG -   Sinus rhythm   Inferior infarct, old   When compared with ECG of 16-Dec-2023 20:54:50,   Significant repolarization change   Significant axis, voltage or hypertrophy change   Date and Time of Study:2024-10-28 13:18:33          Medications Given in the ED:   Medications   sodium chloride 0.9 % flush 10 mL (has no administration in time range)   sodium chloride 0.9 % flush 10 mL (has no administration in time range)   sodium chloride 0.9 % flush 10 mL (has no administration in time range)   Enoxaparin Sodium (LOVENOX) syringe 40 mg (has no administration in time range)   sodium chloride 0.9 % infusion 40 mL (has no administration in time range)   Potassium Replacement - Follow Nurse / BPA Driven Protocol (has no administration in time range)   Magnesium Standard Dose Replacement - Follow Nurse / BPA Driven Protocol (has no administration in time range)   Phosphorus Replacement - Follow Nurse / BPA Driven Protocol (has no administration in time range)   Calcium Replacement - Follow Nurse / BPA Driven Protocol (has no administration in time range)   sennosides-docusate (PERICOLACE) 8.6-50 MG per tablet 2 tablet (has no administration in time range)     And   polyethylene glycol (MIRALAX) packet 17 g (has no administration in time range)     And   bisacodyl (DULCOLAX) EC tablet 5 mg (has no administration in time range)     And   bisacodyl (DULCOLAX) suppository 10 mg (has no administration in time range)   furosemide (LASIX) injection 40 mg (40 mg Intravenous Given 10/28/24 1556)   iopamidol (ISOVUE-370) 76 % injection 100 mL (100 mL Intravenous Given 10/28/24 1656)       Imaging results:  XR Chest 1 View    Result Date: 10/28/2024  Impression: 1. No acute chest findings. 2. Appears stable moderate right hemidiaphragm elevation with passive right lung atelectasis. Electronically Signed: Julia  MD Marilyn  10/28/2024 2:28 PM EDT  Workstation ID: [id number]     Social issues:   Social History     Socioeconomic History    Marital status:    Tobacco Use    Smoking status: Never     Passive exposure: Never    Smokeless tobacco: Never   Vaping Use    Vaping status: Never Used   Substance and Sexual Activity    Alcohol use: Not Currently    Drug use: Never    Sexual activity: Defer       NIH Stroke Scale:  Interval: (not recorded)  1a. Level of Consciousness: (not recorded)  1b. LOC Questions: (not recorded)  1c. LOC Commands: (not recorded)  2. Best Gaze: (not recorded)  3. Visual: (not recorded)  4. Facial Palsy: (not recorded)  5a. Motor Arm, Left: (not recorded)  5b. Motor Arm, Right: (not recorded)  6a. Motor Leg, Left: (not recorded)  6b. Motor Leg, Right: (not recorded)  7. Limb Ataxia: (not recorded)  8. Sensory: (not recorded)  9. Best Language: (not recorded)  10. Dysarthria: (not recorded)  11. Extinction and Inattention (formerly Neglect): (not recorded)    Total (NIH Stroke Scale): (not recorded)     Additional notable assessment information:       Nursing report ED to floor:  Cristina Bower RN   10/28/24 17:04 EDT

## 2024-10-29 NOTE — CONSULTS
visited patient in regards to spiritual care consult.  Patient was lethargic and did not communicate.  Patient's family at bedside.   offered supportive presence and supportive conversation.

## 2024-10-29 NOTE — PLAN OF CARE
Goal Outcome Evaluation:  Plan of Care Reviewed With: patient        Progress: no change  Outcome Evaluation: Patient consistently talks about death and how she's ready to go. Patient is a DNR. Palliative care consulted. Has right lower lobe atelactesis but no indication of pnemonia. No indication of UTI, either. Blood cultures gotten on patient and patient start on vanc for positive blood culture growth.

## 2024-10-29 NOTE — PROGRESS NOTES
RT called to wean Airvo due to patient being NPO, RN wanting to switch to high flow nasal cannula, however upon assessment, patient sats 90% on 35L and 55%. Unable to wean at this time.   Nevaeh Sims, CRT

## 2024-10-29 NOTE — NURSING NOTE
83-year-old female presented to the hospital with complaints of shortness of breath.  The chart is reviewed photos on the chart are noted.  Patient presenting with areas of erythema to her sacrum and right heel.  Recommend implementing pressure injury interventions

## 2024-10-29 NOTE — CONSULTS
Group: [facility]         CONSULT NOTE    Patient Identification:  Nadya Hurtado  83 y.o.  female  1940  [id number]            Requesting physician: Attending physician    Reason for Consultation: Hypoxia      History of Present Illness:  83-year-old female with history of CHF, dementia, HTN, HLD and GERD who presented 10/28/2024 from [facility] with complaints of acute onset of shortness of breath and a new requirement for oxygen.  CTA was negative for PE but positive for pneumonia.  Patient is afebrile, pulse 82, blood pressure 116/65, oxygen saturation 91% on Airvo 35L/55%.  proBNP 2654, bicarb 33.3, WBCs 15, hemoglobin 11.8, negative respiratory panel    Results for orders placed during the hospital encounter of 11/13/22    Adult Transthoracic Echo Complete W/ Cont if Necessary Per Protocol    Interpretation Summary    Estimated right ventricular systolic pressure from tricuspid regurgitation is mildly elevated (35-45 mmHg).      Normal LV size and contractility EF of 60 to 65%  Normal RV size  Mild left atrial enlargement seen.  Agitated saline bubble contrast negative for septal defects  Aortic valve is not well visualized has calcification and decreased cusp separation and some short axis views.  Planimeter valve area is 1.6 cm.  Peak aortic velocity of 2.8 m/s with peak gradient of 32 mmHg and mean gradient of 18 mmHg consistent with moderate aortic stenosis  Mitral annular calcification seen.  Mitral valve Dopplers appear normal  Tricuspid valve appears structurally normal, mild tricuspid regurgitation seen.  Calculated RV systolic pressure of 40 mm of  Calculated RV systolic pressure of 37 mm of  No pericardial effusion seen.  Proximal aorta appears normal in size.          Assessment:  Shortness of breath  Hypoxia with compensated hypercapnia: ABG 7.42/56.5/47.3  Multifocal pneumonia due to unspecified pathogen  Chronic elevation of right hemidiaphragm  Right middle lobe  atelectasis  REBECCA on CPAP 8.5 cm H2O  Leukocytosis  Dementia  GERD  HTN  DM  Liver cirrhosis   mild chronic anemia      Recommendations:  Antibiotics: Rocephin  Inhaled corticosteroids: Pulmicort  Mucinex  Oxygen supplement and titration to maintain saturation 90 to 95%: Currently requiring Airvo, alternate with BiPAP at night  Bronchodilators  IV steroids    BP/glucose control  DVT/GI prophylaxis    I personally reviewed the radiological studies      Review of Sytems:  Constitutional: Negative for chills, and fever and positive for malaise/fatigue.   HENT: Negative.    Eyes: Negative.    Cardiovascular: Negative.    Respiratory: Positive for cough and shortness of breath.    Skin: Negative.    Musculoskeletal: Negative.    Gastrointestinal: Negative.    Genitourinary: Negative.    Neurological: Chronic dementia    Past Medical History:  Past Medical History:   Diagnosis Date    Arthritis     CHF (congestive heart failure)     Diabetes mellitus     Disease of thyroid gland     Esophageal spasm     Gastritis     GERD (gastroesophageal reflux disease)     Hyperlipidemia     Hypertension     IBS (irritable bowel syndrome)     Sleep apnea        Past Surgical History:  Past Surgical History:   Procedure Laterality Date    BLADDER SUSPENSION      CARDIAC CATHETERIZATION      CHOLECYSTECTOMY      HYSTERECTOMY          Home Meds:  Medications Prior to Admission   Medication Sig Dispense Refill Last Dose/Taking    acetaminophen (TYLENOL) 325 MG tablet Take 2 tablets by mouth 2 (Two) Times a Day.   Taking    amLODIPine (NORVASC) 10 MG tablet Take 1 tablet by mouth Daily.   Taking    aspirin 81 MG chewable tablet Chew 1 tablet Daily. 30 tablet 0 Taking    buPROPion (WELLBUTRIN) 75 MG tablet Take 1 tablet by mouth Daily.   Taking    carvedilol (COREG) 12.5 MG tablet Take 1 tablet by mouth 2 (Two) Times a Day With Meals.   Taking    clonazePAM (KlonoPIN) 0.5 MG tablet Take 1 tablet by mouth Every Night.   Taking    gabapentin  (NEURONTIN) 100 MG capsule Take 1 capsule by mouth 3 (Three) Times a Day.   Taking    hydroCHLOROthiazide (HYDRODIURIL) 25 MG tablet Take 1 tablet by mouth Daily.   Taking    icosapent ethyl (VASCEPA) 1 g capsule capsule Take 1 g by mouth 2 (Two) Times a Day With Meals.   Taking    levothyroxine (SYNTHROID, LEVOTHROID) 25 MCG tablet Take 1 tablet by mouth Every Morning.   Taking    Menthol, Topical Analgesic, 4 % gel Apply  topically 2 (Two) Times a Day. Bilateral shoulders, back neck for mild pain   Taking    menthol-zinc oxide (CALMOSEPTINE) 0.44-20.625 % ointment ointment Apply 1 Application topically to the appropriate area as directed Every 12 (Twelve) Hours. Bilateral buttocks topically every shift for tx   Taking    metFORMIN (GLUCOPHAGE) 500 MG tablet Take 1 tablet by mouth 2 (Two) Times a Day With Meals.   Taking    miconazole (MICOTIN) 2 % powder Apply  topically to the appropriate area as directed 2 (Two) Times a Day.   Taking    mineral oil-hydrophilic petrolatum (AQUAPHOR) ointment Apply 1 Application topically to the appropriate area as directed 2 (Two) Times a Day. Bilateral lower extremities   Taking    mirtazapine (REMERON) 7.5 MG tablet Take 1 tablet by mouth Every Night.   Taking    nitrofurantoin, macrocrystal-monohydrate, (MACROBID) 100 MG capsule Take 1 capsule by mouth every night at bedtime.   Taking    nystatin (MYCOSTATIN) 882803 UNIT/GM ointment Apply 1 Application topically to the appropriate area as directed Every 12 (Twelve) Hours As Needed.   Taking As Needed    omeprazole (priLOSEC) 20 MG capsule Take 1 capsule by mouth Daily.   Taking    ursodiol (ACTIGALL) 500 MG tablet Take 1 tablet by mouth 2 (Two) Times a Day.   Taking    venlafaxine XR (EFFEXOR-XR) 150 MG 24 hr capsule Take 1 capsule by mouth Daily.   Taking    acetaminophen (TYLENOL) 325 MG tablet Take 2 tablets by mouth Every 4 (Four) Hours As Needed for Mild Pain.       acetaminophen (TYLENOL) 650 MG suppository Insert 1  "suppository into the rectum Every 4 (Four) Hours As Needed.       albuterol (PROVENTIL) (2.5 MG/3ML) 0.083% nebulizer solution Take 2.5 mg by nebulization Every 6 (Six) Hours As Needed for Shortness of Air.       bisacodyl (DULCOLAX) 10 MG suppository Insert 1 suppository into the rectum As Needed for Constipation. If no results from milk of magnesia.   If no results in 8 hours, give enema.       dextrose (GLUTOSE) 40 % gel Take 15 g by mouth Every 15 (Fifteen) Minutes As Needed for Low Blood Sugar. Blood glucose < 60       dicyclomine (BENTYL) 10 MG capsule Take 1 capsule by mouth Every 6 (Six) Hours.       diphenoxylate-atropine (LOMOTIL) 2.5-0.025 MG per tablet Take 1 tablet by mouth Every 6 (Six) Hours As Needed for Diarrhea.       dry mouth gel (BIOTENE ORALBALANCE) gel Apply  to the mouth or throat Every 6 (Six) Hours As Needed for Dry Mouth. Give 15 cc by mouth every 6 hours as needed for dry mouth rinse 30 seconds then spit out       Glucagon, rDNA, (Glucagon Emergency) 1 MG kit Inject 1 mg as directed As Needed (Blood glucose <60).       guaiFENesin-dextromethorphan (ROBITUSSIN DM) 100-10 MG/5ML syrup Take 5 mL by mouth Every 4 (Four) Hours As Needed for Cough.       magnesium hydroxide (MILK OF MAGNESIA) 400 MG/5ML suspension Take 30 mL by mouth As Needed for Constipation. If no results in 8 hours, give bisacodyl suppository       nitroglycerin (NITROSTAT) 0.4 MG SL tablet Place 1 tablet under the tongue Every 5 (Five) Minutes As Needed for Chest Pain. Take no more than 3 doses in 15 minutes.       ondansetron (ZOFRAN) 4 MG tablet Take 1 tablet by mouth Every 6 (Six) Hours As Needed for Nausea or Vomiting.       Sodium Phosphates (fleet enema) 7-19 GM/118ML enema Insert 1 enema into the rectum As Needed. If no results from suppository          Allergies:  Allergies   Allergen Reactions    Dilaudid [Hydromorphone] Anaphylaxis    Hydromorphone Hcl Anaphylaxis     \"coded on it\" Stopped breathing    " "Ciprofloxacin Unknown - Low Severity     \"I became very sick\"    Penicillins Rash    Sulfa Antibiotics Rash       Social History:   Social History     Socioeconomic History    Marital status:    Tobacco Use    Smoking status: Never     Passive exposure: Never    Smokeless tobacco: Never   Vaping Use    Vaping status: Never Used   Substance and Sexual Activity    Alcohol use: Not Currently    Drug use: Never    Sexual activity: Defer       Family History:  History reviewed. No pertinent family history.    Physical Exam:  /65 (BP Location: Left arm, Patient Position: Lying)   Pulse 82   Temp 98.4 °F (36.9 °C) (Axillary)   Resp 18   Ht 170.2 cm (67.01\")   Wt 77.7 kg (171 lb 4.8 oz)   SpO2 91%   BMI 26.82 kg/m²  Body mass index is 26.82 kg/m². 91% 77.7 kg (171 lb 4.8 oz)  General Appearance:  Alert   HEENT:  Normocephalic, without obvious abnormality, Conjunctiva/corneas clear,.   Nares normal, no drainage     Neck:  Supple, symmetrical, trachea midline. No JVD.  Lungs /Chest wall:   Bilateral basal rhonchi, respirations unlabored, symmetrical wall movement.     Heart:  Regular rate and rhythm, S1 S2 normal  Abdomen: Soft, non-tender, no masses, no organomegaly.    Extremities: No edema, no clubbing or cyanosis    LABS:  Lab Results   Component Value Date    CALCIUM 9.6 10/29/2024     Results from last 7 days   Lab Units 10/29/24  0212 10/28/24  1325   SODIUM mmol/L 136 134*   POTASSIUM mmol/L 4.0 4.0   CHLORIDE mmol/L 91* 92*   CO2 mmol/L 33.3* 33.2*   BUN mg/dL 19 20   CREATININE mg/dL 0.57 0.69   GLUCOSE mg/dL 159* 133*   CALCIUM mg/dL 9.6 9.6   WBC 10*3/mm3 15.06* 15.81*   HEMOGLOBIN g/dL 11.8* 12.1   PLATELETS 10*3/mm3 270 261   ALT (SGPT) U/L <5 <5   AST (SGOT) U/L 11 14   PROBNP pg/mL  --  2,654.0*     Lab Results   Component Value Date    TROPONINI <0.012 09/12/2018    TROPONINT 17 (H) 10/28/2024     Results from last 7 days   Lab Units 10/28/24  1325   HSTROP T ng/L 17*         Results from " last 7 days   Lab Units 10/28/24  1410   LACTATE mmol/L 1.4     Results from last 7 days   Lab Units 10/28/24  1741 10/28/24  1341   PH, ARTERIAL pH units 7.424 7.435   PCO2, ARTERIAL mm Hg 56.5* 50.7*   PO2 ART mm Hg 47.3* 50.4*   O2 SATURATION ART % 82.5* 85.7*   MODALITY  HFNC Cannula     Results from last 7 days   Lab Units 10/28/24  1331   ADENOVIRUS DETECTION BY PCR  Not Detected   CORONAVIRUS 229E  Not Detected   CORONAVIRUS HKU1  Not Detected   CORONAVIRUS NL63  Not Detected   CORONAVIRUS OC43  Not Detected   HUMAN METAPNEUMOVIRUS  Not Detected   HUMAN RHINOVIRUS/ENTEROVIRUS  Not Detected   INFLUENZA B PCR  Not Detected   PARAINFLUENZA 1  Not Detected   PARAINFLUENZA VIRUS 2  Not Detected   PARAINFLUENZA VIRUS 3  Not Detected   PARAINFLUENZA VIRUS 4  Not Detected   BORDETELLA PERTUSSIS PCR  Not Detected   CHLAMYDOPHILA PNEUMONIAE PCR  Not Detected   MYCOPLAMA PNEUMO PCR  Not Detected   INFLUENZA A PCR  Not Detected   RSV, PCR  Not Detected             Lab Results   Component Value Date    TSH 5.380 (H) 11/13/2022     Estimated Creatinine Clearance: 78.9 mL/min (by C-G formula based on SCr of 0.57 mg/dL).  Results from last 7 days   Lab Units 10/28/24  1512   NITRITE UA  Positive*   WBC UA /HPF 0-2   BACTERIA UA /HPF None Seen   SQUAM EPITHEL UA /HPF 3-6*        Imaging:  Imaging Results (Last 24 Hours)       Procedure Component Value Units Date/Time    CT Abdomen Pelvis Without Contrast [[id number]] Collected: 10/28/24 1817     Updated: 10/28/24 1824    Narrative:      CT ABDOMEN PELVIS WO CONTRAST    Date of Exam: 10/28/2024 6:13 PM EDT    Indication: sob.    Comparison: 5/4/2022    Technique: Axial CT images were obtained of the abdomen and pelvis without the administration of contrast. Sagittal and coronal reconstructions were performed.  Automated exposure control and iterative reconstruction methods were used.    FINDINGS:    Lung bases: Calcified granulomata with calcified mediastinal lymph nodes likely  due to prior granulomatous disease. Severe atheromatous disease of the coronary vessels. Bilateral lower lobe atelectatic changes.    Liver: Calcified granuloma    Spleen: Calcified granulomata    Pancreas:No pancreatic masses. No evidence of pancreatitis.    Gallbladder and common bile duct: Previous cholecystectomy    Adrenal glands:No adrenal masses    Kidneys and ureters: Duplicated left ureter. No calculi present within the ureters. Normal caliber ureters.    Urinary bladder:No urinary bladder wall thickening. No bladder masses.    Small bowel:Normal caliber small bowel.    Large bowel:No diverticulosis or diverticulitis. No large bowel masses are appreciated    Appendix: Not seen however there is no evidence of appendicitis    GENITOURINARY: Prior hysterectomy    Ascites or pneumoperitoneum:None.    Adenopathy:None present    Osseous structures: The proximal femurs are intact. Healed left pubic bone fractures. The sacroiliac joints are normal. Degenerative changes of the lumbar spine. No rib fractures.    Other findings: Severe atheromatous disease of the abdominal aorta and visualized branches.      Impression:      Lower lobe atelectasis involving both lungs.          Electronically Signed: Tonny Chen MD    10/28/2024 6:22 PM EDT    Workstation ID: [id number]    CT Angiogram Chest Pulmonary Embolism [[id number]] Collected: 10/28/24 1700     Updated: 10/28/24 1708    Narrative:      CT ANGIOGRAM CHEST PULMONARY EMBOLISM    Date of Exam: 10/28/2024 4:55 PM EDT    Indication: shortness of breath.    Comparison: AP chest radiograph 10/28/2024. CT chest 6/25/2021    Technique: Axial CT images were obtained of the chest after the uneventful intravenous administration of iodinated contrast utilizing pulmonary embolism protocol.  Sagittal and coronal reconstructions were performed.  Automated exposure control and   iterative reconstruction methods were used.      Findings:  No pulmonary embolism. Heart size is  borderline enlarged. Mitral annulus calcifications are present. Aortic valve calcifications are present. Heavy calcific atherosclerosis is seen within the coronary arteries. There is mild thoracic aortic calcific   atherosclerosis. No thoracic aortic aneurysm or dissection is seen.    Dense consolidations are present within the bilateral lower lobes with volume loss. There is partial atelectasis in the right middle lobe. There is elevation of right hemidiaphragm. Benign calcified nodule in the left lung.    Benign calcified mediastinal lymph nodes are present. There is mild fluid distention of the mid to upper thoracic esophagus. There is some layering secretions within the upper thoracic trachea.    Cirrhotic liver morphology is demonstrated. Cholecystectomy changes are present. Calcified granulomatous changes are present in the liver and spleen. Heavy calcific atherosclerosis is demonstrated the origins of the celiac artery and SMA as well as the   bilateral renal artery origins. A cyst is seen in the right upper renal pole. Pancreas appears moderately atrophic. Remainder of the imaged upper abdominal organs appear within the limits.    Severe degenerative changes of the shoulders. Moderate degenerative changes of the thoracic spine. Advanced degenerative disc and endplate changes at L1-2. No acute osseous abnormality. Lumbar levoscoliosis.      Impression:      Impression:    1. No pulmonary embolism.  2. Dense consolidations within the bilateral lower lobes. Correlate for pneumonia or sequela of aspiration.  3. Partial right middle lobe atelectasis may be related to elevation the right hemidiaphragm.  4. Benign calcified granulomatous changes in the chest.  5. Cirrhotic liver morphology. Correlate with known history.  6. Additional chronic findings as described above.        Electronically Signed: Julia Sanders MD    10/28/2024 5:06 PM EDT    Workstation ID: [id number]    XR Chest 1 View [[id number]] Collected:  10/28/24 1427     Updated: 10/28/24 1430    Narrative:      XR CHEST 1 VW    Date of Exam: 10/28/2024 2:09 PM EDT    Indication: CHF/COPD Protocol  CHF/COPD Protocol    Comparison: AP chest    12/16/2023. CT chest 6/25/2021    Findings:  Low volume inspiration. Moderate right hemidiaphragm elevation USP of the hemithorax, with passive right lung atelectasis. Left lung appears clear. Benign calcified granuloma in the left midlung. Heart size is mildly enlarged but stable. Mitral   annulus calcifications are noted. Advanced degenerative changes of both shoulders with suspected calcified loose bodies in each shoulder joint. Osteopenic changes are present. No acute osseous abnormalities.      Impression:      Impression:    1. No acute chest findings.  2. Appears stable moderate right hemidiaphragm elevation with passive right lung atelectasis.      Electronically Signed: Julia Sanders MD    10/28/2024 2:28 PM EDT    Workstation ID: [id number]              Current Meds:   SCHEDULE  amLODIPine, 10 mg, Oral, Daily  aspirin, 81 mg, Oral, Daily  budesonide, 0.5 mg, Nebulization, BID - RT  buPROPion, 75 mg, Oral, Daily  carvedilol, 12.5 mg, Oral, BID With Meals  [Held by provider] clonazePAM, 0.5 mg, Oral, Nightly  dicyclomine, 10 mg, Oral, Q6H  enoxaparin, 40 mg, Subcutaneous, Q24H  [Held by provider] gabapentin, 100 mg, Oral, TID  [Held by provider] hydroCHLOROthiazide, 25 mg, Oral, Daily  insulin lispro, 2-7 Units, Subcutaneous, 4x Daily AC & at Bedtime  ipratropium-albuterol, 3 mL, Nebulization, Q4H - RT  levothyroxine, 25 mcg, Oral, Q AM  methylPREDNISolone sodium succinate, 40 mg, Intravenous, Q12H  miconazole, , Topical, BID  mirtazapine, 7.5 mg, Oral, Nightly  muscle rub, 1 Application, Topical, BID  pantoprazole, 40 mg, Oral, Q AM  sodium chloride, 10 mL, Intravenous, Q12H  ursodiol, 500 mg, Oral, BID  venlafaxine XR, 150 mg, Oral, Daily      Infusions     PRNs    acetaminophen    senna-docusate sodium **AND**  polyethylene glycol **AND** bisacodyl **AND** bisacodyl    Calcium Replacement - Follow Nurse / BPA Driven Protocol    dextrose    dextrose    diphenoxylate-atropine    glucagon (human recombinant)    Magnesium Standard Dose Replacement - Follow Nurse / BPA Driven Protocol    nitroglycerin    Phosphorus Replacement - Follow Nurse / BPA Driven Protocol    Potassium Replacement - Follow Nurse / BPA Driven Protocol    sodium chloride    sodium chloride        Summer Henry MD  10/29/2024  08:12 EDT      Much of this encounter note is an electronic transcription/translation of spoken language to printed text using Dragon Software.

## 2024-10-29 NOTE — PLAN OF CARE
Goal Outcome Evaluation:      Pt able to get some rested throughout shift. Pt remains on Airvo 35/60 maintinaing O2 sats >90. Pt remains confused, which is at baseline. NPO at this time. All questions and concerns addressed.

## 2024-10-29 NOTE — PROGRESS NOTES
"Pharmacy Antimicrobial Dosing Service    Subjective:  Nadya Hurtado is a 83 y.o.female admitted with shortness of breath. Pharmacy has been consulted to dose Vancomycin for possible bacteremia.    PMH: n/a      Assessment/Plan    1. Day #1 Vancomycin: Goal -600 mcg*h/mL.   Patient to receive a loading dose of vancomycin 1750 mg IV (~22.5 mg/kg ABW).  Scr stable.   Will start on a maintenance dose of vancomycin 1000 mg IV q12h (~ 12.9 mg/kg ABW), giving a predicted steady state AUC of 541 mg/L*hr.  Vancomycin level scheduled for 10/31 AM, prior to the 4th dose.    2. Day #2 Ceftriaxone: 2 g IV q24h for PNA.    Will continue to monitor drug levels, renal function, culture and sensitivities, and patient clinical status.     Objective:  Relevant clinical data and objective history reviewed:  170.2 cm (67.01\")   77.7 kg (171 lb 4.8 oz)   Ideal body weight: 61.6 kg (135 lb 13.5 oz)  Adjusted ideal body weight: 68.1 kg (150 lb 0.4 oz)  Body mass index is 26.82 kg/m².        Results from last 7 days   Lab Units 10/29/24  0212 10/28/24  1325   CREATININE mg/dL 0.57 0.69     Estimated Creatinine Clearance: 78.9 mL/min (by C-G formula based on SCr of 0.57 mg/dL).  I/O last 3 completed shifts:  In: -   Out: 400 [Urine:400]    Results from last 7 days   Lab Units 10/29/24  0212 10/28/24  1325   WBC 10*3/mm3 15.06* 15.81*     Temperature    10/29/24 0332 10/29/24 0929 10/29/24 1148   Temp: 98.4 °F (36.9 °C) 97.3 °F (36.3 °C) 98.1 °F (36.7 °C)     Baseline culture/source/susceptibility:  Microbiology Results (last 10 days)       Procedure Component Value - Date/Time    Blood Culture - Blood, Arm, Left [[id number]]  (Abnormal) Collected: 10/28/24 1410    Lab Status: Preliminary result Specimen: Blood from Arm, Left Updated: 10/29/24 1535     Blood Culture Abnormal Stain     Gram Stain Aerobic Bottle Gram positive cocci in clusters    Blood Culture - Blood, Arm, Right [[id number]]  (Abnormal) Collected: 10/28/24 1353    Lab " Status: Preliminary result Specimen: Blood from Arm, Right Updated: 10/29/24 1420     Blood Culture Abnormal Stain     Gram Stain Aerobic Bottle Gram positive cocci in clusters      Anaerobic Bottle Gram positive cocci in clusters    Blood Culture ID, PCR - Blood, Arm, Right [[id number]]  (Abnormal) Collected: 10/28/24 1353    Lab Status: Final result Specimen: Blood from Arm, Right Updated: 10/29/24 1420     BCID, PCR Staph spp, not aureus or lugdunensis. Identification by BCID2 PCR.     BOTTLE TYPE Anaerobic Bottle    Respiratory Panel PCR w/COVID-19(SARS-CoV-2) CHEI[facility]/TOMAS/DARÍO/PAD/COR/DANAE In-House, NP Swab in UTM/VTM, 2 HR TAT - Swab, Nasopharynx [[id number]]  (Normal) Collected: 10/28/24 1331    Lab Status: Final result Specimen: Swab from Nasopharynx Updated: 10/28/24 1451     ADENOVIRUS, PCR Not Detected     Coronavirus 229E Not Detected     Coronavirus HKU1 Not Detected     Coronavirus NL63 Not Detected     Coronavirus OC43 Not Detected     COVID19 Not Detected     Human Metapneumovirus Not Detected     Human Rhinovirus/Enterovirus Not Detected     Influenza A PCR Not Detected     Influenza B PCR Not Detected     Parainfluenza Virus 1 Not Detected     Parainfluenza Virus 2 Not Detected     Parainfluenza Virus 3 Not Detected     Parainfluenza Virus 4 Not Detected     RSV, PCR Not Detected     Bordetella pertussis pcr Not Detected     Bordetella parapertussis PCR Not Detected     Chlamydophila pneumoniae PCR Not Detected     Mycoplasma pneumo by PCR Not Detected    Narrative:      In the setting of a positive respiratory panel with a viral infection PLUS a negative procalcitonin without other underlying concern for bacterial infection, consider observing off antibiotics or discontinuation of antibiotics and continue supportive care. If the respiratory panel is positive for atypical bacterial infection (Bordetella pertussis, Chlamydophila pneumoniae, or Mycoplasma pneumoniae), consider antibiotic de-escalation to  target atypical bacterial infection.          Casey Lugo [facility]  10/29/24 15:57 EDT

## 2024-10-29 NOTE — SIGNIFICANT NOTE
10/29/24 1358   Rehab Time/Intention   Session Not Performed unable to evaluate, medical status change  (very lethargic, not able to participate)   Recommendation   PT - Next Appointment 10/30/24

## 2024-10-29 NOTE — PROGRESS NOTES
[facility] MEDICINE SERVICE  DAILY PROGRESS NOTE    NAME: Nadya Hurtado  : 1940  MRN: [id number]      LOS: 1 day     PROVIDER OF SERVICE: Tess Hathaway MD    Chief Complaint: Shortness of breath    Subjective:     Interval History:    Patient seen and evaluated at bedside.   H&P, labs and imaging reviewed.  Patient asleep did not wake up.       Review of Systems:   Unable to obtain as patient was asleep  Objective:     Vital Signs  Temp:  [97.8 °F (36.6 °C)-98.7 °F (37.1 °C)] 98.4 °F (36.9 °C)  Heart Rate:  [64-89] 82  Resp:  [16-22] 18  BP: (103-138)/(47-66) 116/65  Flow (L/min) (Oxygen Therapy):  [3-35] 35   Body mass index is 26.82 kg/m².    Physical Exam   General: No acute distress, appears stated age  Neuro: Asleep, no focal deficits appreciated  Head: atraumatic, normocephalic  HEENT: EOMI, anicteric, normal sclera and conjunctivae, moist mucus membranes  Neck: supple, no lymphadenopathy  CV: RRR, soft heart sounds, no murmurs appreciated, no peripheral edema  Pulm: Decreased breath sounds, no increased work of breathing, no adventitious sounds  Abd: Soft, nontender, nondistended  Skin: Warm, dry and intact  Psych: Asleep    Scheduled Meds   amLODIPine, 10 mg, Oral, Daily  aspirin, 81 mg, Oral, Daily  budesonide, 0.5 mg, Nebulization, BID - RT  buPROPion, 75 mg, Oral, Daily  carvedilol, 12.5 mg, Oral, BID With Meals  [Held by provider] clonazePAM, 0.5 mg, Oral, Nightly  dicyclomine, 10 mg, Oral, Q6H  enoxaparin, 40 mg, Subcutaneous, Q24H  [Held by provider] gabapentin, 100 mg, Oral, TID  [Held by provider] hydroCHLOROthiazide, 25 mg, Oral, Daily  insulin lispro, 2-7 Units, Subcutaneous, 4x Daily AC & at Bedtime  ipratropium-albuterol, 3 mL, Nebulization, Q4H - RT  levothyroxine, 25 mcg, Oral, Q AM  methylPREDNISolone sodium succinate, 40 mg, Intravenous, Q12H  miconazole, , Topical, BID  mirtazapine, 7.5 mg, Oral, Nightly  muscle rub, 1 Application, Topical, BID  pantoprazole, 40 mg, Oral, Q  AM  sodium chloride, 10 mL, Intravenous, Q12H  ursodiol, 500 mg, Oral, BID  venlafaxine XR, 150 mg, Oral, Daily       PRN Meds     acetaminophen    senna-docusate sodium **AND** polyethylene glycol **AND** bisacodyl **AND** bisacodyl    Calcium Replacement - Follow Nurse / BPA Driven Protocol    dextrose    dextrose    diphenoxylate-atropine    glucagon (human recombinant)    Magnesium Standard Dose Replacement - Follow Nurse / BPA Driven Protocol    nitroglycerin    Phosphorus Replacement - Follow Nurse / BPA Driven Protocol    Potassium Replacement - Follow Nurse / BPA Driven Protocol    sodium chloride    sodium chloride   Infusions         Diagnostic Data    Results from last 7 days   Lab Units 10/29/24  0212   WBC 10*3/mm3 15.06*   HEMOGLOBIN g/dL 11.8*   HEMATOCRIT % 35.8   PLATELETS 10*3/mm3 270   GLUCOSE mg/dL 159*   CREATININE mg/dL 0.57   BUN mg/dL 19   SODIUM mmol/L 136   POTASSIUM mmol/L 4.0   AST (SGOT) U/L 11   ALT (SGPT) U/L <5   ALK PHOS U/L 70   BILIRUBIN mg/dL 0.4   ANION GAP mmol/L 11.7       CT Abdomen Pelvis Without Contrast    Result Date: 10/28/2024  Lower lobe atelectasis involving both lungs. Electronically Signed: Tonny Chen MD  10/28/2024 6:22 PM EDT  Workstation ID: [id number]    CT Angiogram Chest Pulmonary Embolism    Result Date: 10/28/2024  Impression: 1. No pulmonary embolism. 2. Dense consolidations within the bilateral lower lobes. Correlate for pneumonia or sequela of aspiration. 3. Partial right middle lobe atelectasis may be related to elevation the right hemidiaphragm. 4. Benign calcified granulomatous changes in the chest. 5. Cirrhotic liver morphology. Correlate with known history. 6. Additional chronic findings as described above. Electronically Signed: Julia Sanders MD  10/28/2024 5:06 PM EDT  Workstation ID: [id number]    XR Chest 1 View    Result Date: 10/28/2024  Impression: 1. No acute chest findings. 2. Appears stable moderate right hemidiaphragm elevation with passive  right lung atelectasis. Electronically Signed: Julia Sanders MD  10/28/2024 2:28 PM EDT  Workstation ID: [id number]     Interval results reviewed.    Assessment/Plan:   Acute hypoxic hypercapnic respiratory failure  Acute CHF exacerbation  Community-acquired pneumonia bilateral  UTI  Bilateral lower lobe atelectasis  Hypertension  Diabetes  GERD  Hypothyroidism  Major depression    Patient on Airvo, will consult pulmonary.  White blood count about the same.  Blood cultures pending.  Continue ceftriaxone and check procalcitonin  Start patient on Lasix 20 mg IV twice daily.  Strict intake output Daily weights and less than 2 g salt diet  Check urine culture  2D echo pending  Hold amlodipine for now.  Treatment plan discussed with RN.     VTE Prophylaxis:  Pharmacologic VTE prophylaxis orders are present.         Code status is   Code Status and Medical Interventions: No CPR (Do Not Attempt to Resuscitate); Limited Support; No intubation (DNI)   Ordered at: 10/28/24 2203     Medical Intervention Limits:    No intubation (DNI)     Code Status (Patient has no pulse and is not breathing):    No CPR (Do Not Attempt to Resuscitate)     Medical Interventions (Patient has pulse or is breathing):    Limited Support       Plan for disposition:     Barriers to Discharge: On IV antibiotics  Anticipated Date of Discharge: 10/31/24  Place of Discharge: Home      Time: 40 minutes     Signature: Electronically signed by Tess Hathaway MD, 10/29/24, 08:21 EDT.  [facility] Hospitalist Team

## 2024-10-30 PROBLEM — Z51.5 END OF LIFE CARE: Status: ACTIVE | Noted: 2024-01-01

## 2024-10-30 NOTE — THERAPY EVALUATION
Acute Care - Speech Language Pathology   Swallow Initial Evaluation  [facility]     Patient Name: Nadya Hurtado  : 1940  MRN: [id number]  Today's Date: 10/30/2024               Admit Date: 10/28/2024    Visit Dx:     ICD-10-CM ICD-9-CM   1. Dyspnea, unspecified type  R06.00 786.09   2. Hypoxia  R09.02 799.02   3. Congestive heart failure, unspecified HF chronicity, unspecified heart failure type  I50.9 428.0   4. Pneumonia due to infectious organism, unspecified laterality, unspecified part of lung  J18.9 486     Patient Active Problem List   Diagnosis    Hypokalemia    UTI (urinary tract infection)    Acute metabolic encephalopathy    Dehydration    Acute right-sided weakness    Acute UTI    Acute respiratory failure with hypoxia    Primary hypertension    HLD (hyperlipidemia)    IBS (irritable bowel syndrome)    GERD without esophagitis    Reactive depression    Shortness of breath     Past Medical History:   Diagnosis Date    Arthritis     CHF (congestive heart failure)     Diabetes mellitus     Disease of thyroid gland     Esophageal spasm     Gastritis     GERD (gastroesophageal reflux disease)     Hyperlipidemia     Hypertension     IBS (irritable bowel syndrome)     Sleep apnea      Past Surgical History:   Procedure Laterality Date    BLADDER SUSPENSION      CARDIAC CATHETERIZATION      CHOLECYSTECTOMY      HYSTERECTOMY         SLP Recommendation and Plan  SLP Swallowing Diagnosis: R/O pharyngeal dysphagia (10/30/24 1500)  SLP Diet Recommendation: NPO, other (see comments) (Ice chips/water sips by spoon only as per the [place] Water Protocol (see note)) (10/30/24 1500)     SLP Rec. for Method of Medication Administration: meds via alternate route (10/30/24 1500)     Monitor for Signs of Aspiration: notify SLP if any concerns (10/30/24 1500)  Recommended Diagnostics: reassess via clinical swallow evaluation, reassess via VFSS (MBS) (10/30/24 1500)  Swallow Criteria for Skilled Therapeutic Interventions  Met: demonstrates skilled criteria (10/30/24 1500)  Anticipated Discharge Disposition (SLP): skilled nursing facility (10/30/24 1500)  Rehab Potential/Prognosis, Swallowing: adequate, monitor progress closely (10/30/24 1500)  Therapy Frequency (Swallow): 4 days per week, 5 days per week (10/30/24 1500)  Predicted Duration Therapy Intervention (Days): until discharge (10/30/24 1500)  Oral Care Recommendations: Oral Care BID/PRN, Other (see comments) (Ice chips/water sips, frequent oral care) (10/30/24 1500)             SWALLOW EVALUATION (Last 72 Hours)       SLP Adult Swallow Evaluation       Row Name 10/30/24 1500       Rehab Evaluation    Document Type evaluation  -SM    Subjective Information no complaints  -SM    Patient Observations alert;cooperative;agree to therapy  Confused  -SM    Patient/Family/Caregiver Comments/Observations Patient's daughter present  -SM    Patient Effort good  -SM    Comment The patient was seen for an initial clinical swallow evaluation this date due to concerns for possible aspiration  -SM    Oral Care swabbed with sterile water  -SM       General Information    Patient Profile Reviewed yes  -SM    Pertinent History Of Current Problem Per H&P: Nadya Hurtado is a 83 y.o. female with a CMH of dementia, CHF, HTN, HLD, GERD who presented to [facility] on 10/28/2024 with shortness of breath.  Patient is a resident at [facility] patient developed acute onset of shortness of breath, requiring oxygen supplementation.  Patient does have some altered mental status/disorientation, however it is at patient's baseline.  Patient is typically on room.     While in the emergency department patient is requiring 5 L of oxygen.  Chest x-ray was negative, CT PE protocol pending, labs were remarkable for: Troponin 17, BNP 2654, sodium 134, chloride 92, CO2 33.2.  WBC 15.81, glucose.  Urine positive for nitrites, however 0-2 WBCs     XR CHEST 1 VW     Date of Exam: 10/28/2024 2:09 PM  EDT     Indication: CHF/COPD Protocol  CHF/COPD Protocol     Comparison: AP chest     12/16/2023. CT chest 6/25/2021     Findings:  Low volume inspiration. Moderate right hemidiaphragm elevation assisted of the hemithorax, with passive right lung atelectasis. Left lung appears clear. Benign calcified granuloma in the left midlung. Heart size is mildly enlarged but stable. Mitral   annulus calcifications are noted. Advanced degenerative changes of both shoulders with suspected calcified loose bodies in each shoulder joint. Osteopenic changes are present. No acute osseous abnormalities.     IMPRESSION:  Impression:     1. No acute chest findings.  2. Appears stable moderate right hemidiaphragm elevation with passive right lung atelectasis          CT ANGIOGRAM CHEST PULMONARY EMBOLISM     Date of Exam: 10/28/2024 4:55 PM EDT     Indication: shortness of breath.     Comparison: AP chest radiograph 10/28/2024. CT chest 6/25/2021     Technique: Axial CT images were obtained of the chest after the uneventful intravenous administration of iodinated contrast utilizing pulmonary embolism protocol.  Sagittal and coronal reconstructions were performed.  Automated exposure control and   iterative reconstruction methods were used.        Findings:  No pulmonary embolism. Heart size is borderline enlarged. Mitral annulus calcifications are present. Aortic valve calcifications are present. Heavy calcific atherosclerosis is seen within the coronary arteries. There is mild thoracic aortic calcific   atherosclerosis. No thoracic aortic aneurysm or dissection is seen.     Dense consolidations are present within the bilateral lower lobes with volume loss. There is partial atelectasis in the right middle lobe. There is elevation of right hemidiaphragm. Benign calcified nodule in the left lung.     Benign calcified mediastinal lymph nodes are present. There is mild fluid distention of the mid to upper thoracic esophagus. There is some  layering secretions within the upper thoracic trachea.     Cirrhotic liver morphology is demonstrated. Cholecystectomy changes are present. Calcified granulomatous changes are present in the liver and spleen. Heavy calcific atherosclerosis is demonstrated the origins of the celiac artery and SMA as well as the   bilateral renal artery origins. A cyst is seen in the right upper renal pole. Pancreas appears moderately atrophic. Remainder of the imaged upper abdominal organs appear within the limits.     Severe degenerative changes of the shoulders. Moderate degenerative changes of the thoracic spine. Advanced degenerative disc and endplate changes at L1-2. No acute osseous abnormality. Lumbar levoscoliosis.     IMPRESSION:  Impression:     1. No pulmonary embolism.  2. Dense consolidations within the bilateral lower lobes. Correlate for pneumonia or sequela of aspiration.  3. Partial right middle lobe atelectasis may be related to elevation the right hemidiaphragm.  4. Benign calcified granulomatous changes in the chest.  5. Cirrhotic liver morphology. Correlate with known history.  6. Additional chronic findings as described above    -SM    Current Method of Nutrition NPO  -SM    Precautions/Limitations, Vision WFL;for purposes of eval  -SM    Precautions/Limitations, Hearing WFL;for purposes of eval  -SM    Prior Level of Function-Communication unknown  -SM    Prior Level of Function-Swallowing unknown;other (see comments)  Prior swallow evaluation Nov, 2022 with a mechanical soft/thin liquid diet recommended at that time.  -SM    Plans/Goals Discussed with patient and family  -SM    Barriers to Rehab medically complex  -SM       Oral Motor Structure and Function    Dentition Assessment natural, present and adequate;missing teeth  -SM    Secretion Management dried secretions in oral cavity  -SM    Mucosal Quality dry;sticky  -SM       Oral Musculature and Cranial Nerve Assessment    Oral Motor General Assessment  generalized oral motor weakness;oral labial or buccal impairment;lingual impairment  -SM    Oral Motor, Comment The patient is able to protrude her tongue upon request. Slight deviation to the left noted. She is able to lateralize tongue to the right and left, however decreased range and cooridination noted. The patient was unable to elevate tongue to the alveolar ridge even when provided max cues. Labial protrusion/retraction weak/reduced bilaterally, more pronounced on the left. Dentition are natural. She appears to be missing 1 front lower tooth, and possibly additional posterior/back teeth. She does not have or wear dentures. Oral production revealed good intelligibility, however she was noted to be very confused. Coughing at baseline noted  -SM       General Eating/Swallowing Observations    Respiratory Support Currently in Use high-flow nasal cannula;other (see comments)  Airvo  -SM    O2 Liters other (see comments)  35 L at 50% 02  -SM    Eating/Swallowing Skills fed by SLP  -SM    Positioning During Eating upright 90 degree;upright in bed  -SM    Utensils Used spoon  -SM    Consistencies Trialed ice chips;thin liquids  -SM       Clinical Swallow Eval    Clinical Swallow Evaluation Summary The patient was seen today for a clinical swallow evaluation due to concerns for possible aspiration Pneumonia. Upon entrance to the patient's room she is seated up in bed, awake and alert, however very confused. Her daughter just arrived and the patient was unable to identify her daughter initially. She is oriented to person. Oral motor examination completed (see above). At rest the patient presented with repetitive and disorganized oral movements of the tongue and mandible (possibly tartive dyskinetic movements?). She did answer some simple questions and follow simple commands. Head of bed elevated to approximately 90 degrees with mild objection from the patient. She is currently receiving 35 L 02 via nasal canula (Airvo)  at 50% oxygen (down from 60% earlier per RT) and was sating in the low 90's for the duration of the session. Coughing prior to being given any po noted. She was given several trials of single ice chips and small sips of thin water only by clinician. Labial seal is adequate. Oral manipulation good, with no anterior labial spillage. Throat clearing/coughing noted following approximately 50% of all trials assessed this date. Based upon these results, current respiratory status, and suspicion of possible aspiration pneumonia, other consistencies not assessed at this time.    RECOMMENDATIONS:  It is recommended that the patient remain NPO with the exception of ice chips/water sips by SPOON only, as per the [place] Water Protocol (see below)  Frequent oral care  Up at 90 degree angle for all ice chips/water sips  ST will continue to follow for re-evaluation of swallow (including re-evaluation clinically and via instrumental assessment of swallow if indicated) as medical and respiratory status improve.  Additional goals/recommendations to follow.     The rationale to recommend water when a PO diet cannot appropriately/functionally sustain nutrition is because water is low risk for aspiration pna when compared to aspiration of food or other liquids.      Benefits of a water protocol include but are not limited to:     Oral gratification   Engagement of oropharyngeal swallow musculature   Decrease likelihood of dehydration      Guidelines for proper implementation include:  Thorough oral care prior to consuming water  Upright at 90 degree hip flexion  Small sips at slow rate    Monitor for any changes in respiratory status and discontinue if distress noted    The [place] Free Water Protocol is a research based protocol established in 1984.       -SM       SLP Evaluation Clinical Impression    SLP Swallowing Diagnosis R/O pharyngeal dysphagia  -SM    Functional Impact risk of aspiration/pneumonia;risk of malnutrition;risk of  dehydration  -    Rehab Potential/Prognosis, Swallowing adequate, monitor progress closely  -    Swallow Criteria for Skilled Therapeutic Interventions Met demonstrates skilled criteria  -       Recommendations    Therapy Frequency (Swallow) 4 days per week;5 days per week  -    Predicted Duration Therapy Intervention (Days) until discharge  -    SLP Diet Recommendation NPO;other (see comments)  Ice chips/water sips by spoon only as per the [place] Water Protocol (see note)  -    Recommended Diagnostics reassess via clinical swallow evaluation;[place] [place]  -    Oral Care Recommendations Oral Care BID/PRN;Other (see comments)  Ice chips/water sips, frequent oral care  -    SLP Rec. for Method of Medication Administration meds via alternate route  -    Monitor for Signs of Aspiration notify SLP if any concerns  -    Anticipated Discharge Disposition (SLP) skilled nursing facility  -       Swallow Goals (SLP)    Swallow LTGs Swallow Long Term Goal (free text)  -    Swallow STGs diet tolerance goal selection (SLP)  -    Diet Tolerance Goal Selection (SLP) Swallow Short Term Goal 1;Swallow Short Term Goal 2  -SM       (LTG) Swallow    (LTG) Swallow The patient will maximize swallow function for return to baseline and/or least restrictive po diet, exhibiting no complications associated with dysphagia, adequate po intake, and demonstrating independent use of safe swallow compensations.  -SM    [place] (Swallow Long Term Goal) with moderate cues (50-74% accuracy)  -    Time Frame (Swallow Long Term Goal) by discharge  -    Progress/Outcomes (Swallow Long Term Goal) new goal  -SM       (STG) Swallow 1    (STG) Swallow 1 The patient will participate in ongoing assessment of swallow to determine candidacy to initiate a po diet and/or ability to participate in an instrumental assessment of swallow  -SM    [place] (Swallow Short Term Goal 1) with maximum cues (25-49% accuracy)   -SM    Time Frame (Swallow Short Term Goal 1) other (see comments)  24 - 48 hours  -SM    Progress/Outcomes (Swallow Short Term Goal 1) new goal  -SM       (STG) Swallow 2    (STG) Swallow 2 Patient/caregiver teaching/additional goals/recommendations to follow  -SM    [place] (Swallow Short Term Goal 2) with maximum cues (25-49% accuracy)  -SM    Time Frame (Swallow Short Term Goal 2) 1 week  -SM    Progress/Outcomes (Swallow Short Term Goal 2) new goal  -SM              User Key  (r) = Recorded By, (t) = Taken By, (c) = Cosigned By      Initials Name Effective Dates    La Garcias, SLP 06/16/21 -                     EDUCATION  The patient has been educated in the following areas:   Dysphagia (Swallowing Impairment) Oral Care/Hydration NPO rationale.        SLP GOALS       Row Name 10/30/24 1500       (LTG) Swallow    (LTG) Swallow The patient will maximize swallow function for return to baseline and/or least restrictive po diet, exhibiting no complications associated with dysphagia, adequate po intake, and demonstrating independent use of safe swallow compensations.  -SM    [place] (Swallow Long Term Goal) with moderate cues (50-74% accuracy)  -SM    Time Frame (Swallow Long Term Goal) by discharge  -SM    Progress/Outcomes (Swallow Long Term Goal) new goal  -SM       (STG) Swallow 1    (STG) Swallow 1 The patient will participate in ongoing assessment of swallow to determine candidacy to initiate a po diet and/or ability to participate in an instrumental assessment of swallow  -SM    [place] (Swallow Short Term Goal 1) with maximum cues (25-49% accuracy)  -SM    Time Frame (Swallow Short Term Goal 1) other (see comments)  24 - 48 hours  -SM    Progress/Outcomes (Swallow Short Term Goal 1) new goal  -SM       (STG) Swallow 2    (STG) Swallow 2 Patient/caregiver teaching/additional goals/recommendations to follow  -SM    [place] (Swallow Short Term Goal 2) with maximum cues (25-49%  accuracy)  -SM    Time Frame (Swallow Short Term Goal 2) 1 week  -SM    Progress/Outcomes (Swallow Short Term Goal 2) new goal  -SM              User Key  (r) = Recorded By, (t) = Taken By, (c) = Cosigned By      Initials Name Provider Type    La Garcias, SLP Speech and Language Pathologist                         Time Calculation:                JOSE Can  10/30/2024

## 2024-10-30 NOTE — PLAN OF CARE
Problem: Adult Inpatient Plan of Care  Goal: Absence of Hospital-Acquired Illness or Injury  Intervention: Identify and Manage Fall Risk  Recent Flowsheet Documentation  Taken 10/30/2024 1605 by Rebekah Nelson RN  Safety Promotion/Fall Prevention:   assistive device/personal items within reach   clutter free environment maintained   fall prevention program maintained   lighting adjusted   nonskid shoes/slippers when out of bed   room organization consistent   safety round/check completed  Taken 10/30/2024 1420 by Rebekah Nelson RN  Safety Promotion/Fall Prevention:   assistive device/personal items within reach   clutter free environment maintained   fall prevention program maintained   lighting adjusted   nonskid shoes/slippers when out of bed   room organization consistent   safety round/check completed  Taken 10/30/2024 1213 by Rebekah Nelson RN  Safety Promotion/Fall Prevention:   assistive device/personal items within reach   clutter free environment maintained   fall prevention program maintained   lighting adjusted   nonskid shoes/slippers when out of bed   room organization consistent   safety round/check completed  Taken 10/30/2024 1016 by Rebekah Nelson RN  Safety Promotion/Fall Prevention:   assistive device/personal items within reach   clutter free environment maintained   fall prevention program maintained   lighting adjusted   nonskid shoes/slippers when out of bed   room organization consistent   safety round/check completed  Taken 10/30/2024 0833 by Rebekah Nelson RN  Safety Promotion/Fall Prevention:   assistive device/personal items within reach   clutter free environment maintained   fall prevention program maintained   lighting adjusted   nonskid shoes/slippers when out of bed   room organization consistent   safety round/check completed  Intervention: Prevent Skin Injury  Recent Flowsheet Documentation  Taken 10/30/2024 1605 by Rebekah Nelson, RN  Body Position:   turned   left  Taken 10/30/2024  1420 by Rebekah Nelson RN  Body Position:   turned   left  Taken 10/30/2024 1213 by Rebekah Nelson RN  Body Position: weight shifting  Taken 10/30/2024 1016 by Rebekah Nelson RN  Body Position:   turned   right  Taken 10/30/2024 0833 by Rebekah Nelson RN  Body Position:   turned   right  Skin Protection: incontinence pads utilized  Intervention: Prevent Infection  Recent Flowsheet Documentation  Taken 10/30/2024 1605 by Rebekah Nelson RN  Infection Prevention:   hand hygiene promoted   personal protective equipment utilized  Taken 10/30/2024 1420 by Rebekah Nelson RN  Infection Prevention:   hand hygiene promoted   personal protective equipment utilized  Taken 10/30/2024 1213 by Rebekah Nelson RN  Infection Prevention:   hand hygiene promoted   personal protective equipment utilized  Taken 10/30/2024 1016 by Rebekah Nelson RN  Infection Prevention:   hand hygiene promoted   personal protective equipment utilized  Taken 10/30/2024 0833 by Rebekah Nelson RN  Infection Prevention:   hand hygiene promoted   personal protective equipment utilized  Goal: Optimal Comfort and Wellbeing  Intervention: Provide Person-Centered Care  Recent Flowsheet Documentation  Taken 10/30/2024 0833 by Rebekah Nelson RN  Trust Relationship/Rapport: care explained   Goal Outcome Evaluation:     Daughter at bedside. [facility] at bedside to discuss advance care planning. By request of patient and daughter. Pt still on Airvo. Potassium replaced this shift. Family to remain at bedside. Plan of care to continue.

## 2024-10-30 NOTE — PLAN OF CARE
Goal Outcome Evaluation:   The patient was seen today for a clinical swallow evaluation due to concerns for possible aspiration Pneumonia. Upon entrance to the patient's room she is seated up in bed, awake and alert, however very confused. Her daughter just arrived and the patient was unable to identify her daughter initially. She is oriented to person. Oral motor examination completed (see above). At rest the patient presented with repetitive and disorganized oral movements of the tongue and mandible (possibly tartive dyskinetic movements?). She did answer some simple questions and follow simple commands. Head of bed elevated to approximately 90 degrees with mild objection from the patient. She is currently receiving 35 L 02 via nasal canula (Airvo) at 50% oxygen (down from 60% earlier per RT) and was sating in the low 90's for the duration of the session. Coughing prior to being given any po noted. She was given several trials of single ice chips and small sips of thin water only by clinician. Labial seal is adequate. Oral manipulation good, with no anterior labial spillage. Throat clearing/coughing noted following approximately 50% of all trials assessed this date. Based upon these results, current respiratory status, and suspicion of possible aspiration pneumonia, other consistencies not assessed at this time.    RECOMMENDATIONS:  It is recommended that the patient remain NPO with the exception of ice chips/water sips by SPOON only, as per the [place] Water Protocol (see below)  Frequent oral care  Up at 90 degree angle for all ice chips/water sips  ST will continue to follow for re-evaluation of swallow (including re-evaluation clinically and via instrumental assessment of swallow if indicated) as medical and respiratory status improve.  Additional goals/recommendations to follow.     The rationale to recommend water when a PO diet cannot appropriately/functionally sustain nutrition is because water is low risk  for aspiration pna when compared to aspiration of food or other liquids.      Benefits of a water protocol include but are not limited to:     Oral gratification   Engagement of oropharyngeal swallow musculature   Decrease likelihood of dehydration      Guidelines for proper implementation include:  Thorough oral care prior to consuming water  Upright at 90 degree hip flexion  Small sips at slow rate    Monitor for any changes in respiratory status and discontinue if distress noted    The [place] Free Water Protocol is a research based protocol established in 1984.                 Anticipated Discharge Disposition (SLP): skilled nursing facility          SLP Swallowing Diagnosis: R/O pharyngeal dysphagia (10/30/24 1500)

## 2024-10-30 NOTE — NURSING NOTE
Family met with Cathie from Hospice. This RN reached out to MD for dc/readmit orders for GIP. And comfort meds. Awaiting orders at this time. Family to remain at bedside.  Hospice RN present on unit awaiting to transfer patient to [facility] at this time.

## 2024-10-30 NOTE — CONSULTS
Infectious Diseases Consult Note    Referring Provider: Tess Hathaway MD    Reason for Consultation: Positive blood culture    Patient Care Team:  Maximino Freedman MD as PCP - General (Internal Medicine)    Chief complaint shortness of breath, confused    Subjective     History of present illness:      This is a 83-year-old female who presents to the hospital on 10/20/2024 from her extended care facility due to mental status change and shortness of breath.  Patient is a relatively poor historian and there is no family at bedside.  She cannot tell me how long she has been short of breath or she has had a productive cough but states she does not normally wear oxygen.  Denies fever or chills and denies any other specific symptoms.  Patient denies coming in with a line, port, having a cardiac valve surgery or cardiac device placement.    Review of Systems   Review of Systems   Unable to perform ROS: Dementia   Respiratory:  Positive for shortness of breath.        Medications  Medications Prior to Admission   Medication Sig Dispense Refill Last Dose/Taking    acetaminophen (TYLENOL) 325 MG tablet Take 2 tablets by mouth 2 (Two) Times a Day.   Taking    amLODIPine (NORVASC) 10 MG tablet Take 1 tablet by mouth Daily.   Taking    aspirin 81 MG chewable tablet Chew 1 tablet Daily. 30 tablet 0 Taking    buPROPion (WELLBUTRIN) 75 MG tablet Take 1 tablet by mouth Daily.   Taking    carvedilol (COREG) 12.5 MG tablet Take 1 tablet by mouth 2 (Two) Times a Day With Meals.   Taking    clonazePAM (KlonoPIN) 0.5 MG tablet Take 1 tablet by mouth Every Night.   Taking    gabapentin (NEURONTIN) 100 MG capsule Take 1 capsule by mouth 3 (Three) Times a Day.   Taking    hydroCHLOROthiazide (HYDRODIURIL) 25 MG tablet Take 1 tablet by mouth Daily.   Taking    icosapent ethyl (VASCEPA) 1 g capsule capsule Take 1 g by mouth 2 (Two) Times a Day With Meals.   Taking    levothyroxine (SYNTHROID, LEVOTHROID) 25 MCG tablet Take 1 tablet by mouth  Every Morning.   Taking    Menthol, Topical Analgesic, 4 % gel Apply  topically 2 (Two) Times a Day. Bilateral shoulders, back neck for mild pain   Taking    menthol-zinc oxide (CALMOSEPTINE) 0.44-20.625 % ointment ointment Apply 1 Application topically to the appropriate area as directed Every 12 (Twelve) Hours. Bilateral buttocks topically every shift for tx   Taking    metFORMIN (GLUCOPHAGE) 500 MG tablet Take 1 tablet by mouth 2 (Two) Times a Day With Meals.   Taking    miconazole (MICOTIN) 2 % powder Apply  topically to the appropriate area as directed 2 (Two) Times a Day.   Taking    mineral oil-hydrophilic petrolatum (AQUAPHOR) ointment Apply 1 Application topically to the appropriate area as directed 2 (Two) Times a Day. Bilateral lower extremities   Taking    mirtazapine (REMERON) 7.5 MG tablet Take 1 tablet by mouth Every Night.   Taking    nitrofurantoin, macrocrystal-monohydrate, (MACROBID) 100 MG capsule Take 1 capsule by mouth every night at bedtime.   Taking    nystatin (MYCOSTATIN) 842853 UNIT/GM ointment Apply 1 Application topically to the appropriate area as directed Every 12 (Twelve) Hours As Needed.   Taking As Needed    omeprazole (priLOSEC) 20 MG capsule Take 1 capsule by mouth Daily.   Taking    ursodiol (ACTIGALL) 500 MG tablet Take 1 tablet by mouth 2 (Two) Times a Day.   Taking    venlafaxine XR (EFFEXOR-XR) 150 MG 24 hr capsule Take 1 capsule by mouth Daily.   Taking    acetaminophen (TYLENOL) 325 MG tablet Take 2 tablets by mouth Every 4 (Four) Hours As Needed for Mild Pain.       acetaminophen (TYLENOL) 650 MG suppository Insert 1 suppository into the rectum Every 4 (Four) Hours As Needed.       albuterol (PROVENTIL) (2.5 MG/3ML) 0.083% nebulizer solution Take 2.5 mg by nebulization Every 6 (Six) Hours As Needed for Shortness of Air.       bisacodyl (DULCOLAX) 10 MG suppository Insert 1 suppository into the rectum As Needed for Constipation. If no results from milk of magnesia.   If no  results in 8 hours, give enema.       dextrose (GLUTOSE) 40 % gel Take 15 g by mouth Every 15 (Fifteen) Minutes As Needed for Low Blood Sugar. Blood glucose < 60       dicyclomine (BENTYL) 10 MG capsule Take 1 capsule by mouth Every 6 (Six) Hours.       diphenoxylate-atropine (LOMOTIL) 2.5-0.025 MG per tablet Take 1 tablet by mouth Every 6 (Six) Hours As Needed for Diarrhea.       dry mouth gel (BIOTENE ORALBALANCE) gel Apply  to the mouth or throat Every 6 (Six) Hours As Needed for Dry Mouth. Give 15 cc by mouth every 6 hours as needed for dry mouth rinse 30 seconds then spit out       Glucagon, rDNA, (Glucagon Emergency) 1 MG kit Inject 1 mg as directed As Needed (Blood glucose <60).       guaiFENesin-dextromethorphan (ROBITUSSIN DM) 100-10 MG/5ML syrup Take 5 mL by mouth Every 4 (Four) Hours As Needed for Cough.       magnesium hydroxide (MILK OF MAGNESIA) 400 MG/5ML suspension Take 30 mL by mouth As Needed for Constipation. If no results in 8 hours, give bisacodyl suppository       nitroglycerin (NITROSTAT) 0.4 MG SL tablet Place 1 tablet under the tongue Every 5 (Five) Minutes As Needed for Chest Pain. Take no more than 3 doses in 15 minutes.       ondansetron (ZOFRAN) 4 MG tablet Take 1 tablet by mouth Every 6 (Six) Hours As Needed for Nausea or Vomiting.       Sodium Phosphates (fleet enema) 7-19 GM/118ML enema Insert 1 enema into the rectum As Needed. If no results from suppository          History  Past Medical History:   Diagnosis Date    Arthritis     CHF (congestive heart failure)     Diabetes mellitus     Disease of thyroid gland     Esophageal spasm     Gastritis     GERD (gastroesophageal reflux disease)     Hyperlipidemia     Hypertension     IBS (irritable bowel syndrome)     Sleep apnea      Past Surgical History:   Procedure Laterality Date    BLADDER SUSPENSION      CARDIAC CATHETERIZATION      CHOLECYSTECTOMY      HYSTERECTOMY         Family History  History reviewed. No pertinent family  history.    Social History   reports that she has never smoked. She has never been exposed to tobacco smoke. She has never used smokeless tobacco. She reports that she does not currently use alcohol. She reports that she does not use drugs.    Allergies  Dilaudid [hydromorphone], Hydromorphone hcl, Ciprofloxacin, Penicillins, and Sulfa antibiotics    Objective     Vital Signs   Vital Signs (last 24 hours)         10/29 0700  10/30 0659 10/30 0700  10/30 1502   Most Recent      Temp (°F) 97.3 -  98.1      97.8     97.8 (36.6) 10/30 0800    Heart Rate 67 -  89    76 -  80     80 10/30 1121    Resp 18 -  26    18 -  20     18 10/30 1121    BP 99/52 -  145/66      144/62     144/62 10/30 0800    SpO2 (%) 82 -  94    91 -  95     93 10/30 1121    Flow (L/min) (Oxygen Therapy) 11 -  35      35     35 10/30 1121    Oxygen Concentration (%) 55 -  71    50 -  60     50 10/30 1121            Physical Exam:  Physical Exam  Vitals and nursing note reviewed.   Constitutional:       General: She is not in acute distress.     Appearance: She is well-developed and normal weight. She is ill-appearing. She is not diaphoretic.   HENT:      Head: Normocephalic and atraumatic.   Eyes:      General: No scleral icterus.     Extraocular Movements: Extraocular movements intact.      Conjunctiva/sclera: Conjunctivae normal.      Pupils: Pupils are equal, round, and reactive to light.   Cardiovascular:      Rate and Rhythm: Normal rate and regular rhythm.      Heart sounds: Normal heart sounds, S1 normal and S2 normal. No murmur heard.  Pulmonary:      Effort: Pulmonary effort is normal. No respiratory distress.      Breath sounds: No stridor. Wheezing and rales present.   Chest:      Chest wall: No tenderness.   Abdominal:      General: Bowel sounds are normal. There is no distension.      Palpations: Abdomen is soft. There is no mass.      Tenderness: There is no abdominal tenderness. There is no guarding.   Musculoskeletal:          General: No swelling, tenderness or deformity.      Cervical back: Neck supple.   Skin:     General: Skin is warm and dry.      Coloration: Skin is not pale.      Findings: No bruising, erythema or rash.   Neurological:      Mental Status: She is alert. She is disoriented.         Microbiology  Microbiology Results (last 10 days)       Procedure Component Value - Date/Time    Legionella Antigen, Urine - Urine, Urine, Clean Catch [[id number]]  (Normal) Collected: 10/30/24 1139    Lab Status: Final result Specimen: Urine, Clean Catch Updated: 10/30/24 1221     LEGIONELLA ANTIGEN, URINE Negative    S. Pneumo Ag Urine or CSF - Urine, Urine, Clean Catch [[id number]]  (Normal) Collected: 10/30/24 1139    Lab Status: Final result Specimen: Urine, Clean Catch Updated: 10/30/24 1222     Strep Pneumo Ag Negative    MRSA Screen, PCR (Inpatient) - Swab, Nares [[id number]]  (Normal) Collected: 10/30/24 1139    Lab Status: Final result Specimen: Swab from Nares Updated: 10/30/24 1313     MRSA PCR No MRSA Detected    Narrative:      The negative predictive value of this diagnostic test is high and should only be used to consider de-escalating anti-MRSA therapy. A positive result may indicate colonization with MRSA and must be correlated clinically.    Blood Culture - Blood, Arm, Left [[id number]]  (Abnormal) Collected: 10/28/24 1410    Lab Status: Preliminary result Specimen: Blood from Arm, Left Updated: 10/30/24 0644     Blood Culture Staphylococcus, coagulase negative     Isolated from Aerobic and Anaerobic Bottles     Gram Stain Aerobic Bottle Gram positive cocci in clusters      Anaerobic Bottle Gram positive cocci in clusters    Blood Culture - Blood, Arm, Right [[id number]]  (Abnormal) Collected: 10/28/24 1353    Lab Status: Preliminary result Specimen: Blood from Arm, Right Updated: 10/30/24 0644     Blood Culture Staphylococcus, coagulase negative     Isolated from Aerobic and Anaerobic Bottles     Gram Stain Aerobic Bottle  Gram positive cocci in clusters      Anaerobic Bottle Gram positive cocci in clusters    Blood Culture ID, PCR - Blood, Arm, Right [[id number]]  (Abnormal) Collected: 10/28/24 1353    Lab Status: Final result Specimen: Blood from Arm, Right Updated: 10/29/24 1420     BCID, PCR Staph spp, not aureus or lugdunensis. Identification by BCID2 PCR.     BOTTLE TYPE Anaerobic Bottle    Respiratory Panel PCR w/COVID-19(SARS-CoV-2) [facility]OMAS/DARÍO/PAD/COR/DANAE In-House, NP Swab in UTM/VTM, 2 HR TAT - Swab, Nasopharynx [[id number]]  (Normal) Collected: 10/28/24 1331    Lab Status: Final result Specimen: Swab from Nasopharynx Updated: 10/28/24 1451     ADENOVIRUS, PCR Not Detected     Coronavirus 229E Not Detected     Coronavirus HKU1 Not Detected     Coronavirus NL63 Not Detected     Coronavirus OC43 Not Detected     COVID19 Not Detected     Human Metapneumovirus Not Detected     Human Rhinovirus/Enterovirus Not Detected     Influenza A PCR Not Detected     Influenza B PCR Not Detected     Parainfluenza Virus 1 Not Detected     Parainfluenza Virus 2 Not Detected     Parainfluenza Virus 3 Not Detected     Parainfluenza Virus 4 Not Detected     RSV, PCR Not Detected     Bordetella pertussis pcr Not Detected     Bordetella parapertussis PCR Not Detected     Chlamydophila pneumoniae PCR Not Detected     Mycoplasma pneumo by PCR Not Detected    Narrative:      In the setting of a positive respiratory panel with a viral infection PLUS a negative procalcitonin without other underlying concern for bacterial infection, consider observing off antibiotics or discontinuation of antibiotics and continue supportive care. If the respiratory panel is positive for atypical bacterial infection (Bordetella pertussis, Chlamydophila pneumoniae, or Mycoplasma pneumoniae), consider antibiotic de-escalation to target atypical bacterial infection.            Laboratory  Results from last 7 days   Lab Units 10/30/24  0519   WBC 10*3/mm3 12.64*   HEMOGLOBIN  g/dL 10.3*   HEMATOCRIT % 31.9*   PLATELETS 10*3/mm3 302     Results from last 7 days   Lab Units 10/30/24  0831   SODIUM mmol/L 141   POTASSIUM mmol/L 3.3*   CHLORIDE mmol/L 99   CO2 mmol/L 33.2*   BUN mg/dL 27*   CREATININE mg/dL 0.51*   GLUCOSE mg/dL 143*   CALCIUM mg/dL 9.2     Results from last 7 days   Lab Units 10/30/24  0831   SODIUM mmol/L 141   POTASSIUM mmol/L 3.3*   CHLORIDE mmol/L 99   CO2 mmol/L 33.2*   BUN mg/dL 27*   CREATININE mg/dL 0.51*   GLUCOSE mg/dL 143*   CALCIUM mg/dL 9.2                   Radiology  Imaging Results (Last 72 Hours)       Procedure Component Value Units Date/Time    CT Abdomen Pelvis Without Contrast [[id number]] Collected: 10/28/24 1817     Updated: 10/28/24 1824    Narrative:      CT ABDOMEN PELVIS WO CONTRAST    Date of Exam: 10/28/2024 6:13 PM EDT    Indication: sob.    Comparison: 5/4/2022    Technique: Axial CT images were obtained of the abdomen and pelvis without the administration of contrast. Sagittal and coronal reconstructions were performed.  Automated exposure control and iterative reconstruction methods were used.    FINDINGS:    Lung bases: Calcified granulomata with calcified mediastinal lymph nodes likely due to prior granulomatous disease. Severe atheromatous disease of the coronary vessels. Bilateral lower lobe atelectatic changes.    Liver: Calcified granuloma    Spleen: Calcified granulomata    Pancreas:No pancreatic masses. No evidence of pancreatitis.    Gallbladder and common bile duct: Previous cholecystectomy    Adrenal glands:No adrenal masses    Kidneys and ureters: Duplicated left ureter. No calculi present within the ureters. Normal caliber ureters.    Urinary bladder:No urinary bladder wall thickening. No bladder masses.    Small bowel:Normal caliber small bowel.    Large bowel:No diverticulosis or diverticulitis. No large bowel masses are appreciated    Appendix: Not seen however there is no evidence of appendicitis    GENITOURINARY: Prior  hysterectomy    Ascites or pneumoperitoneum:None.    Adenopathy:None present    Osseous structures: The proximal femurs are intact. Healed left pubic bone fractures. The sacroiliac joints are normal. Degenerative changes of the lumbar spine. No rib fractures.    Other findings: Severe atheromatous disease of the abdominal aorta and visualized branches.      Impression:      Lower lobe atelectasis involving both lungs.          Electronically Signed: Tonny Chen MD    10/28/2024 6:22 PM EDT    Workstation ID: [id number]    CT Angiogram Chest Pulmonary Embolism [[id number]] Collected: 10/28/24 1700     Updated: 10/28/24 1708    Narrative:      CT ANGIOGRAM CHEST PULMONARY EMBOLISM    Date of Exam: 10/28/2024 4:55 PM EDT    Indication: shortness of breath.    Comparison: [facility] chest radiograph 10/28/2024. CT chest 6/25/2021    Technique: Axial CT images were obtained of the chest after the uneventful intravenous administration of iodinated contrast utilizing pulmonary embolism protocol.  Sagittal and coronal reconstructions were performed.  Automated exposure control and   iterative reconstruction methods were used.      Findings:  No pulmonary embolism. Heart size is borderline enlarged. Mitral annulus calcifications are present. Aortic valve calcifications are present. Heavy calcific atherosclerosis is seen within the coronary arteries. There is mild thoracic aortic calcific   atherosclerosis. No thoracic aortic aneurysm or dissection is seen.    Dense consolidations are present within the bilateral lower lobes with volume loss. There is partial atelectasis in the right middle lobe. There is elevation of right hemidiaphragm. Benign calcified nodule in the left lung.    Benign calcified mediastinal lymph nodes are present. There is mild fluid distention of the mid to upper thoracic esophagus. There is some layering secretions within the upper thoracic trachea.    Cirrhotic liver morphology is demonstrated. Cholecystectomy  changes are present. Calcified granulomatous changes are present in the liver and spleen. Heavy calcific atherosclerosis is demonstrated the origins of the celiac artery and SMA as well as the   bilateral renal artery origins. A cyst is seen in the right upper renal pole. Pancreas appears moderately atrophic. Remainder of the imaged upper abdominal organs appear within the limits.    Severe degenerative changes of the shoulders. Moderate degenerative changes of the thoracic spine. Advanced degenerative disc and endplate changes at L1-2. No acute osseous abnormality. Lumbar levoscoliosis.      Impression:      Impression:    1. No pulmonary embolism.  2. Dense consolidations within the bilateral lower lobes. Correlate for pneumonia or sequela of aspiration.  3. Partial right middle lobe atelectasis may be related to elevation the right hemidiaphragm.  4. Benign calcified granulomatous changes in the chest.  5. Cirrhotic liver morphology. Correlate with known history.  6. Additional chronic findings as described above.        Electronically Signed: Julia Sanders MD    10/28/2024 5:06 PM EDT    Workstation ID: [id number]    XR Chest 1 View [[id number]] Collected: 10/28/24 1427     Updated: 10/28/24 1430    Narrative:      XR CHEST 1 VW    Date of Exam: 10/28/2024 2:09 PM EDT    Indication: CHF/COPD Protocol  CHF/COPD Protocol    Comparison: [facility] chest    12/16/2023. CT chest 6/25/2021    Findings:  Low volume inspiration. Moderate right hemidiaphragm elevation custodial of the hemithorax, with passive right lung atelectasis. Left lung appears clear. Benign calcified granuloma in the left midlung. Heart size is mildly enlarged but stable. Mitral   annulus calcifications are noted. Advanced degenerative changes of both shoulders with suspected calcified loose bodies in each shoulder joint. Osteopenic changes are present. No acute osseous abnormalities.      Impression:      Impression:    1. No acute chest findings.  2.  Appears stable moderate right hemidiaphragm elevation with passive right lung atelectasis.      Electronically Signed: Julia Sanders MD    10/28/2024 2:28 PM EDT    Workstation ID: [id number]            Cardiology      Results Review:  I have reviewed all clinical data, test, lab, and imaging results.       Schedule Meds  amLODIPine, 2.5 mg, Oral, Q24H  aspirin, 81 mg, Oral, Daily  budesonide, 0.5 mg, Nebulization, BID - RT  buPROPion, 75 mg, Oral, Daily  carvedilol, 12.5 mg, Oral, BID With Meals  cefTRIAXone, 2,000 mg, Intravenous, Q24H  [Held by provider] clonazePAM, 0.5 mg, Oral, Nightly  [Held by provider] dicyclomine, 10 mg, Oral, Q6H  enoxaparin, 40 mg, Subcutaneous, Q24H  furosemide, 20 mg, Intravenous, Q12H  [Held by provider] gabapentin, 100 mg, Oral, TID  guaiFENesin, 1,200 mg, Oral, Q12H  [Held by provider] hydroCHLOROthiazide, 25 mg, Oral, Daily  insulin lispro, 2-7 Units, Subcutaneous, 4x Daily AC & at Bedtime  ipratropium-albuterol, 3 mL, Nebulization, Q4H - RT  levothyroxine, 25 mcg, Oral, Q AM  methylPREDNISolone sodium succinate, 40 mg, Intravenous, Q12H  miconazole, , Topical, BID  mirtazapine, 7.5 mg, Oral, Nightly  muscle rub, 1 Application, Topical, BID  pantoprazole, 40 mg, Oral, Q AM  sodium chloride, 10 mL, Intravenous, Q12H  ursodiol, 500 mg, Oral, BID  vancomycin, 1,000 mg, Intravenous, Q12H  venlafaxine XR, 150 mg, Oral, Daily        Infusion Meds  dextrose 5 % and sodium chloride 0.9 %, 125 mL/hr, Last Rate: 125 mL/hr (10/30/24 1243)  Pharmacy to dose vancomycin,         PRN Meds    acetaminophen    senna-docusate sodium **AND** polyethylene glycol **AND** bisacodyl **AND** bisacodyl    Calcium Replacement - Follow Nurse / BPA Driven Protocol    dextrose    dextrose    diphenoxylate-atropine    glucagon (human recombinant)    Magnesium Standard Dose Replacement - Follow Nurse / BPA Driven Protocol    nitroglycerin    Pharmacy to dose vancomycin    Phosphorus Replacement - Follow Nurse /  BPA Driven Protocol    Potassium Replacement - Follow Nurse / BPA Driven Protocol    sodium chloride    sodium chloride      Assessment & Plan       Assessment    Bilateral lower lobe pneumonia some concern for aspiration.  Patient states she is normally on room air at home and denies any choking but she is a poor historian.  Pneumococcal and Legionella urine antigen is negative MRSA of the nares screen is negative.  COVID-19 screen and respiratory viral DNA panel are negative.  Pulmonology following.  Currently on high flow oxygen by [employer]    Positive blood culture in two out of two blood culture sets from admission growing a coagulase-negative Staphylococcus.  Patient did not come in with a line, port,  and has no history of cardiac device or cardiac valve replacement.  This is likely contamination.    History of dementia.  Patient is fairly confused and a poor historian.  Unsure what her mental baseline is    Congestive heart failure    Type 2 diabetes    Plan    Continue IV Rocephin 2 g every 24 hours for the pneumonia  Discontinue IV vancomycin  MRSA of the nares screen and pneumococcal Legionella urine antigen and ordered at the time of the consult  Consult speech therapy  Aspiration precautions  Continue supportive care  A.mALEXANDRA Herrera  10/30/24  15:02 EDT    Note is dictated utilizing voice recognition software/Dragon

## 2024-10-30 NOTE — THERAPY EVALUATION
Patient Name: Nadya Hurtado  : 1940    MRN: [id number]                              Today's Date: 10/30/2024       Admit Date: 10/28/2024    Visit Dx:     ICD-10-CM ICD-9-CM   1. Dyspnea, unspecified type  R06.00 786.09   2. Hypoxia  R09.02 799.02   3. Congestive heart failure, unspecified HF chronicity, unspecified heart failure type  I50.9 428.0   4. Pneumonia due to infectious organism, unspecified laterality, unspecified part of lung  J18.9 486     Patient Active Problem List   Diagnosis    Hypokalemia    UTI (urinary tract infection)    Acute metabolic encephalopathy    Dehydration    Acute right-sided weakness    Acute UTI    Acute respiratory failure with hypoxia    Primary hypertension    HLD (hyperlipidemia)    IBS (irritable bowel syndrome)    GERD without esophagitis    Reactive depression    Shortness of breath     Past Medical History:   Diagnosis Date    Arthritis     CHF (congestive heart failure)     Diabetes mellitus     Disease of thyroid gland     Esophageal spasm     Gastritis     GERD (gastroesophageal reflux disease)     Hyperlipidemia     Hypertension     IBS (irritable bowel syndrome)     Sleep apnea      Past Surgical History:   Procedure Laterality Date    BLADDER SUSPENSION      CARDIAC CATHETERIZATION      CHOLECYSTECTOMY      HYSTERECTOMY        General Information       Row Name 10/30/24 1440          Physical Therapy Time and Intention    Document Type evaluation  -     Mode of Treatment physical therapy  -       Row Name 10/30/24 1440          General Information    Patient Profile Reviewed yes  -     Existing Precautions/Restrictions fall  -     Barriers to Rehab medically complex;previous functional deficit;cognitive status  -       Row Name 10/30/24 1449          Living Environment    People in Home facility resident  -       Row Name 10/30/24 1440          Cognition    Orientation Status (Cognition) oriented to;person;disoriented to;place;situation;time  pt with  poor command following and poor attention to task. Perseverating on being hot.  Impaired cognition at baseline per chart review.  -       Row Name 10/30/24 1440          Safety Issues/Impairments Affecting Functional Mobility    Safety Issues Affecting Function (Mobility) ability to follow commands;problem-solving;judgment;insight into deficits/self-awareness;sequencing abilities  -     Impairments Affecting Function (Mobility) strength;range of motion (ROM);cognition;endurance/activity tolerance  -     Cognitive Impairments, Mobility Safety/Performance attention;insight into deficits/self-awareness;judgment;problem-solving/reasoning;sequencing abilities  -               User Key  (r) = Recorded By, (t) = Taken By, (c) = Cosigned By      Initials Name Provider Type     Mary Ray PT Physical Therapist                   Mobility       Row Name 10/30/24 1449          Bed Mobility    Bed Mobility bed mobility (all) activities  -     All Activities, [place] (Bed Mobility) maximum assist (25% patient effort);2 person assist  -     Assistive Device (Bed Mobility) bed rails;repositioning sheet  -     Comment, (Bed Mobility) rolling from supine to L sidelying. pt unable to follow commands.  -       Row Name 10/30/24 1449          Bed-Chair Transfer    Bed-Chair [place] (Transfers) not tested  -       Row Name 10/30/24 1449          Sit-Stand Transfer    Sit-Stand [place] (Transfers) not tested  -       Row Name 10/30/24 1449          Gait/Stairs (Locomotion)    [place] Level (Gait) not tested  -     Patient was able to Ambulate no, other medical factors prevent ambulation  -     Reason Patient was unable to Ambulate Non-Ambulatory at Baseline  -               User Key  (r) = Recorded By, (t) = Taken By, (c) = Cosigned By      Initials Name Provider Type     Mary Ray PT Physical Therapist                   Obj/Interventions       Row Name 10/30/24 1456           Range of Motion Comprehensive    Comment, General Range of Motion B ankles unable to DF to neutral.  -       Row Name 10/30/24 1458          Strength Comprehensive (MMT)    General Manual Muscle Testing (MMT) Assessment lower extremity strength deficits identified  -     Comment, General Manual Muscle Testing (MMT) Assessment grossly 2/5 hips and knees per limited supine screening. trace movement toes and ankles.  -       Row Name 10/30/24 1458          Motor Skills    Motor Skills functional endurance  -       Row Name 10/30/24 1458          Balance    Comment, Balance pt refuses sitting assessment.  -               User Key  (r) = Recorded By, (t) = Taken By, (c) = Cosigned By      Initials Name Provider Type     Mary Ray, KEERTHI Physical Therapist                   Goals/Plan    No documentation.                  Clinical Impression       Row Name 10/30/24 1500          Pain    Pretreatment Pain Rating 0/10 - no pain  -     Posttreatment Pain Rating 0/10 - no pain  -[facility] Name 10/30/24 1500          Plan of Care Review    Plan of Care Reviewed With patient  -     Outcome Evaluation Nadya Hurtado is a 83 y.o. female with a CMH of dementia, CHF, HTN, HLD, GERD, depression, DM who presented to [facility] on 10/28/2024 with shortness of breath.  Patient is a resident at [facility]. She developed acute onset of shortness of breath, requiring (new) oxygen supplementation (requiring AirVo).  CTA chest 10/28 results:   Dense consolidations within the bilateral lower lobes, Partial right middle lobe atelectasis, (-) for PE. CT abdomen 10/28 Impression: Lower lobe atelectasis involving both lungs. Pt with AMS and disorientation at baseline due to dementia.   Dx with Acute hypoxic hypercapnic respiratory failure, Acute CHF exacerbation,  Community-acquired pneumonia bilateral, UTI, Staph coagulase-negative bacteremia, and Bilateral lower lobe atelectasis. Pt resists most of the  assessment today and perseverates on feeling hot. Frequent redirection needed.  PT contacted Dtr/POA who reports pt is rarely OOB and refuses participation in any rec or therapy activities at [facility].  Pt and POA have no therapy goals at this time. Based on this information, PT will sign off and complete order.  -       Row Name 10/30/24 1500          Therapy Assessment/Plan (PT)    Patient/Family Therapy Goals Statement (PT) none  -     Criteria for Skilled Interventions Met (PT) no;does not meet criteria for skilled intervention;patient/family refuse skilled intervention at this time  -     Therapy Frequency (PT) evaluation only  -       Row Name 10/30/24 1500          Vital Signs    Pre SpO2 (%) 91  -AH     O2 Delivery Pre Treatment --  AirVo  -AH     Intra SpO2 (%) 88  -AH     O2 Delivery Intra Treatment --  AirVo  -AH     Post SpO2 (%) 91  -AH     O2 Delivery Post Treatment --  AirVo  -AH     Pre Patient Position Supine  -     Intra Patient Position Side Lying  -     Post Patient Position Side Lying  -[facility] Name 10/30/24 1500          Positioning and Restraints    Post Treatment Position bed  -     In Bed side lying left;call light within reach;notified nsg;encouraged to call for assist;exit alarm on  -               User Key  (r) = Recorded By, (t) = Taken By, (c) = Cosigned By      Initials Name Provider Type     Mary Ray, KEERTHI Physical Therapist                   Outcome Measures       Row Name 10/30/24 1512 10/30/24 0833       How much help from another person do you currently need...    Turning from your back to your side while in flat bed without using bedrails? 2  - 2  -TA    Moving from lying on back to sitting on the side of a flat bed without bedrails? 1  - 2  -TA    Moving to and from a bed to a chair (including a wheelchair)? 1  - 1  -TA    Standing up from a chair using your arms (e.g., wheelchair, bedside chair)? 1  - 1  -TA    Climbing 3-5 steps with a railing? 1   - 1  -TA    To walk in hospital room? 1  - 1  -TA    AM-PAC 6 Clicks Score (PT) 7  - 8  -TA    Highest Level of Mobility Goal 2 --> Bed activities/dependent transfer  - 3 --> Sit at edge of bed  -TA              User Key  (r) = Recorded By, (t) = Taken By, (c) = Cosigned By      Initials Name Provider Type     Rebekah Nelson, RN Registered Nurse     Mary Ray, KEERTHI Physical Therapist                                 Physical Therapy Education       Title: PT OT SLP Therapies (In Progress)       Topic: Physical Therapy (In Progress)       Point: Mobility training (In Progress)       Learning Progress Summary            Patient Nonacceptance, E, NR,NL by  at 10/30/2024 1513                      Point: Home exercise program (In Progress)       Learning Progress Summary            Patient Nonacceptance, E, NR,NL by  at 10/30/2024 1513                      Point: Body mechanics (In Progress)       Learning Progress Summary            Patient Nonacceptance, E, NR,NL by  at 10/30/2024 1513                      Point: Precautions (In Progress)       Learning Progress Summary            Patient Nonacceptance, E, NR,NL by  at 10/30/2024 1513                                      User Key       Initials Effective Dates Name Provider Type [facility] 12/04/23 -  Mary Ray, KEERTHI Physical Therapist PT                  PT Recommendation and Plan     Outcome Evaluation: Nadya Hurtado is a 83 y.o. female with a CMH of dementia, CHF, HTN, HLD, GERD, depression, DM who presented to [facility] on 10/28/2024 with shortness of breath.  Patient is a resident at [facility]. She developed acute onset of shortness of breath, requiring (new) oxygen supplementation (requiring AirVo).  CTA chest 10/28 results:   Dense consolidations within the bilateral lower lobes, Partial right middle lobe atelectasis, (-) for PE. CT abdomen 10/28 Impression: Lower lobe atelectasis involving both lungs. Pt with  AMS and disorientation at baseline due to dementia.   Dx with Acute hypoxic hypercapnic respiratory failure, Acute CHF exacerbation,  Community-acquired pneumonia bilateral, UTI, Staph coagulase-negative bacteremia, and Bilateral lower lobe atelectasis. Pt resists most of the assessment today and perseverates on feeling hot. Frequent redirection needed.  PT contacted Dtr/POA who reports pt is rarely OOB and refuses participation in any rec or therapy activities at SNF.  Pt and POA have no therapy goals at this time. Based on this information, PT will sign off and complete order.     Time Calculation:         PT Charges       Row Name 10/30/24 1513             Time Calculation    Start Time 1250  -      Stop Time 1308  and [phone] phone call to clarify baseline.  -      Time Calculation (min) 18 min  -      PT Non-Billable Time (min) 0 min  -      PT Received On 10/30/24  -      PT - Next Appointment --  -      PT Goal Re-Cert Due Date --  -         Time Calculation- PT    Total Timed Code Minutes- PT 0 minute(s)  -                User Key  (r) = Recorded By, (t) = Taken By, (c) = Cosigned By      Initials Name Provider Type     Mary Ray, PT Physical Therapist                  Therapy Charges for Today       Code Description Service Date Service Provider Modifiers Qty    87781338481 HC-PT EVAL MOD COMPLEXITY 5 10/30/2024 Mary Ray, PT  1            PT G-Codes  AM-PAC 6 Clicks Score (PT): 7  PT Discharge Summary  Anticipated Discharge Disposition (PT): extended care facility    Mary Ray PT  10/30/2024

## 2024-10-30 NOTE — PROGRESS NOTES
[facility] MEDICINE SERVICE  DAILY PROGRESS NOTE    NAME: Nadya Hurtado  : 1940  MRN: [id number]      LOS: 2 days     PROVIDER OF SERVICE: Tess Hathaway MD    Chief Complaint: Shortness of breath    Subjective:     Interval History:    Patient seen and evaluated at bedside.   Patient alert and oriented to self and place, on Airvo  Treatment plan discussed with patient. All questions addressed.     Review of Systems:   All 21 ROS were negative except mentioned above.    Objective:     Vital Signs  Temp:  [97.3 °F (36.3 °C)-98.1 °F (36.7 °C)] 97.7 °F (36.5 °C)  Heart Rate:  [67-89] 77  Resp:  [18-26] 20  BP: ()/(52-66) 145/61  Flow (L/min) (Oxygen Therapy):  [11-35] 35   Body mass index is 26.82 kg/m².    Physical Exam   General: No acute distress, appears stated age  Neuro: Awake and alert, oriented x3, no focal deficits appreciated  Head: atraumatic, normocephalic  HEENT: EOMI, anicteric, normal sclera and conjunctivae, moist mucus membranes  Neck: supple, no lymphadenopathy  CV: RRR, soft heart sounds, no murmurs appreciated, no peripheral edema  Pulm: Decreased breath sounds, no increased work of breathing, no adventitious sounds  Abd: Soft, nontender, nondistended  Skin: Stage I pressure ulcers on elbow, buttocks and sacrum  Psych: Appropriate mood and affect    Scheduled Meds   [Held by provider] amLODIPine, 10 mg, Oral, Daily  aspirin, 81 mg, Oral, Daily  budesonide, 0.5 mg, Nebulization, BID - RT  buPROPion, 75 mg, Oral, Daily  carvedilol, 12.5 mg, Oral, BID With Meals  cefTRIAXone, 2,000 mg, Intravenous, Q24H  [Held by provider] clonazePAM, 0.5 mg, Oral, Nightly  [Held by provider] dicyclomine, 10 mg, Oral, Q6H  enoxaparin, 40 mg, Subcutaneous, Q24H  furosemide, 20 mg, Intravenous, Q12H  [Held by provider] gabapentin, 100 mg, Oral, TID  guaiFENesin, 1,200 mg, Oral, Q12H  [Held by provider] hydroCHLOROthiazide, 25 mg, Oral, Daily  insulin lispro, 2-7 Units, Subcutaneous, 4x Daily AC & at  Bedtime  ipratropium-albuterol, 3 mL, Nebulization, Q4H - RT  levothyroxine, 25 mcg, Oral, Q AM  methylPREDNISolone sodium succinate, 40 mg, Intravenous, Q12H  miconazole, , Topical, BID  mirtazapine, 7.5 mg, Oral, Nightly  muscle rub, 1 Application, Topical, BID  pantoprazole, 40 mg, Oral, Q AM  sodium chloride, 10 mL, Intravenous, Q12H  ursodiol, 500 mg, Oral, BID  vancomycin, 1,000 mg, Intravenous, Q12H  venlafaxine XR, 150 mg, Oral, Daily       PRN Meds     acetaminophen    senna-docusate sodium **AND** polyethylene glycol **AND** bisacodyl **AND** bisacodyl    Calcium Replacement - Follow Nurse / BPA Driven Protocol    dextrose    dextrose    diphenoxylate-atropine    glucagon (human recombinant)    Magnesium Standard Dose Replacement - Follow Nurse / BPA Driven Protocol    nitroglycerin    Pharmacy to dose vancomycin    Phosphorus Replacement - Follow Nurse / BPA Driven Protocol    Potassium Replacement - Follow Nurse / BPA Driven Protocol    sodium chloride    sodium chloride   Infusions  dextrose 5 % and sodium chloride 0.9 %, 125 mL/hr, Last Rate: 125 mL/hr (10/30/24 0522)  Pharmacy to dose vancomycin,           Diagnostic Data    Results from last 7 days   Lab Units 10/30/24  0519 10/29/24  0212   WBC 10*3/mm3 12.64* 15.06*   HEMOGLOBIN g/dL 10.3* 11.8*   HEMATOCRIT % 31.9* 35.8   PLATELETS 10*3/mm3 302 270   GLUCOSE mg/dL  --  159*   CREATININE mg/dL  --  0.57   BUN mg/dL  --  19   SODIUM mmol/L  --  136   POTASSIUM mmol/L  --  4.0   AST (SGOT) U/L  --  11   ALT (SGPT) U/L  --  <5   ALK PHOS U/L  --  70   BILIRUBIN mg/dL  --  0.4   ANION GAP mmol/L  --  11.7       CT Abdomen Pelvis Without Contrast    Result Date: 10/28/2024  Lower lobe atelectasis involving both lungs. Electronically Signed: Tonny Chen MD  10/28/2024 6:22 PM EDT  Workstation ID: [id number]    CT Angiogram Chest Pulmonary Embolism    Result Date: 10/28/2024  Impression: 1. No pulmonary embolism. 2. Dense consolidations within the  bilateral lower lobes. Correlate for pneumonia or sequela of aspiration. 3. Partial right middle lobe atelectasis may be related to elevation the right hemidiaphragm. 4. Benign calcified granulomatous changes in the chest. 5. Cirrhotic liver morphology. Correlate with known history. 6. Additional chronic findings as described above. Electronically Signed: Julia Sanders MD  10/28/2024 5:06 PM EDT  Workstation ID: [id number]    XR Chest 1 View    Result Date: 10/28/2024  Impression: 1. No acute chest findings. 2. Appears stable moderate right hemidiaphragm elevation with passive right lung atelectasis. Electronically Signed: Julia Sanders MD  10/28/2024 2:28 PM EDT  Workstation ID: [id number]     Interval results reviewed.    Assessment/Plan:   Acute hypoxic hypercapnic respiratory failure  Acute CHF exacerbation  Community-acquired pneumonia bilateral  UTI  Staph coagulase-negative bacteremia  Bilateral lower lobe atelectasis  Hypertension  Diabetes  GERD  Hypothyroidism  Major depression  Cirrhosis     patient on Airvo, continue on board.    White blood count trending down.  Blood cultures 2 out of 2 positive for coagulase-negative Staphylococcus.  Patient on vancomycin.  Patient does have stage I pressure ulcers on sacrum buttocks and elbows.  Repeat blood blood cultures are pending will consult ID  Continue ceftriaxone -- low procalcitonin   patient on Lasix 20 mg IV twice daily.  Strict intake output Daily weights and less than 2 g salt diet  Check urine culture  2D echo fraction 61-66 5%, LVH, moderate mitral valve stenosis and aortic stenosis.  Start on amlodipine 2.5 mg daily.  Paliative care consulted      Treatment plan discussed with RN.     VTE Prophylaxis:  Pharmacologic VTE prophylaxis orders are present.         Code status is   Code Status and Medical Interventions: No CPR (Do Not Attempt to Resuscitate); Limited Support; No intubation (DNI)   Ordered at: 10/28/24 5311     Medical Intervention Limits:     No intubation (DNI)     Code Status (Patient has no pulse and is not breathing):    No CPR (Do Not Attempt to Resuscitate)     Medical Interventions (Patient has pulse or is breathing):    Limited Support       Plan for disposition:     Barriers to Discharge: On IV antibiotics, Airvo  Anticipated Date of Discharge: 11/3/24  Place of Discharge: Home      Time: 40 minutes     Signature: Electronically signed by Tess Hathaway MD, 10/30/24, 08:24 EDT.  [facility], [place], operated by [facility] Hospitalist Team

## 2024-10-30 NOTE — CONSULTS
"Nutrition Services    Patient Name: Nadya Hurtado  YOB: 1940  MRN: [id number]  Admission date: 10/28/2024    Comment:  -- Diet per MD      CLINICAL NUTRITION ASSESSMENT      Reason for Assessment 10/30: MST 2     H&P      Past Medical History:   Diagnosis Date    Arthritis     CHF (congestive heart failure)     Diabetes mellitus     Disease of thyroid gland     Esophageal spasm     Gastritis     GERD (gastroesophageal reflux disease)     Hyperlipidemia     Hypertension     IBS (irritable bowel syndrome)     Sleep apnea        Past Surgical History:   Procedure Laterality Date    BLADDER SUSPENSION      CARDIAC CATHETERIZATION      CHOLECYSTECTOMY      HYSTERECTOMY          Current Problems   Shortness of breath  Compensated respiratory acidosis  - pulmonology following  - PT/OT following  - palliative consulted    Leukocytosis     GERD     HTN     DM       Encounter Information        Trending Narrative     10/30: Admitted for SOB.  RD attempted to visit patient at bedside, patient getting changed per nursing.         Anthropometrics        Current Height, Weight Height: 170.2 cm (67.01\")  Weight: 77.7 kg (171 lb 4.8 oz) (10/28/24 2020)       Usual Body Weight (UBW) Unable to obtain from patient        Trending Weight Hx     This admission: 10/30: 171#             PTA: No recent weights to note    Wt Readings from Last 30 Encounters:   10/28/24 2020 77.7 kg (171 lb 4.8 oz)   10/28/24 1244 77.1 kg (170 lb)   12/16/23 1854 45.4 kg (100 lb)   11/14/22 1636 86.2 kg (190 lb)   11/14/22 0205 86.3 kg (190 lb 4.1 oz)   11/13/22 1156 86.2 kg (190 lb)   05/11/22 0427 74.3 kg (163 lb 12.8 oz)   05/10/22 2036 74.3 kg (163 lb 12.8 oz)   05/10/22 0318 73.7 kg (162 lb 7.7 oz)   05/09/22 0418 72.3 kg (159 lb 6.3 oz)   05/07/22 0430 75.1 kg (165 lb 9.1 oz)   05/06/22 1942 75.1 kg (165 lb 9.1 oz)   05/06/22 0326 73.1 kg (161 lb 1.6 oz)   05/05/22 0409 75 kg (165 lb 4.8 oz)   05/04/22 2113 78.9 kg (174 lb)   05/04/22 1247 " 78.9 kg (174 lb)   12/18/21 1121 84.4 kg (186 lb)   06/25/21 1412 84.8 kg (186 lb 15.2 oz)      BMI kg/m2 Body mass index is 26.82 kg/m².       Labs        Pertinent Labs    Results from last 7 days   Lab Units 10/30/24  0831 10/29/24  0212 10/28/24  1325   SODIUM mmol/L 141 136 134*   POTASSIUM mmol/L 3.3* 4.0 4.0   CHLORIDE mmol/L 99 91* 92*   CO2 mmol/L 33.2* 33.3* 33.2*   BUN mg/dL 27* 19 20   CREATININE mg/dL 0.51* 0.57 0.69   CALCIUM mg/dL 9.2 9.6 9.6   BILIRUBIN mg/dL  --  0.4 0.7   ALK PHOS U/L  --  70 74   ALT (SGPT) U/L  --  <5 <5   AST (SGOT) U/L  --  11 14   GLUCOSE mg/dL 143* 159* 133*     Results from last 7 days   Lab Units 10/30/24  0519   HEMOGLOBIN g/dL 10.3*   HEMATOCRIT % 31.9*     Lab Results   Component Value Date    HGBA1C 4.9 11/13/2022        Medications    Scheduled Medications amLODIPine, 2.5 mg, Oral, Q24H  aspirin, 81 mg, Oral, Daily  budesonide, 0.5 mg, Nebulization, BID - RT  buPROPion, 75 mg, Oral, Daily  carvedilol, 12.5 mg, Oral, BID With Meals  cefTRIAXone, 2,000 mg, Intravenous, Q24H  [Held by provider] clonazePAM, 0.5 mg, Oral, Nightly  [Held by provider] dicyclomine, 10 mg, Oral, Q6H  enoxaparin, 40 mg, Subcutaneous, Q24H  furosemide, 20 mg, Intravenous, Q12H  [Held by provider] gabapentin, 100 mg, Oral, TID  guaiFENesin, 1,200 mg, Oral, Q12H  [Held by provider] hydroCHLOROthiazide, 25 mg, Oral, Daily  insulin lispro, 2-7 Units, Subcutaneous, 4x Daily AC & at Bedtime  ipratropium-albuterol, 3 mL, Nebulization, Q4H - RT  levothyroxine, 25 mcg, Oral, Q AM  methylPREDNISolone sodium succinate, 40 mg, Intravenous, Q12H  miconazole, , Topical, BID  mirtazapine, 7.5 mg, Oral, Nightly  muscle rub, 1 Application, Topical, BID  pantoprazole, 40 mg, Oral, Q AM  potassium chloride, 10 mEq, Intravenous, Q1H  sodium chloride, 10 mL, Intravenous, Q12H  ursodiol, 500 mg, Oral, BID  vancomycin, 1,000 mg, Intravenous, Q12H  venlafaxine XR, 150 mg, Oral, Daily        Infusions dextrose 5 % and  sodium chloride 0.9 %, 125 mL/hr, Last Rate: 125 mL/hr (10/30/24 1243)  Pharmacy to dose vancomycin,         PRN Medications   acetaminophen    senna-docusate sodium **AND** polyethylene glycol **AND** bisacodyl **AND** bisacodyl    Calcium Replacement - Follow Nurse / BPA Driven Protocol    dextrose    dextrose    diphenoxylate-atropine    glucagon (human recombinant)    Magnesium Standard Dose Replacement - Follow Nurse / BPA Driven Protocol    nitroglycerin    Pharmacy to dose vancomycin    Phosphorus Replacement - Follow Nurse / BPA Driven Protocol    Potassium Replacement - Follow Nurse / BPA Driven Protocol    sodium chloride    sodium chloride     Physical Findings        Trending Physical   Appearance, NFPE 10/30: SHARON   --  Edema  No edema documented      Bowel Function Last documented BM 10/28 (x 2 days ago)     Tubes No feeding tube      Chewing/Swallowing Unknown baseline      Skin Per WOCN note 10/29  - Erythema to her sacrum and right heel      --  Current Nutrition Orders & Evaluation of Intake       Oral Nutrition     Food Allergies NKFA   Current PO Diet NPO Diet NPO Type: Strict NPO   Supplement None ordered   PO Evaluation     Trending % PO Intake 10/30: NPO   --  Nutritional Risk Screening        NRS-2002 Score          Nutrition Diagnosis         Nutrition Dx Problem 1 Inadequate energy intake related to clinical course as evidenced by NPO.       Nutrition Dx Problem 2        Intervention Goal         Intervention Goal(s) Diet advancement per MD     Nutrition Intervention        RD Action Diet advancement per MD     Nutrition Prescription          Diet Prescription NPO   Supplement Prescription    --  Monitor/Evaluation        Monitor Per protocol, I&O, PO intake, Supplement intake, Pertinent labs, Weight       Electronically signed by:  Jovanna Silva RD  10/30/24 14:13 EDT

## 2024-10-30 NOTE — CONSULTS
Palliative Care Consultation    Patient Name: Nadya Hurtado  : 1940  MRN: [id number]  Allergies: Dilaudid [hydromorphone], Hydromorphone hcl, Ciprofloxacin, Penicillins, and Sulfa antibiotics    Requesting clinician:  Rivas  Reason for consult: Consultation for clarification of goals of care and code status.      Patient Code Status:   Code Status and Medical Interventions: No CPR (Do Not Attempt to Resuscitate); Limited Support; No intubation (DNI)   Ordered at: 10/28/24 2203     Medical Intervention Limits:    No intubation (DNI)     Code Status (Patient has no pulse and is not breathing):    No CPR (Do Not Attempt to Resuscitate)     Medical Interventions (Patient has pulse or is breathing):    Limited Support           Chief Complaint:    Shortness of breath     History of Present Illness    Nadya Hurtado is a 83 y.o. female who presented to [facility] ED on 10/28 from [facility] with reports of shortness of breath. Patient disoriented at baseline, unable to provide any info for HPI. Per family, patient noted with a slight cough, no fevers, chills, nausea, vomiting, or chest pain.     In ED: Glucose 133, Na 134, proBNP 2654, WBC 15.81    UA with small leuks, trace protein    Patient started on supplemental 02 in ED. Given IV antibiotics for leukocytosis and possible UTI. Pulmonary consulted.     VITAL SIGNS:   Temp:  [97.5 °F (36.4 °C)-97.8 °F (36.6 °C)] 97.8 °F (36.6 °C)  Heart Rate:  [67-89] 80  Resp:  [18-26] 18  BP: (119-145)/(59-65) 144/62       PMH: CHF, DM, GERD, HLD, HTN, IBS    Past Surgical History:   Procedure Laterality Date    BLADDER SUSPENSION      CARDIAC CATHETERIZATION      CHOLECYSTECTOMY      HYSTERECTOMY         History reviewed. No pertinent family history.    Social History     Tobacco Use    Smoking status: Never     Passive exposure: Never    Smokeless tobacco: Never   Vaping Use    Vaping status: Never Used   Substance Use Topics    Alcohol use: Not Currently    Drug use: Never            LABS:    Results from last 7 days   Lab Units 10/30/24  0519   WBC 10*3/mm3 12.64*   HEMOGLOBIN g/dL 10.3*   HEMATOCRIT % 31.9*   PLATELETS 10*3/mm3 302     Results from last 7 days   Lab Units 10/30/24  0831   SODIUM mmol/L 141   POTASSIUM mmol/L 3.3*   CHLORIDE mmol/L 99   CO2 mmol/L 33.2*   BUN mg/dL 27*   CREATININE mg/dL 0.51*   GLUCOSE mg/dL 143*   CALCIUM mg/dL 9.2     Results from last 7 days   Lab Units 10/30/24  0831 10/29/24  0212   SODIUM mmol/L 141 136   POTASSIUM mmol/L 3.3* 4.0   CHLORIDE mmol/L 99 91*   CO2 mmol/L 33.2* 33.3*   BUN mg/dL 27* 19   CREATININE mg/dL 0.51* 0.57   CALCIUM mg/dL 9.2 9.6   BILIRUBIN mg/dL  --  0.4   ALK PHOS U/L  --  70   ALT (SGPT) U/L  --  <5   AST (SGOT) U/L  --  11   GLUCOSE mg/dL 143* 159*         IMAGING STUDIES:  CT Abdomen Pelvis Without Contrast    Result Date: 10/28/2024  Lower lobe atelectasis involving both lungs. Electronically Signed: Tonny Chen MD  10/28/2024 6:22 PM EDT  Workstation ID: [id number]    CT Angiogram Chest Pulmonary Embolism    Result Date: 10/28/2024  Impression: 1. No pulmonary embolism. 2. Dense consolidations within the bilateral lower lobes. Correlate for pneumonia or sequela of aspiration. 3. Partial right middle lobe atelectasis may be related to elevation the right hemidiaphragm. 4. Benign calcified granulomatous changes in the chest. 5. Cirrhotic liver morphology. Correlate with known history. 6. Additional chronic findings as described above. Electronically Signed: Julia Sanders MD  10/28/2024 5:06 PM EDT  Workstation ID: [id number]    XR Chest 1 View    Result Date: 10/28/2024  Impression: 1. No acute chest findings. 2. Appears stable moderate right hemidiaphragm elevation with passive right lung atelectasis. Electronically Signed: Julia Sanders MD  10/28/2024 2:28 PM EDT  Workstation ID: [id number]       I reviewed the patient's new clinical results including labs, imaging, and vitals.        Scheduled Meds:  amLODIPine, 2.5  mg, Oral, Q24H  aspirin, 81 mg, Oral, Daily  budesonide, 0.5 mg, Nebulization, BID - RT  buPROPion, 75 mg, Oral, Daily  carvedilol, 12.5 mg, Oral, BID With Meals  cefTRIAXone, 2,000 mg, Intravenous, Q24H  [Held by provider] clonazePAM, 0.5 mg, Oral, Nightly  [Held by provider] dicyclomine, 10 mg, Oral, Q6H  enoxaparin, 40 mg, Subcutaneous, Q24H  furosemide, 20 mg, Intravenous, Q12H  [Held by provider] gabapentin, 100 mg, Oral, TID  guaiFENesin, 1,200 mg, Oral, Q12H  [Held by provider] hydroCHLOROthiazide, 25 mg, Oral, Daily  insulin lispro, 2-7 Units, Subcutaneous, 4x Daily AC & at Bedtime  ipratropium-albuterol, 3 mL, Nebulization, Q4H - RT  levothyroxine, 25 mcg, Oral, Q AM  methylPREDNISolone sodium succinate, 40 mg, Intravenous, Q12H  miconazole, , Topical, BID  mirtazapine, 7.5 mg, Oral, Nightly  muscle rub, 1 Application, Topical, BID  pantoprazole, 40 mg, Oral, Q AM  potassium chloride, 10 mEq, Intravenous, Q1H  sodium chloride, 10 mL, Intravenous, Q12H  ursodiol, 500 mg, Oral, BID  vancomycin, 1,000 mg, Intravenous, Q12H  venlafaxine XR, 150 mg, Oral, Daily      Continuous Infusions:  dextrose 5 % and sodium chloride 0.9 %, 125 mL/hr, Last Rate: 125 mL/hr (10/30/24 0522)  Pharmacy to dose vancomycin,         I have reviewed patient's current medication list.     Review of Systems   Unable to perform ROS: Mental status change   All other systems reviewed and are negative.        Physical Exam  Vitals and nursing note reviewed.   Constitutional:       Appearance: She is ill-appearing.   HENT:      Head: Normocephalic and atraumatic.      Nose: Nose normal.      Mouth/Throat:      Mouth: Mucous membranes are dry.      Pharynx: Oropharynx is clear.   Eyes:      Conjunctiva/sclera: Conjunctivae normal.   Cardiovascular:      Rate and Rhythm: Normal rate.      Pulses: Normal pulses.   Pulmonary:      Effort: Pulmonary effort is normal.   Abdominal:      Palpations: Abdomen is soft.   Musculoskeletal:          General: Normal range of motion.      Cervical back: Normal range of motion.   Skin:     General: Skin is dry.             PROBLEM LIST:    Shortness of breath          ASSESSMENT/PLAN:    Acute hypoxic respiratory failure: with elevated BNP. Given IV lasix, Echo obtained. On supplemental 02. Pulmonary consulted.     CAP: noted on chest x-ray. On antibiotics. Culture pending.     UTI?: with leukocytosis. Started on IV antibiotics. Cultures obtained.     GERD/HTN/DM: chronic conditions per primary.     These illnesses and their management contribute to the need for a palliative consult and advanced care planning.      ADVANCED CARE PLANNING:     10/30  Met with patient this afternoon. She is alert, with questionable orientation status. Able to tell me her name but fixated on how hot the room is and mistaking me for previous providers that visited with the patient. She tells me that she has a daughter Michelle that is her POA. No family present at bedside. I attempted phone call but was unsuccessful. I will follow up.       Advanced Directives: Patient has advance directive, copy in chart  Health Care Directive on file: No  Health Care Surrogate:      Palliative Performance Scale Score:    Comments:           Decisional Capacity: no  Patient's understanding of illness: unknown  Patient goals of care:  DNR/DNI      Thank you for this consult and allowing us to participate in patient's plan of care. Palliative Care Team will continue to follow patient.       I spent 45 minutes reviewing providers documentation, medication records, assessing and examining patient, discussing with family, answering questions, providing guidance about a plan of care, and coordinating care with other healthcare members. More than 50% of time spent face to face discussing disease education, current clinical status, and medication management.       Kaley Christiansen, ALEXANDRA  10/30/2024

## 2024-10-30 NOTE — PLAN OF CARE
Problem: Adult Inpatient Plan of Care  Goal: Absence of Hospital-Acquired Illness or Injury  Intervention: Identify and Manage Fall Risk  Recent Flowsheet Documentation  Taken 10/30/2024 0400 by Jose Angel Christianson RN  Safety Promotion/Fall Prevention:   safety round/check completed   room organization consistent   nonskid shoes/slippers when out of bed   fall prevention program maintained   clutter free environment maintained   assistive device/personal items within reach  Taken 10/30/2024 0000 by Jose Angel Christianson RN  Safety Promotion/Fall Prevention:   safety round/check completed   room organization consistent   nonskid shoes/slippers when out of bed   fall prevention program maintained   clutter free environment maintained   assistive device/personal items within reach  Taken 10/29/2024 2000 by Jose Angel Christianson RN  Safety Promotion/Fall Prevention:   safety round/check completed   room organization consistent   nonskid shoes/slippers when out of bed   fall prevention program maintained   clutter free environment maintained   assistive device/personal items within reach  Intervention: Prevent Skin Injury  Recent Flowsheet Documentation  Taken 10/30/2024 0400 by Jose Angel Christianson RN  Body Position: tilted  Skin Protection:   incontinence pads utilized   silicone foam dressing in place  Taken 10/30/2024 0000 by Jose Angel Christianson RN  Body Position: turned  Skin Protection: incontinence pads utilized  Taken 10/29/2024 2000 by Jose Angel Christianson RN  Body Position: supine  Skin Protection: incontinence pads utilized  Intervention: Prevent and Manage VTE (Venous Thromboembolism) Risk  Recent Flowsheet Documentation  Taken 10/29/2024 2000 by Jose Angel Christianson RN  VTE Prevention/Management: SCDs (sequential compression devices) on  Intervention: Prevent Infection  Recent Flowsheet Documentation  Taken 10/30/2024 0400 by Jose Angel Christianson RN  Infection Prevention:   environmental surveillance performed   hand hygiene  promoted   personal protective equipment utilized   rest/sleep promoted   single patient room provided  Taken 10/30/2024 0000 by Jose Angel Christianson RN  Infection Prevention:   environmental surveillance performed   hand hygiene promoted   personal protective equipment utilized   rest/sleep promoted   single patient room provided  Taken 10/29/2024 2000 by Jose Angel Christianson RN  Infection Prevention:   environmental surveillance performed   hand hygiene promoted   personal protective equipment utilized   rest/sleep promoted   single patient room provided  Goal: Optimal Comfort and Wellbeing  Intervention: Provide Person-Centered Care  Recent Flowsheet Documentation  Taken 10/29/2024 2000 by Jose Angel Christianson RN  Trust Relationship/Rapport:   care explained   emotional support provided   empathic listening provided     Problem: Fall Injury Risk  Goal: Absence of Fall and Fall-Related Injury  Intervention: Identify and Manage Contributors  Recent Flowsheet Documentation  Taken 10/30/2024 0000 by Jose Angel Christianson RN  Medication Review/Management: medications reviewed  Intervention: Promote Injury-Free Environment  Recent Flowsheet Documentation  Taken 10/30/2024 0400 by Jose Angel Christianson RN  Safety Promotion/Fall Prevention:   safety round/check completed   room organization consistent   nonskid shoes/slippers when out of bed   fall prevention program maintained   clutter free environment maintained   assistive device/personal items within reach  Taken 10/30/2024 0000 by Jose Angel Christianson RN  Safety Promotion/Fall Prevention:   safety round/check completed   room organization consistent   nonskid shoes/slippers when out of bed   fall prevention program maintained   clutter free environment maintained   assistive device/personal items within reach  Taken 10/29/2024 2000 by Jose Angel Christianson RN  Safety Promotion/Fall Prevention:   safety round/check completed   room organization consistent   nonskid shoes/slippers when out  of bed   fall prevention program maintained   clutter free environment maintained   assistive device/personal items within reach     Problem: Comorbidity Management  Goal: Blood Glucose Level Within Target Range  Intervention: Monitor and Manage Glycemia  Recent Flowsheet Documentation  Taken 10/30/2024 0000 by Jose Angel Christianson RN  Medication Review/Management: medications reviewed  Goal: Maintenance of Heart Failure Symptom Control  Intervention: Maintain Heart Failure Management  Recent Flowsheet Documentation  Taken 10/30/2024 0000 by Jose Angel Christianson RN  Medication Review/Management: medications reviewed  Goal: Blood Pressure in Desired Range  Intervention: Maintain Blood Pressure Management  Recent Flowsheet Documentation  Taken 10/30/2024 0000 by Jose Angel Christianson RN  Medication Review/Management: medications reviewed     Problem: Skin Injury Risk Increased  Goal: Skin Health and Integrity  Intervention: Optimize Skin Protection  Recent Flowsheet Documentation  Taken 10/30/2024 0400 by Jose Angel Christianson RN  Pressure Reduction Techniques: frequent weight shift encouraged  Head of Bed (HOB) Positioning: \[facility]\"" elevated  Pressure Reduction Devices:   pressure-redistributing mattress utilized   heel offloading device utilized  Skin Protection:   incontinence pads utilized   silicone foam dressing in place  Taken 10/30/2024 0000 by Jose Angel Christianson RN  Pressure Reduction Techniques: frequent weight shift encouraged  Head of Bed (HOB) Positioning: HOB elevated  Pressure Reduction Devices: heel offloading device utilized  Skin Protection: incontinence pads utilized  Taken 10/29/2024 2000 by Jose Angel Christianson RN  Activity Management: activity encouraged  Pressure Reduction Techniques: frequent weight shift encouraged  Head of Bed (HOB) Positioning: \[facility]\"" elevated  Pressure Reduction Devices: heel offloading device utilized  Skin Protection: incontinence pads utilized   Goal Outcome Evaluation:   Patient was agitated and  was pulling on her Airvo and her IV. Patient was ordered benadryl. Patient calmed down after the administration of the benadryl. Patient has been resting comfortably between care.

## 2024-10-30 NOTE — PLAN OF CARE
Goal Outcome Evaluation:  Plan of Care Reviewed With: patient           Outcome Evaluation: Nadya Hurtado is a 83 y.o. female with a CMH of dementia, CHF, HTN, HLD, GERD, depression, DM who presented to [facility] on 10/28/2024 with shortness of breath.  Patient is a resident at [facility]. She developed acute onset of shortness of breath, requiring (new) oxygen supplementation (requiring AirVo).  CTA chest 10/28 results:   Dense consolidations within the bilateral lower lobes, Partial right middle lobe atelectasis, (-) for PE. CT abdomen 10/28 Impression: Lower lobe atelectasis involving both lungs. Pt with AMS and disorientation at baseline due to dementia.   Dx with Acute hypoxic hypercapnic respiratory failure, Acute CHF exacerbation,  Community-acquired pneumonia bilateral, UTI, Staph coagulase-negative bacteremia, and Bilateral lower lobe atelectasis. Pt resists most of the assessment today and perseverates on feeling hot. Frequent redirection needed.  PT contacted Dtr/POA who reports pt is rarely OOB and refuses participation in any rec or therapy activities at SNF.  Pt and POA have no therapy goals at this time. Based on this information, PT will sign off and complete order.    Anticipated Discharge Disposition (PT): extended care facility

## 2024-10-30 NOTE — CASE MANAGEMENT/SOCIAL WORK
Continued Stay Note  PRINCE Nguyễn     Patient Name: Nadya Hurtado  MRN: [id number]  Today's Date: 10/30/2024    Admit Date: 10/28/2024    Plan: D/C Plan: Return to [facility].  No new PASRR or Pre-cert required.   Discharge Plan       Row Name 10/30/24 1333       Plan    Plan D/C Plan: Return to [facility].  No new PASRR or Pre-cert required.               Expected Discharge Date and Time       Expected Discharge Date Expected Discharge Time    Nov 1, 2024               Kelsy Quintana RN  RN/.  Office Ph. [phone]  Cell Ph.  [phone]

## 2024-10-30 NOTE — PROGRESS NOTES
Daily Progress Note          Assessment    Shortness of breath  Hypoxia with compensated hypercapnia: ABG 7.42/56.5/47.3  Multifocal pneumonia due to unspecified pathogen  Chronic elevation of right hemidiaphragm  Right middle lobe atelectasis  Bacteremia with coag negative Staphylococcus  Stage I pressure ulcers present on admission      REBECCA on CPAP 8.5 cm H2O  Leukocytosis  Dementia  GERD  HTN  DM  Liver cirrhosis   mild chronic anemia  Echo 10/28/2024: LVEF 61-65%, LVH, grade 1 diastolic dysfunction, moderate AAS, moderate MS    Results for orders placed during the hospital encounter of 10/28/24    Adult Transthoracic Echo Complete W/ Cont if Necessary Per Protocol    Interpretation Summary    Left ventricular ejection fraction appears to be 61 - 65%.    Left ventricular wall thickness is consistent with mild to moderate concentric hypertrophy.    Left ventricular diastolic function is consistent with (grade Ia w/high LAP) impaired relaxation.    The left atrial cavity is mildly dilated.    Aortic valve was calcified with restricted leaflet mobility, moderate degree of aortic stenosis, maximum velocity across the valve 256 cm/s, maximum gradient 26 and mean gradient of 12 mmHg.    Moderate mitral valve stenosis is present, mean diastolic gradient of 7 mmHg.      Electronically signed by Heidi Borrego MD, 10/29/24, 7:26 PM EDT.            Recommendations:  Oxygen supplement and titration to maintain saturation 90 to 95%: Currently requiring Airvo, alternate with BiPAP at night  Bronchodilators  IV steroids  Repeat chest x-ray in a.m.    Antibiotics: Rocephin and vancomycin, repeated cultures are pending, echo with no signs of vegetation, ID consulted  Inhaled corticosteroids: Pulmicort  Mucinex       BP/glucose control  DVT/GI prophylaxis     I personally reviewed the radiological studies             LOS: 2 days     Subjective     Cough and shortness of breath    Objective     Vital signs for last 24  hours:  Vitals:    10/30/24 0809 10/30/24 0810 10/30/24 1116 10/30/24 1121   BP:       BP Location:       Patient Position:       Pulse: 77 77 79 80   Resp: 20 20 20 18   Temp:       TempSrc:       SpO2: 94% 94% 91% 93%   Weight:       Height:           Intake/Output last 3 shifts:  I/O last 3 completed shifts:  In: 1831 [I.V.:1831]  Out: 1150 [Urine:1150]  Intake/Output this shift:  No intake/output data recorded.      Radiology  Imaging Results (Last 24 Hours)       ** No results found for the last 24 hours. **            Labs:  Results from last 7 days   Lab Units 10/30/24  0519   WBC 10*3/mm3 12.64*   HEMOGLOBIN g/dL 10.3*   HEMATOCRIT % 31.9*   PLATELETS 10*3/mm3 302     Results from last 7 days   Lab Units 10/30/24  0831 10/29/24  0212   SODIUM mmol/L 141 136   POTASSIUM mmol/L 3.3* 4.0   CHLORIDE mmol/L 99 91*   CO2 mmol/L 33.2* 33.3*   BUN mg/dL 27* 19   CREATININE mg/dL 0.51* 0.57   CALCIUM mg/dL 9.2 9.6   BILIRUBIN mg/dL  --  0.4   ALK PHOS U/L  --  70   ALT (SGPT) U/L  --  <5   AST (SGOT) U/L  --  11   GLUCOSE mg/dL 143* 159*     Results from last 7 days   Lab Units 10/28/24  1741   PH, ARTERIAL pH units 7.424   PO2 ART mm Hg 47.3*   PCO2, ARTERIAL mm Hg 56.5*   HCO3 ART mmol/L 37.0*     Results from last 7 days   Lab Units 10/29/24  0212 10/28/24  1325   ALBUMIN g/dL 3.6 3.7     Results from last 7 days   Lab Units 10/28/24  1325   HSTROP T ng/L 17*                           Meds:   SCHEDULE  amLODIPine, 2.5 mg, Oral, Q24H  aspirin, 81 mg, Oral, Daily  budesonide, 0.5 mg, Nebulization, BID - RT  buPROPion, 75 mg, Oral, Daily  carvedilol, 12.5 mg, Oral, BID With Meals  cefTRIAXone, 2,000 mg, Intravenous, Q24H  [Held by provider] clonazePAM, 0.5 mg, Oral, Nightly  [Held by provider] dicyclomine, 10 mg, Oral, Q6H  enoxaparin, 40 mg, Subcutaneous, Q24H  furosemide, 20 mg, Intravenous, Q12H  [Held by provider] gabapentin, 100 mg, Oral, TID  guaiFENesin, 1,200 mg, Oral, Q12H  [Held by provider]  hydroCHLOROthiazide, 25 mg, Oral, Daily  insulin lispro, 2-7 Units, Subcutaneous, 4x Daily AC & at Bedtime  ipratropium-albuterol, 3 mL, Nebulization, Q4H - RT  levothyroxine, 25 mcg, Oral, Q AM  methylPREDNISolone sodium succinate, 40 mg, Intravenous, Q12H  miconazole, , Topical, BID  mirtazapine, 7.5 mg, Oral, Nightly  muscle rub, 1 Application, Topical, BID  pantoprazole, 40 mg, Oral, Q AM  potassium chloride, 10 mEq, Intravenous, Q1H  sodium chloride, 10 mL, Intravenous, Q12H  ursodiol, 500 mg, Oral, BID  vancomycin, 1,000 mg, Intravenous, Q12H  venlafaxine XR, 150 mg, Oral, Daily      Infusions  dextrose 5 % and sodium chloride 0.9 %, 125 mL/hr, Last Rate: 125 mL/hr (10/30/24 0522)  Pharmacy to dose vancomycin,       PRNs    acetaminophen    senna-docusate sodium **AND** polyethylene glycol **AND** bisacodyl **AND** bisacodyl    Calcium Replacement - Follow Nurse / BPA Driven Protocol    dextrose    dextrose    diphenoxylate-atropine    glucagon (human recombinant)    Magnesium Standard Dose Replacement - Follow Nurse / BPA Driven Protocol    nitroglycerin    Pharmacy to dose vancomycin    Phosphorus Replacement - Follow Nurse / BPA Driven Protocol    Potassium Replacement - Follow Nurse / BPA Driven Protocol    sodium chloride    sodium chloride    Physical Exam:  General Appearance: Lethargic looks chronically ill  HEENT:  Normocephalic, without obvious abnormality, Conjunctiva/corneas clear,.   Nares normal, no drainage     Neck:  Supple, symmetrical, trachea midline.   Lungs /Chest wall:   Bilateral basal rhonchi, respirations unlabored, symmetrical wall movement.     Heart:  Regular rate and rhythm, S1 S2 normal  Abdomen: Soft, non-tender, no masses, no organomegaly.    Extremities: No edema, no clubbing or cyanosis     ROS  Constitutional: Negative for chills, fever and malaise/fatigue.   HENT: Negative.    Eyes: Negative.    Cardiovascular: Negative.    Respiratory: Positive for cough and shortness of  breath.    Skin: Pressure ulcers on multiple locations  Musculoskeletal: Negative.    Gastrointestinal: Negative.    Genitourinary: Negative.    Neurological: Generalized weakness      I reviewed the recent clinical results  I personally reviewed the latest radiological studies    Part of this note may be an electronic transcription/translation of spoken language to printed text using the Dragon Dictation System.

## 2024-10-31 NOTE — PLAN OF CARE
Problem: Adult Inpatient Plan of Care  Goal: Absence of Hospital-Acquired Illness or Injury  Intervention: Identify and Manage Fall Risk  Recent Flowsheet Documentation  Taken 10/31/2024 1211 by Rebekah Nelson RN  Safety Promotion/Fall Prevention:   assistive device/personal items within reach   clutter free environment maintained   fall prevention program maintained   lighting adjusted   nonskid shoes/slippers when out of bed   room organization consistent   safety round/check completed  Taken 10/31/2024 1006 by Rebekah Nelson RN  Safety Promotion/Fall Prevention:   assistive device/personal items within reach   clutter free environment maintained   fall prevention program maintained   lighting adjusted   nonskid shoes/slippers when out of bed   room organization consistent   safety round/check completed  Taken 10/31/2024 0843 by Rebekah Nelson RN  Safety Promotion/Fall Prevention:   assistive device/personal items within reach   clutter free environment maintained   fall prevention program maintained   lighting adjusted   nonskid shoes/slippers when out of bed   room organization consistent   safety round/check completed  Intervention: Prevent Skin Injury  Recent Flowsheet Documentation  Taken 10/31/2024 1211 by Rebekah Nelson RN  Body Position: patient/family refused  Taken 10/31/2024 1006 by Rebekah Nelson RN  Body Position:   turned   right  Taken 10/31/2024 0843 by Rebekah Nelson RN  Body Position: patient/family refused  Intervention: Prevent Infection  Recent Flowsheet Documentation  Taken 10/31/2024 1211 by Rebekah Nelson RN  Infection Prevention:   hand hygiene promoted   personal protective equipment utilized  Taken 10/31/2024 1006 by Rebekah Nelson RN  Infection Prevention:   hand hygiene promoted   personal protective equipment utilized  Taken 10/31/2024 0843 by Rebekah Nelson RN  Infection Prevention:   hand hygiene promoted   personal protective equipment utilized   Goal Outcome Evaluation:      PT GIP  hospice. PRN comfort meds given Q1 hour throughout the shift, Pt unresponsive, only groaning at times. Family to remain at bedside. Plan of care to continue,

## 2024-10-31 NOTE — PROGRESS NOTES
Was contacted by patient's primary nurse that hospice nurse was at bedside and patient's daughter and patient want to transition to comfort care.  Patient is mildly confused but oriented to herself and her daughter.  Patient is already DNR/DNI.  The patient had expressed to her daughter that she wanted care removed  due to feeling like she was ready to go be with her .     The daughter states the patient keeps getting ill and will get some better but she is always weaker than her previous state once she is discharged.  Has to come back to the hospital within a short period of time decreasing her quality of life.  Would like to honor her mother's wishes and transition to comfort care.    Orders placed.    
Patient tolerated procedure well.

## 2024-10-31 NOTE — H&P
[facility] Medicine Services  History & Physical    Patient Name: Nadya Hurtado  : 1940  MRN: [id number]  Primary Care Physician:  Maximino Freedman MD  Date of admission: 10/31/2024  Date and Time of Service: 10/31/2024 at 8.36am    Subjective      Chief Complaint:   History was obtained patient's chart as patient was sedated  History of Present Illness: Nadya Hurtado is a 83 y.o. female with a CMH of  dementia, CHF, HTN, HLD, GERD who presented to [facility] on 10/28/2024 with shortness of breath.  Patient is a resident at [facility] patient developed acute onset of shortness of breath, requiring oxygen supplementation.  Patient does have some altered mental status/disorientation, however it is at patient's baseline.  Patient is typically on room.     While in the emergency department patient is requiring 5 L of oxygen.  Chest x-ray was negative, CT PE protocol pending, labs were remarkable for: Troponin 17, BNP 2654, sodium 134, chloride 92, CO2 33.2.  WBC 15.81, glucose.  Urine positive for nitrites, however 0-2 WBCs     While in the hospital she required Airvo and high oxygen concentration.  Patient's daughter wanted the patient to transition to comfort care patient has expressed to her daughter that she wanted care removed due to feeling like she was ready to go with her .  The nocturnal provider change patient's status to comfort care from DNR/DNI DNI.  As per daughter patient was getting ill and  returning home and getting weaker and weaker and coming back to the hospital.  And she would like to honor her mother's wishes and transition to comfort care.    Review of Systems  Unable to obtain due to patient's sedation  Personal History     Past Medical History:   Diagnosis Date    Arthritis     CHF (congestive heart failure)     Diabetes mellitus     Disease of thyroid gland     Esophageal spasm     Gastritis     GERD (gastroesophageal reflux disease)      Hyperlipidemia     Hypertension     IBS (irritable bowel syndrome)     Sleep apnea        Past Surgical History:   Procedure Laterality Date    BLADDER SUSPENSION      CARDIAC CATHETERIZATION      CHOLECYSTECTOMY      HYSTERECTOMY         Family History: family history is not on file. Otherwise pertinent FHx was reviewed and not pertinent to current issue.    Social History:  reports that she has never smoked. She has never been exposed to tobacco smoke. She has never used smokeless tobacco. She reports that she does not currently use alcohol. She reports that she does not use drugs.    Home Medications:  Prior to Admission Medications       Prescriptions Last Dose Informant Patient Reported? Taking?    acetaminophen (TYLENOL) 325 MG tablet   Yes No    Take 2 tablets by mouth Every 4 (Four) Hours As Needed for Mild Pain.    acetaminophen (TYLENOL) 325 MG tablet   Yes No    Take 2 tablets by mouth 2 (Two) Times a Day.    acetaminophen (TYLENOL) 650 MG suppository   Yes No    Insert 1 suppository into the rectum Every 4 (Four) Hours As Needed.    albuterol (PROVENTIL) (2.5 MG/3ML) 0.083% nebulizer solution   Yes No    Take 2.5 mg by nebulization Every 6 (Six) Hours As Needed for Shortness of Air.    amLODIPine (NORVASC) 10 MG tablet   Yes No    Take 1 tablet by mouth Daily.    aspirin 81 MG chewable tablet   No No    Chew 1 tablet Daily.    bisacodyl (DULCOLAX) 10 MG suppository   Yes No    Insert 1 suppository into the rectum As Needed for Constipation. If no results from milk of magnesia.   If no results in 8 hours, give enema.    buPROPion (WELLBUTRIN) 75 MG tablet   Yes No    Take 1 tablet by mouth Daily.    carvedilol (COREG) 12.5 MG tablet   Yes No    Take 1 tablet by mouth 2 (Two) Times a Day With Meals.    clonazePAM (KlonoPIN) 0.5 MG tablet   Yes No    Take 1 tablet by mouth Every Night.    dextrose (GLUTOSE) 40 % gel   Yes No    Take 15 g by mouth Every 15 (Fifteen) Minutes As Needed for Low Blood Sugar.  Blood glucose < 60    dicyclomine (BENTYL) 10 MG capsule   Yes No    Take 1 capsule by mouth Every 6 (Six) Hours.    diphenoxylate-atropine (LOMOTIL) 2.5-0.025 MG per tablet   Yes No    Take 1 tablet by mouth Every 6 (Six) Hours As Needed for Diarrhea.    dry mouth gel (BIOTENE ORALBALANCE) gel   Yes No    Apply  to the mouth or throat Every 6 (Six) Hours As Needed for Dry Mouth. Give 15 cc by mouth every 6 hours as needed for dry mouth rinse 30 seconds then spit out    gabapentin (NEURONTIN) 100 MG capsule   Yes No    Take 1 capsule by mouth 3 (Three) Times a Day.    Glucagon, rDNA, (Glucagon Emergency) 1 MG kit   Yes No    Inject 1 mg as directed As Needed (Blood glucose <60).    guaiFENesin-dextromethorphan (ROBITUSSIN DM) 100-10 MG/5ML syrup   No No    Take 5 mL by mouth Every 4 (Four) Hours As Needed for Cough.    hydroCHLOROthiazide (HYDRODIURIL) 25 MG tablet   Yes No    Take 1 tablet by mouth Daily.    icosapent ethyl (VASCEPA) 1 g capsule capsule   Yes No    Take 1 g by mouth 2 (Two) Times a Day With Meals.    levothyroxine (SYNTHROID, LEVOTHROID) 25 MCG tablet   Yes No    Take 1 tablet by mouth Every Morning.    magnesium hydroxide (MILK OF MAGNESIA) 400 MG/5ML suspension   Yes No    Take 30 mL by mouth As Needed for Constipation. If no results in 8 hours, give bisacodyl suppository    Menthol, Topical Analgesic, 4 % gel   Yes No    Apply  topically 2 (Two) Times a Day. Bilateral shoulders, back neck for mild pain    menthol-zinc oxide (CALMOSEPTINE) 0.44-20.625 % ointment ointment   Yes No    Apply 1 Application topically to the appropriate area as directed Every 12 (Twelve) Hours. Bilateral buttocks topically every shift for tx    metFORMIN (GLUCOPHAGE) 500 MG tablet   Yes No    Take 1 tablet by mouth 2 (Two) Times a Day With Meals.    miconazole (MICOTIN) 2 % powder   Yes No    Apply  topically to the appropriate area as directed 2 (Two) Times a Day.    mineral oil-hydrophilic petrolatum (AQUAPHOR)  "ointment   Yes No    Apply 1 Application topically to the appropriate area as directed 2 (Two) Times a Day. Bilateral lower extremities    mirtazapine (REMERON) 7.5 MG tablet   Yes No    Take 1 tablet by mouth Every Night.    nitrofurantoin, macrocrystal-monohydrate, (MACROBID) 100 MG capsule   Yes No    Take 1 capsule by mouth every night at bedtime.    nitroglycerin (NITROSTAT) 0.4 MG SL tablet   Yes No    Place 1 tablet under the tongue Every 5 (Five) Minutes As Needed for Chest Pain. Take no more than 3 doses in 15 minutes.    nystatin (MYCOSTATIN) 229826 UNIT/GM ointment   Yes No    Apply 1 Application topically to the appropriate area as directed Every 12 (Twelve) Hours As Needed.    omeprazole (priLOSEC) 20 MG capsule   Yes No    Take 1 capsule by mouth Daily.    ondansetron (ZOFRAN) 4 MG tablet   Yes No    Take 1 tablet by mouth Every 6 (Six) Hours As Needed for Nausea or Vomiting.    Sodium Phosphates (fleet enema) 7-19 GM/118ML enema   Yes No    Insert 1 enema into the rectum As Needed. If no results from suppository    ursodiol (ACTIGALL) 500 MG tablet   Yes No    Take 1 tablet by mouth 2 (Two) Times a Day.    venlafaxine XR (EFFEXOR-XR) 150 MG 24 hr capsule   Yes No    Take 1 capsule by mouth Daily.              Allergies:  Allergies   Allergen Reactions    Dilaudid [Hydromorphone] Anaphylaxis    Hydromorphone Hcl Anaphylaxis     \"coded on it\" Stopped breathing    Ciprofloxacin Unknown - Low Severity     \"I became very sick\"    Penicillins Rash    Sulfa Antibiotics Rash       Objective      Vitals:   Temp:  [97.7 °F (36.5 °C)-98.8 °F (37.1 °C)] 98.8 °F (37.1 °C)  Heart Rate:  [79-96] 79  Resp:  [16-26] 26  BP: (115-159)/(73-75) 115/73  There is no height or weight on file to calculate BMI.  Physical Exam  HEENT patient was sedated  Heart S1-S2 soft  Lungs decreased air entry  Abdomen scaphoid no visceromegaly no tenderness  Extremities no cyanosis clubbing 1+ edema  Diagnostic Data:  Lab Results (last 24 " hours)       ** No results found for the last 24 hours. **             Imaging Results (Last 24 Hours)       ** No results found for the last 24 hours. **              Assessment & Plan        This is a 83 y.o. female with: Dementia and multiple medical problems made comfort care by her daughter as her as per patient's wishes.  Continue morphine and Ativan and Robinul all as needed.  Further management as per hospice.    Active and Resolved Problems  Active Hospital Problems    Diagnosis  POA    **End of life care [Z51.5]  Not Applicable      Resolved Hospital Problems   No resolved problems to display.           VTE Prophylaxis:  Mechanical VTE prophylaxis orders are present.        The patient desires to be as follows:    CODE STATUS:    Level Of Support Discussed With: Next of Kin (If No Surrogate)  Code Status (Patient has no pulse and is not breathing): No CPR (Do Not Attempt to Resuscitate)  Medical Interventions (Patient has pulse or is breathing): Comfort Measures        Patient's daughter, who can be contacted at , is the designated person to make medical decisions on the patient's behalf if  is incapable of doing so. This was clarified with patient and/or next of kin on 10/31/2024 during the course of this H&P.    Admission Status:  I believe this patient meets inpatient hospice status.    Expected Length of Stay: unknown    PDMP and Medication Dispenses via Sidebar reviewed and consistent with patient reported medications.    I discussed the patient's findings and my recommendations with patient and family.  Family      Signature:   Tess hathaway  This document has been electronically signed by Tess Hathaway MD on October 31, 2024 14:28 EDT   [facility]

## 2024-10-31 NOTE — PLAN OF CARE
Problem: Adult Inpatient Plan of Care  Goal: Absence of Hospital-Acquired Illness or Injury  Intervention: Identify and Manage Fall Risk  Recent Flowsheet Documentation  Taken 10/31/2024 0400 by Jose Angel Christianson RN  Safety Promotion/Fall Prevention:   safety round/check completed   room organization consistent   nonskid shoes/slippers when out of bed   fall prevention program maintained   clutter free environment maintained   assistive device/personal items within reach  Taken 10/31/2024 0200 by Jose Angel Christianson RN  Safety Promotion/Fall Prevention:   safety round/check completed   room organization consistent   nonskid shoes/slippers when out of bed   fall prevention program maintained   clutter free environment maintained   assistive device/personal items within reach  Intervention: Prevent Skin Injury  Recent Flowsheet Documentation  Taken 10/31/2024 0400 by Jose Angel Christianson RN  Body Position: position changed independently  Intervention: Prevent Infection  Recent Flowsheet Documentation  Taken 10/31/2024 0400 by Jose Angel Christianson RN  Infection Prevention:   environmental surveillance performed   hand hygiene promoted   personal protective equipment utilized   single patient room provided   rest/sleep promoted   Goal Outcome Evaluation:      Patient was placed on hospice care yesterday. Patient's Airvo was taken off at 2100. Family has refused a nasal cannula. Patient is receiving morphine, ativan, and robinul as needed. Patient screened positive for severe sepsis the on call nurse practitioner was notified. I was unable to co back in and score the patient's pain score after the administration of morphine. Patient has been calm and cooperative with care. Patient has been resting comfortably between care.

## 2024-10-31 NOTE — NURSING NOTE
"  Pt passed at 1754. Family notified. Pt to go to [facility] [place] [facility]. [place], [place]. Awaiting transport. This RN spoke to \"Barron\"      "

## 2024-10-31 NOTE — CASE MANAGEMENT/SOCIAL WORK
Case Management Discharge Note      Final Note: GIP under [facility]         Selected Continued Care - Discharged on 10/31/2024 Admission date: 10/28/2024 - Discharge disposition: Hospice/Medical Facility ([facility])      Destination Coordination complete.      Service Provider Services Address Phone Fax Patient Preferred    [facility] [address] [place] [address] [phone] --                    Final Discharge Disposition Code: 51 - hospice medical facility

## 2024-10-31 NOTE — CASE MANAGEMENT/SOCIAL WORK
Continued Stay Note  PRINCE Nguyễn     Patient Name: Nadya Hurtado  MRN: [id number]  Today's Date: 10/31/2024    Admit Date: 10/31/2024    Plan: D/C Plan: [facility]   Discharge Plan       Row Name 10/31/24 1325       Plan    Plan D/C Plan: [facility]                     Kelsy Quintana RN  RN/.  Office Ph. [phone]  Cell Ph.  [phone]

## 2024-11-01 LAB
BACTERIA SPEC AEROBE CULT: ABNORMAL
GRAM STN SPEC: ABNORMAL
ISOLATED FROM: ABNORMAL

## 2024-11-01 NOTE — CASE MANAGEMENT/SOCIAL WORK
Case Management Discharge Note      Final Note:                      Selected Continued Care - Prior Encounters Includes continued care and service providers with selected services from prior encounters from 2024 to 10/31/2024      Discharged on 10/31/2024 Admission date: 10/28/2024 - Discharge disposition: Hospice/Medical Facility ([facility])      Destination       Service Provider Services Address Phone Fax Patient Preferred    [facility] [address] [place] [address] [place] [address] [phone] --                            Final Discharge Disposition Code: 20 -

## 2024-11-03 LAB — BACTERIA SPEC AEROBE CULT: NORMAL

## 2024-11-06 NOTE — DISCHARGE SUMMARY
"[facility] Medicine Services  Discharge Summary    Date of Service: 10/31/2024  Patient Name: Nadya Hurtado  : 1940  MRN: [id number]    Date of Admission: 10/28/2024  Discharge Diagnosis: Shortness of breath  Date of Discharge: 10/31/2024  Primary Care Physician: Maximino Freedman MD    Presenting Problem:   Shortness of breath [R06.02]  Hypoxia [R09.02]  Dyspnea, unspecified type [R06.00]  Pneumonia due to infectious organism, unspecified laterality, unspecified part of lung [J18.9]  Congestive heart failure, unspecified HF chronicity, unspecified heart failure type [I50.9]  End of life care [Z51.5]    Active and Resolved Hospital Problems:  Active Hospital Problems    Diagnosis POA    **Shortness of breath [R06.02] Yes    End of life care [Z51.5] Not Applicable      Resolved Hospital Problems   No resolved problems to display.         Hospital Course     HPI:  \"Nadya Hurtado is a 83 y.o. female with a CMH of  dementia, CHF, HTN, HLD, GERD who presented to [facility] on 10/28/2024 with shortness of breath.  Patient is a resident at [facility] patient developed acute onset of shortness of breath, requiring oxygen supplementation.  Patient does have some altered mental status/disorientation, however it is at patient's baseline.  Patient is typically on room.     While in the emergency department patient is requiring 5 L of oxygen.  Chest x-ray was negative, CT PE protocol pending, labs were remarkable for: Troponin 17, BNP 2654, sodium 134, chloride 92, CO2 33.2.  WBC 15.81, glucose.  Urine positive for nitrites, however 0-2 WBCs     While in the hospital she required Airvo and high oxygen concentration.  Patient's daughter wanted the patient to transition to comfort care patient has expressed to her daughter that she wanted care removed due to feeling like she was ready to go with her .  The nocturnal provider change patient's status to comfort care from DNR/DNI " "DNI.  As per daughter patient was getting ill and  returning home and getting weaker and weaker and coming back to the hospital.  And she would like to honor her mother's wishes and transition to comfort care.\"    Hospital Course:  This is a 83 y.o. female with: Dementia and multiple medical problems made comfort care by her daughter as her as per patient's wishes.  Continued morphine and Ativan and Robinul all as needed.  Further management as per hospice MD ordered.  Pt passed away at 1754.    DISCHARGE Follow Up Recommendations for labs and diagnostics:       Reasons For Change In Medications and Indications for New Medications:      Day of Discharge     Vital Signs:       Physical Exam:      Pertinent  and/or Most Recent Results     LAB RESULTS:      Lab 10/30/24  0519   WBC 12.64*   HEMOGLOBIN 10.3*   HEMATOCRIT 31.9*   PLATELETS 302   NEUTROS ABS 11.05*   IMMATURE GRANS (ABS) 0.06*   LYMPHS ABS 1.06   MONOS ABS 0.45   EOS ABS 0.00   MCV 86.2         Lab 10/30/24  0831   SODIUM 141   POTASSIUM 3.3*   CHLORIDE 99   CO2 33.2*   ANION GAP 8.8   BUN 27*   CREATININE 0.51*   EGFR 92.2   GLUCOSE 143*   CALCIUM 9.2                         Brief Urine Lab Results  (Last result in the past 365 days)        Color   Clarity   Blood   Leuk Est   Nitrite   Protein   CREAT   Urine HCG        10/28/24 1512 Dark Yellow   Cloudy   Negative   Small (1+)   Positive   Trace                 Microbiology Results (last 10 days)       Procedure Component Value - Date/Time    Legionella Antigen, Urine - Urine, Urine, Clean Catch [[id number]]  (Normal) Collected: 10/30/24 1139    Lab Status: Final result Specimen: Urine, Clean Catch Updated: 10/30/24 1221     LEGIONELLA ANTIGEN, URINE Negative    S. Pneumo Ag Urine or CSF - Urine, Urine, Clean Catch [[id number]]  (Normal) Collected: 10/30/24 1139    Lab Status: Final result Specimen: Urine, Clean Catch Updated: 10/30/24 1222     Strep Pneumo Ag Negative    MRSA Screen, PCR " (Inpatient) - Swab, Nares [[id number]]  (Normal) Collected: 10/30/24 1139    Lab Status: Final result Specimen: Swab from Nares Updated: 10/30/24 1313     MRSA PCR No MRSA Detected    Narrative:      The negative predictive value of this diagnostic test is high and should only be used to consider de-escalating anti-MRSA therapy. A positive result may indicate colonization with MRSA and must be correlated clinically.    Blood Culture - Blood, Arm, Right [[id number]]  (Normal) Collected: 10/29/24 1800    Lab Status: Final result Specimen: Blood from Arm, Right Updated: 11/03/24 1732     Blood Culture No growth at 5 days    Blood Culture - Blood, Arm, Left [[id number]]  (Abnormal) Collected: 10/29/24 1800    Lab Status: Final result Specimen: Blood from Arm, Left Updated: 11/01/24 0639     Blood Culture Staphylococcus capitis     Isolated from Anaerobic Bottle     Gram Stain Anaerobic Bottle Gram positive cocci in clusters    Narrative:      Refer to previous blood culture collected on 10/28/2024 1353 for MICs.    Blood Culture - Blood, Arm, Left [[id number]]  (Abnormal)  (Susceptibility) Collected: 10/28/24 1410    Lab Status: Final result Specimen: Blood from Arm, Left Updated: 10/31/24 0638     Blood Culture Staphylococcus capitis     Isolated from Aerobic and Anaerobic Bottles     Gram Stain Aerobic Bottle Gram positive cocci in clusters      Anaerobic Bottle Gram positive cocci in clusters    Narrative:      Strain 2  Probable contaminant requires clinical correlation, susceptibility not performed unless requested by physician.      Susceptibility        Staphylococcus capitis      ADALBERTO      Oxacillin Susceptible      Vancomycin Susceptible                           Blood Culture - Blood, Arm, Right [[id number]]  (Abnormal)  (Susceptibility) Collected: 10/28/24 1353    Lab Status: Final result Specimen: Blood from Arm, Right Updated: 10/31/24 0638     Blood Culture Staphylococcus capitis     Isolated from Aerobic and  Anaerobic Bottles     Gram Stain Aerobic Bottle Gram positive cocci in clusters      Anaerobic Bottle Gram positive cocci in clusters    Narrative:      Strain 1  Probable contaminant requires clinical correlation, susceptibility not performed unless requested by physician.      Susceptibility        Staphylococcus capitis      ADALBERTO      Oxacillin Resistant      Vancomycin Susceptible                           Blood Culture ID, PCR - Blood, Arm, Right [[id number]]  (Abnormal) Collected: 10/28/24 1353    Lab Status: Final result Specimen: Blood from Arm, Right Updated: 10/29/24 1420     BCID, PCR Staph spp, not aureus or lugdunensis. Identification by BCID2 PCR.     BOTTLE TYPE Anaerobic Bottle    Respiratory Panel PCR w/COVID-19(SARS-CoV-2) CHEIKH/TOMAS/DARÍO/PAD/COR/DANAE In-House, NP Swab in UTM/VTM, 2 HR TAT - Swab, Nasopharynx [[id number]]  (Normal) Collected: 10/28/24 1331    Lab Status: Final result Specimen: Swab from Nasopharynx Updated: 10/28/24 1451     ADENOVIRUS, PCR Not Detected     Coronavirus 229E Not Detected     Coronavirus HKU1 Not Detected     Coronavirus NL63 Not Detected     Coronavirus OC43 Not Detected     COVID19 Not Detected     Human Metapneumovirus Not Detected     Human Rhinovirus/Enterovirus Not Detected     Influenza A PCR Not Detected     Influenza B PCR Not Detected     Parainfluenza Virus 1 Not Detected     Parainfluenza Virus 2 Not Detected     Parainfluenza Virus 3 Not Detected     Parainfluenza Virus 4 Not Detected     RSV, PCR Not Detected     Bordetella pertussis pcr Not Detected     Bordetella parapertussis PCR Not Detected     Chlamydophila pneumoniae PCR Not Detected     Mycoplasma pneumo by PCR Not Detected    Narrative:      In the setting of a positive respiratory panel with a viral infection PLUS a negative procalcitonin without other underlying concern for bacterial infection, consider observing off antibiotics or discontinuation of antibiotics and continue supportive care. If the  respiratory panel is positive for atypical bacterial infection (Bordetella pertussis, Chlamydophila pneumoniae, or Mycoplasma pneumoniae), consider antibiotic de-escalation to target atypical bacterial infection.            CT Abdomen Pelvis Without Contrast    Result Date: 10/28/2024  Impression: Lower lobe atelectasis involving both lungs. Electronically Signed: Tonny Chen MD  10/28/2024 6:22 PM EDT  Workstation ID: [id number]    CT Angiogram Chest Pulmonary Embolism    Result Date: 10/28/2024  Impression: Impression: 1. No pulmonary embolism. 2. Dense consolidations within the bilateral lower lobes. Correlate for pneumonia or sequela of aspiration. 3. Partial right middle lobe atelectasis may be related to elevation the right hemidiaphragm. 4. Benign calcified granulomatous changes in the chest. 5. Cirrhotic liver morphology. Correlate with known history. 6. Additional chronic findings as described above. Electronically Signed: Julia Sanders MD  10/28/2024 5:06 PM EDT  Workstation ID: [id number]    XR Chest 1 View    Result Date: 10/28/2024  Impression: Impression: 1. No acute chest findings. 2. Appears stable moderate right hemidiaphragm elevation with passive right lung atelectasis. Electronically Signed: Julia Sanders MD  10/28/2024 2:28 PM EDT  Workstation ID: [id number]             Results for orders placed during the hospital encounter of 10/28/24    Adult Transthoracic Echo Complete W/ Cont if Necessary Per Protocol    Interpretation Summary    Left ventricular ejection fraction appears to be 61 - 65%.    Left ventricular wall thickness is consistent with mild to moderate concentric hypertrophy.    Left ventricular diastolic function is consistent with (grade Ia w/high LAP) impaired relaxation.    The left atrial cavity is mildly dilated.    Aortic valve was calcified with restricted leaflet mobility, moderate degree of aortic stenosis, maximum velocity across the valve 256 cm/s, maximum gradient 26 and mean  gradient of 12 mmHg.    Moderate mitral valve stenosis is present, mean diastolic gradient of 7 mmHg.      Electronically signed by Heidi Borrego MD, 10/29/24, 7:26 PM EDT.      Labs Pending at Discharge:  Pending Results       None            Procedures Performed         Consults:   Consults       Date and Time Order Name Status Description    10/30/2024  8:29 AM Inpatient Infectious Diseases Consult Completed     10/28/2024  5:47 PM Inpatient Pulmonology Consult Completed             Discharge Details        Discharge Medications        Continue These Medications        Instructions Start Date   acetaminophen 325 MG tablet  Commonly known as: TYLENOL   650 mg, Oral, Every 4 Hours PRN      acetaminophen 650 MG suppository  Commonly known as: TYLENOL   650 mg, Rectal, Every 4 Hours PRN      acetaminophen 325 MG tablet  Commonly known as: TYLENOL   650 mg, 2 Times Daily      albuterol (2.5 MG/3ML) 0.083% nebulizer solution  Commonly known as: PROVENTIL   2.5 mg, Nebulization, Every 6 Hours PRN      amLODIPine 10 MG tablet  Commonly known as: NORVASC   10 mg, Daily      aspirin 81 MG chewable tablet   81 mg, Oral, Daily      bisacodyl 10 MG suppository  Commonly known as: DULCOLAX   10 mg, Rectal, As Needed, If no results from milk of magnesia.   If no results in 8 hours, give enema.      buPROPion 75 MG tablet  Commonly known as: WELLBUTRIN   75 mg, Daily      carvedilol 12.5 MG tablet  Commonly known as: COREG   12.5 mg, 2 Times Daily With Meals      clonazePAM 0.5 MG tablet  Commonly known as: KlonoPIN   0.5 mg, Nightly      dextrose 40 % gel  Commonly known as: GLUTOSE   15 g, Oral, Every 15 Minutes PRN, Blood glucose < 60      dicyclomine 10 MG capsule  Commonly known as: BENTYL   10 mg, Oral, Every 6 Hours      diphenoxylate-atropine 2.5-0.025 MG per tablet  Commonly known as: LOMOTIL   1 tablet, Oral, Every 6 Hours PRN      dry mouth gel gel   Mouth/Throat, Every 6 Hours PRN, Give 15 cc by mouth every 6  hours as needed for dry mouth rinse 30 seconds then spit out       fleet enema 7-19 GM/118ML enema   1 enema, Rectal, As Needed, If no results from suppository      gabapentin 100 MG capsule  Commonly known as: NEURONTIN   100 mg, 3 Times Daily      glucagon 1 MG injection  Commonly known as: GLUCAGEN   1 mg, Injection, As Needed      guaiFENesin-dextromethorphan 100-10 MG/5ML syrup  Commonly known as: ROBITUSSIN DM   5 mL, Oral, Every 4 Hours PRN      hydroCHLOROthiazide 25 MG tablet   25 mg, Daily      icosapent ethyl 1 g capsule capsule  Commonly known as: VASCEPA   1 g, 2 Times Daily With Meals      levothyroxine 25 MCG tablet  Commonly known as: SYNTHROID, LEVOTHROID   25 mcg, Every Early Morning      magnesium hydroxide 400 MG/5ML suspension  Commonly known as: MILK OF MAGNESIA   30 mL, Oral, As Needed, If no results in 8 hours, give bisacodyl suppository      Menthol (Topical Analgesic) 4 % gel   2 Times Daily      menthol-zinc oxide 0.44-20.625 % ointment ointment  Commonly known as: CALMOSEPTINE   1 Application, Every 12 Hours Scheduled      metFORMIN 500 MG tablet  Commonly known as: GLUCOPHAGE   500 mg, 2 Times Daily With Meals      miconazole 2 % powder  Commonly known as: MICOTIN   2 Times Daily      mineral oil-hydrophilic petrolatum ointment   2 Times Daily      mirtazapine 7.5 MG tablet  Commonly known as: REMERON   7.5 mg, Nightly      nitrofurantoin (macrocrystal-monohydrate) 100 MG capsule  Commonly known as: MACROBID   100 mg, Every Night at Bedtime      nitroglycerin 0.4 MG SL tablet  Commonly known as: NITROSTAT   0.4 mg, Sublingual, Every 5 Minutes PRN, Take no more than 3 doses in 15 minutes.      nystatin 229342 UNIT/GM ointment  Commonly known as: MYCOSTATIN   1 Application, Every 12 Hours PRN      omeprazole 20 MG capsule  Commonly known as: priLOSEC   20 mg, Daily      ondansetron 4 MG tablet  Commonly known as: ZOFRAN   4 mg, Oral, Every 6 Hours PRN      ursodiol 500 MG tablet  Commonly  "known as: ACTIGALL   500 mg, 2 Times Daily      venlafaxine  MG 24 hr capsule  Commonly known as: EFFEXOR-XR   150 mg, Daily               Allergies   Allergen Reactions    Dilaudid [Hydromorphone] Anaphylaxis    Hydromorphone Hcl Anaphylaxis     \"coded on it\" Stopped breathing    Ciprofloxacin Unknown - Low Severity     \"I became very sick\"    Penicillins Rash    Sulfa Antibiotics Rash       Discharge Disposition:       Diet:      Discharge Activity:       CODE STATUS:  Code Status and Medical Interventions: No CPR (Do Not Attempt to Resuscitate); Comfort Measures   Ordered at: 10/30/24 2109     Code Status (Patient has no pulse and is not breathing):    No CPR (Do Not Attempt to Resuscitate)     Medical Interventions (Patient has pulse or is breathing):    Comfort Measures       No future appointments.        Time spent on Discharge including face to face service:  >30 minutes    Signature: Electronically signed by Tess Hathaway MD, 24, 22:43 EST.  [place] [facility]ist Team   "